# Patient Record
Sex: FEMALE | Race: WHITE | Employment: PART TIME | URBAN - METROPOLITAN AREA
[De-identification: names, ages, dates, MRNs, and addresses within clinical notes are randomized per-mention and may not be internally consistent; named-entity substitution may affect disease eponyms.]

---

## 2017-02-05 ENCOUNTER — HOSPITAL ENCOUNTER (INPATIENT)
Age: 67
LOS: 1 days | Discharge: HOME | DRG: 309 | End: 2017-02-05
Attending: EMERGENCY MEDICINE | Admitting: INTERNAL MEDICINE
Payer: COMMERCIAL

## 2017-02-05 VITALS
BODY MASS INDEX: 30.02 KG/M2 | SYSTOLIC BLOOD PRESSURE: 127 MMHG | HEART RATE: 80 BPM | DIASTOLIC BLOOD PRESSURE: 64 MMHG | OXYGEN SATURATION: 97 % | TEMPERATURE: 97.7 F | HEIGHT: 67 IN | WEIGHT: 191.3 LBS | RESPIRATION RATE: 18 BRPM

## 2017-02-05 DIAGNOSIS — R00.0 WIDE-COMPLEX TACHYCARDIA: Primary | ICD-10-CM

## 2017-02-05 DIAGNOSIS — E87.6 HYPOKALEMIA: ICD-10-CM

## 2017-02-05 DIAGNOSIS — I44.7 LEFT BUNDLE BRANCH BLOCK: ICD-10-CM

## 2017-02-05 DIAGNOSIS — D72.829 LEUKOCYTOSIS, UNSPECIFIED TYPE: ICD-10-CM

## 2017-02-05 PROBLEM — I24.89 DEMAND ISCHEMIA: Status: ACTIVE | Noted: 2017-02-05

## 2017-02-05 PROBLEM — I24.8 DEMAND ISCHEMIA (HCC): Status: ACTIVE | Noted: 2017-02-05

## 2017-02-05 PROBLEM — I10 HTN (HYPERTENSION): Status: ACTIVE | Noted: 2017-02-05

## 2017-02-05 LAB
-: NORMAL
ABSOLUTE EOS #: 0.77 K/UL (ref 0–0.4)
ABSOLUTE LYMPH #: 2.55 K/UL (ref 1–4.8)
ABSOLUTE MONO #: 2.3 K/UL (ref 0.1–0.8)
AMORPHOUS: NORMAL
ANION GAP SERPL CALCULATED.3IONS-SCNC: 16 MMOL/L (ref 9–17)
BACTERIA: NORMAL
BASOPHILS # BLD: 0 % (ref 0–2)
BASOPHILS ABSOLUTE: 0 K/UL (ref 0–0.2)
BILIRUBIN URINE: NEGATIVE
BUN BLDV-MCNC: 37 MG/DL (ref 8–23)
BUN/CREAT BLD: ABNORMAL (ref 9–20)
CALCIUM SERPL-MCNC: 7.7 MG/DL (ref 8.6–10.4)
CASTS UA: NORMAL /LPF (ref 0–8)
CHLORIDE BLD-SCNC: 97 MMOL/L (ref 98–107)
CHOLESTEROL/HDL RATIO: 4.3
CHOLESTEROL: 156 MG/DL
CO2: 20 MMOL/L (ref 20–31)
COLOR: YELLOW
CREAT SERPL-MCNC: 0.92 MG/DL (ref 0.5–0.9)
CRYSTALS, UA: NORMAL /HPF
DIFFERENTIAL TYPE: ABNORMAL
EOSINOPHILS RELATIVE PERCENT: 3 % (ref 1–4)
EPITHELIAL CELLS UA: NORMAL /HPF (ref 0–5)
GFR AFRICAN AMERICAN: >60 ML/MIN
GFR NON-AFRICAN AMERICAN: >60 ML/MIN
GFR SERPL CREATININE-BSD FRML MDRD: ABNORMAL ML/MIN/{1.73_M2}
GFR SERPL CREATININE-BSD FRML MDRD: ABNORMAL ML/MIN/{1.73_M2}
GLUCOSE BLD-MCNC: 137 MG/DL (ref 70–99)
GLUCOSE URINE: NEGATIVE
HCT VFR BLD CALC: 39.3 % (ref 36–46)
HDLC SERPL-MCNC: 36 MG/DL
HEMOGLOBIN: 13.1 G/DL (ref 12–16)
KETONES, URINE: NEGATIVE
LDL CHOLESTEROL: 56 MG/DL (ref 0–130)
LEUKOCYTE ESTERASE, URINE: NEGATIVE
LYMPHOCYTES # BLD: 10 % (ref 24–44)
MAGNESIUM: 2.3 MG/DL (ref 1.6–2.6)
MAGNESIUM: 2.3 MG/DL (ref 1.6–2.6)
MCH RBC QN AUTO: 30.5 PG (ref 26–34)
MCHC RBC AUTO-ENTMCNC: 33.4 G/DL (ref 31–37)
MCV RBC AUTO: 91.4 FL (ref 80–100)
METAMYELOCYTES ABSOLUTE COUNT: 0.77 K/UL
METAMYELOCYTES: 3 %
MONOCYTES # BLD: 9 % (ref 1–7)
MORPHOLOGY: NORMAL
MUCUS: NORMAL
NITRITE, URINE: NEGATIVE
OTHER OBSERVATIONS UA: NORMAL
PDW BLD-RTO: 14.1 % (ref 12.5–15.4)
PH UA: 6 (ref 5–8)
PLATELET # BLD: 356 K/UL (ref 140–450)
PLATELET ESTIMATE: ABNORMAL
PMV BLD AUTO: 7.5 FL (ref 6–12)
POC TROPONIN I: 0.11 NG/ML (ref 0–0.1)
POC TROPONIN INTERP: ABNORMAL
POTASSIUM SERPL-SCNC: 3.7 MMOL/L (ref 3.7–5.3)
PROTEIN UA: NEGATIVE
RBC # BLD: 4.3 M/UL (ref 4–5.2)
RBC # BLD: ABNORMAL 10*6/UL
RBC UA: NORMAL /HPF (ref 0–4)
RENAL EPITHELIAL, UA: NORMAL /HPF
SEG NEUTROPHILS: 75 % (ref 36–66)
SEGMENTED NEUTROPHILS ABSOLUTE COUNT: 19.11 K/UL (ref 1.8–7.7)
SODIUM BLD-SCNC: 133 MMOL/L (ref 135–144)
SPECIFIC GRAVITY UA: 1.02 (ref 1–1.03)
TRICHOMONAS: NORMAL
TRIGL SERPL-MCNC: 322 MG/DL
TROPONIN INTERP: NORMAL
TROPONIN T: <0.03 NG/ML
TURBIDITY: CLEAR
URINE HGB: NEGATIVE
UROBILINOGEN, URINE: NORMAL
VLDLC SERPL CALC-MCNC: ABNORMAL MG/DL (ref 1–30)
WBC # BLD: 25.5 K/UL (ref 3.5–11)
WBC # BLD: ABNORMAL 10*3/UL
WBC UA: NORMAL /HPF (ref 0–5)
YEAST: NORMAL

## 2017-02-05 PROCEDURE — 83735 ASSAY OF MAGNESIUM: CPT

## 2017-02-05 PROCEDURE — 6370000000 HC RX 637 (ALT 250 FOR IP): Performed by: INTERNAL MEDICINE

## 2017-02-05 PROCEDURE — 80048 BASIC METABOLIC PNL TOTAL CA: CPT

## 2017-02-05 PROCEDURE — 81001 URINALYSIS AUTO W/SCOPE: CPT

## 2017-02-05 PROCEDURE — 99285 EMERGENCY DEPT VISIT HI MDM: CPT

## 2017-02-05 PROCEDURE — 99223 1ST HOSP IP/OBS HIGH 75: CPT | Performed by: INTERNAL MEDICINE

## 2017-02-05 PROCEDURE — 2580000003 HC RX 258: Performed by: EMERGENCY MEDICINE

## 2017-02-05 PROCEDURE — 83036 HEMOGLOBIN GLYCOSYLATED A1C: CPT

## 2017-02-05 PROCEDURE — 36415 COLL VENOUS BLD VENIPUNCTURE: CPT

## 2017-02-05 PROCEDURE — 84484 ASSAY OF TROPONIN QUANT: CPT

## 2017-02-05 PROCEDURE — 97116 GAIT TRAINING THERAPY: CPT

## 2017-02-05 PROCEDURE — 85025 COMPLETE CBC W/AUTO DIFF WBC: CPT

## 2017-02-05 PROCEDURE — 2140000000 HC CCU INTERMEDIATE R&B

## 2017-02-05 PROCEDURE — 96374 THER/PROPH/DIAG INJ IV PUSH: CPT

## 2017-02-05 PROCEDURE — 6370000000 HC RX 637 (ALT 250 FOR IP): Performed by: HOSPITALIST

## 2017-02-05 PROCEDURE — 93005 ELECTROCARDIOGRAM TRACING: CPT

## 2017-02-05 PROCEDURE — 97161 PT EVAL LOW COMPLEX 20 MIN: CPT

## 2017-02-05 PROCEDURE — 80061 LIPID PANEL: CPT

## 2017-02-05 PROCEDURE — 6360000002 HC RX W HCPCS: Performed by: HOSPITALIST

## 2017-02-05 PROCEDURE — 6360000002 HC RX W HCPCS: Performed by: EMERGENCY MEDICINE

## 2017-02-05 RX ORDER — SODIUM CHLORIDE 0.9 % (FLUSH) 0.9 %
10 SYRINGE (ML) INJECTION PRN
Status: DISCONTINUED | OUTPATIENT
Start: 2017-02-05 | End: 2017-02-05 | Stop reason: HOSPADM

## 2017-02-05 RX ORDER — SODIUM CHLORIDE 0.9 % (FLUSH) 0.9 %
10 SYRINGE (ML) INJECTION EVERY 12 HOURS SCHEDULED
Status: DISCONTINUED | OUTPATIENT
Start: 2017-02-05 | End: 2017-02-05 | Stop reason: HOSPADM

## 2017-02-05 RX ORDER — METOPROLOL SUCCINATE 50 MG/1
50 TABLET, EXTENDED RELEASE ORAL DAILY
Status: DISCONTINUED | OUTPATIENT
Start: 2017-02-05 | End: 2017-02-05 | Stop reason: HOSPADM

## 2017-02-05 RX ORDER — ATORVASTATIN CALCIUM 20 MG/1
20 TABLET, FILM COATED ORAL NIGHTLY
Status: DISCONTINUED | OUTPATIENT
Start: 2017-02-05 | End: 2017-02-05 | Stop reason: HOSPADM

## 2017-02-05 RX ORDER — POTASSIUM CHLORIDE 20MEQ/15ML
40 LIQUID (ML) ORAL PRN
Status: DISCONTINUED | OUTPATIENT
Start: 2017-02-05 | End: 2017-02-05 | Stop reason: HOSPADM

## 2017-02-05 RX ORDER — POTASSIUM CHLORIDE 20 MEQ/1
40 TABLET, EXTENDED RELEASE ORAL PRN
Status: DISCONTINUED | OUTPATIENT
Start: 2017-02-05 | End: 2017-02-05 | Stop reason: HOSPADM

## 2017-02-05 RX ORDER — LISINOPRIL 2.5 MG/1
2.5 TABLET ORAL DAILY
COMMUNITY
End: 2017-02-08 | Stop reason: ALTCHOICE

## 2017-02-05 RX ORDER — ACETAMINOPHEN 325 MG/1
650 TABLET ORAL EVERY 4 HOURS PRN
Status: DISCONTINUED | OUTPATIENT
Start: 2017-02-05 | End: 2017-02-05 | Stop reason: HOSPADM

## 2017-02-05 RX ORDER — POTASSIUM CHLORIDE 7.45 MG/ML
10 INJECTION INTRAVENOUS PRN
Status: DISCONTINUED | OUTPATIENT
Start: 2017-02-05 | End: 2017-02-05 | Stop reason: HOSPADM

## 2017-02-05 RX ORDER — POTASSIUM CHLORIDE 20 MEQ/1
20 TABLET, EXTENDED RELEASE ORAL DAILY
Qty: 3 TABLET | Refills: 0 | Status: ON HOLD | OUTPATIENT
Start: 2017-02-05 | End: 2017-02-21 | Stop reason: ALTCHOICE

## 2017-02-05 RX ORDER — METOPROLOL SUCCINATE 50 MG/1
50 TABLET, EXTENDED RELEASE ORAL DAILY
Qty: 30 TABLET | Refills: 3 | Status: SHIPPED | OUTPATIENT
Start: 2017-02-05 | End: 2017-02-08 | Stop reason: SDUPTHER

## 2017-02-05 RX ORDER — ACETAMINOPHEN 325 MG/1
650 TABLET ORAL EVERY 4 HOURS PRN
Status: DISCONTINUED | OUTPATIENT
Start: 2017-02-05 | End: 2017-02-05

## 2017-02-05 RX ORDER — POTASSIUM CHLORIDE 20 MEQ/1
40 TABLET, EXTENDED RELEASE ORAL ONCE
Status: COMPLETED | OUTPATIENT
Start: 2017-02-05 | End: 2017-02-05

## 2017-02-05 RX ORDER — FUROSEMIDE 40 MG/1
40 TABLET ORAL 2 TIMES DAILY
COMMUNITY
End: 2017-02-08 | Stop reason: ALTCHOICE

## 2017-02-05 RX ORDER — ONDANSETRON 2 MG/ML
4 INJECTION INTRAMUSCULAR; INTRAVENOUS EVERY 6 HOURS PRN
Status: DISCONTINUED | OUTPATIENT
Start: 2017-02-05 | End: 2017-02-05 | Stop reason: HOSPADM

## 2017-02-05 RX ADMIN — POTASSIUM CHLORIDE 40 MEQ: 20 TABLET, EXTENDED RELEASE ORAL at 15:49

## 2017-02-05 RX ADMIN — DEXTROSE 1 MG/MIN: 5 SOLUTION INTRAVENOUS at 05:51

## 2017-02-05 RX ADMIN — METOPROLOL SUCCINATE 50 MG: 50 TABLET, FILM COATED, EXTENDED RELEASE ORAL at 13:19

## 2017-02-05 RX ADMIN — MAGNESIUM SULFATE IN DEXTROSE 1 G: 10 INJECTION, SOLUTION INTRAVENOUS at 15:44

## 2017-02-05 ASSESSMENT — ENCOUNTER SYMPTOMS
SHORTNESS OF BREATH: 1
EYES NEGATIVE: 1
VOMITING: 0
HEARTBURN: 0
COUGH: 0
SPUTUM PRODUCTION: 0
NAUSEA: 0
HEMOPTYSIS: 0
DIARRHEA: 0
WHEEZING: 0
CONSTIPATION: 0
ABDOMINAL PAIN: 0
ORTHOPNEA: 0
BACK PAIN: 0

## 2017-02-05 ASSESSMENT — HEART SCORE: ECG: 2

## 2017-02-05 ASSESSMENT — PAIN SCALES - GENERAL
PAINLEVEL_OUTOF10: 0
PAINLEVEL_OUTOF10: 0

## 2017-02-06 LAB
EKG ATRIAL RATE: 69 BPM
EKG P-R INTERVAL: 112 MS
EKG Q-T INTERVAL: 478 MS
EKG QRS DURATION: 134 MS
EKG QTC CALCULATION (BAZETT): 512 MS
EKG R AXIS: -10 DEGREES
EKG T AXIS: 137 DEGREES
EKG VENTRICULAR RATE: 69 BPM
ESTIMATED AVERAGE GLUCOSE: 140 MG/DL
HBA1C MFR BLD: 6.5 % (ref 4–6)

## 2017-02-07 ENCOUNTER — TELEPHONE (OUTPATIENT)
Dept: CARDIOLOGY | Facility: CLINIC | Age: 67
End: 2017-02-07

## 2017-02-08 ENCOUNTER — OFFICE VISIT (OUTPATIENT)
Dept: CARDIOLOGY | Facility: CLINIC | Age: 67
End: 2017-02-08

## 2017-02-08 VITALS
DIASTOLIC BLOOD PRESSURE: 72 MMHG | SYSTOLIC BLOOD PRESSURE: 104 MMHG | HEART RATE: 116 BPM | HEIGHT: 67 IN | BODY MASS INDEX: 29.32 KG/M2 | WEIGHT: 186.8 LBS

## 2017-02-08 DIAGNOSIS — I44.7 LBBB (LEFT BUNDLE BRANCH BLOCK): ICD-10-CM

## 2017-02-08 DIAGNOSIS — D72.829 LEUKOCYTOSIS, UNSPECIFIED TYPE: ICD-10-CM

## 2017-02-08 DIAGNOSIS — I25.10 CORONARY ARTERY CALCIFICATION: ICD-10-CM

## 2017-02-08 DIAGNOSIS — I47.1 PSVT (PAROXYSMAL SUPRAVENTRICULAR TACHYCARDIA) (HCC): ICD-10-CM

## 2017-02-08 DIAGNOSIS — R00.0 WIDE-COMPLEX TACHYCARDIA: Primary | ICD-10-CM

## 2017-02-08 DIAGNOSIS — I10 ESSENTIAL HYPERTENSION: ICD-10-CM

## 2017-02-08 DIAGNOSIS — E78.5 DYSLIPIDEMIA: ICD-10-CM

## 2017-02-08 DIAGNOSIS — I25.84 CORONARY ARTERY CALCIFICATION: ICD-10-CM

## 2017-02-08 PROCEDURE — 93000 ELECTROCARDIOGRAM COMPLETE: CPT | Performed by: INTERNAL MEDICINE

## 2017-02-08 PROCEDURE — 99214 OFFICE O/P EST MOD 30 MIN: CPT | Performed by: INTERNAL MEDICINE

## 2017-02-08 RX ORDER — METOPROLOL SUCCINATE 50 MG/1
25 TABLET, EXTENDED RELEASE ORAL DAILY
Qty: 30 TABLET | Refills: 3 | Status: ON HOLD | OUTPATIENT
Start: 2017-02-08 | End: 2017-02-21 | Stop reason: ALTCHOICE

## 2017-02-08 RX ORDER — DILTIAZEM HYDROCHLORIDE 240 MG/1
240 CAPSULE, EXTENDED RELEASE ORAL DAILY
Status: ON HOLD | COMMUNITY
Start: 2017-01-18 | End: 2017-02-21 | Stop reason: ALTCHOICE

## 2017-02-08 RX ORDER — DEXAMETHASONE 4 MG/1
TABLET ORAL
Status: ON HOLD | COMMUNITY
Start: 2017-01-27 | End: 2017-02-21 | Stop reason: ALTCHOICE

## 2017-02-21 ENCOUNTER — HOSPITAL ENCOUNTER (INPATIENT)
Age: 67
LOS: 5 days | Discharge: HOME | DRG: 274 | End: 2017-02-26
Attending: INTERNAL MEDICINE | Admitting: INTERNAL MEDICINE
Payer: COMMERCIAL

## 2017-02-21 ENCOUNTER — APPOINTMENT (OUTPATIENT)
Dept: GENERAL RADIOLOGY | Age: 67
DRG: 274 | End: 2017-02-21
Attending: INTERNAL MEDICINE
Payer: COMMERCIAL

## 2017-02-21 PROCEDURE — 2580000003 HC RX 258: Performed by: INTERNAL MEDICINE

## 2017-02-21 PROCEDURE — 1200000000 HC SEMI PRIVATE

## 2017-02-21 PROCEDURE — 6360000002 HC RX W HCPCS: Performed by: INTERNAL MEDICINE

## 2017-02-21 PROCEDURE — 73610 X-RAY EXAM OF ANKLE: CPT

## 2017-02-21 PROCEDURE — 6370000000 HC RX 637 (ALT 250 FOR IP): Performed by: INTERNAL MEDICINE

## 2017-02-21 PROCEDURE — 73610 X-RAY EXAM OF ANKLE: CPT | Performed by: RADIOLOGY

## 2017-02-21 RX ORDER — HYDROCODONE BITARTRATE AND ACETAMINOPHEN 5; 325 MG/1; MG/1
2 TABLET ORAL EVERY 6 HOURS PRN
Status: DISCONTINUED | OUTPATIENT
Start: 2017-02-21 | End: 2017-02-26 | Stop reason: HOSPADM

## 2017-02-21 RX ORDER — ONDANSETRON 2 MG/ML
4 INJECTION INTRAMUSCULAR; INTRAVENOUS EVERY 6 HOURS PRN
Status: DISCONTINUED | OUTPATIENT
Start: 2017-02-21 | End: 2017-02-26 | Stop reason: HOSPADM

## 2017-02-21 RX ORDER — SODIUM CHLORIDE 0.9 % (FLUSH) 0.9 %
10 SYRINGE (ML) INJECTION PRN
Status: DISCONTINUED | OUTPATIENT
Start: 2017-02-21 | End: 2017-02-26 | Stop reason: HOSPADM

## 2017-02-21 RX ORDER — SODIUM CHLORIDE 0.9 % (FLUSH) 0.9 %
10 SYRINGE (ML) INJECTION EVERY 12 HOURS SCHEDULED
Status: DISCONTINUED | OUTPATIENT
Start: 2017-02-21 | End: 2017-02-26 | Stop reason: HOSPADM

## 2017-02-21 RX ORDER — ATORVASTATIN CALCIUM 20 MG/1
20 TABLET, FILM COATED ORAL NIGHTLY
Status: DISCONTINUED | OUTPATIENT
Start: 2017-02-21 | End: 2017-02-26 | Stop reason: HOSPADM

## 2017-02-21 RX ORDER — MORPHINE SULFATE 2 MG/ML
2 INJECTION, SOLUTION INTRAMUSCULAR; INTRAVENOUS EVERY 4 HOURS PRN
Status: DISCONTINUED | OUTPATIENT
Start: 2017-02-21 | End: 2017-02-26 | Stop reason: HOSPADM

## 2017-02-21 RX ORDER — ACETAMINOPHEN 325 MG/1
650 TABLET ORAL EVERY 4 HOURS PRN
Status: DISCONTINUED | OUTPATIENT
Start: 2017-02-21 | End: 2017-02-26 | Stop reason: HOSPADM

## 2017-02-21 RX ADMIN — Medication 10 ML: at 21:46

## 2017-02-21 RX ADMIN — HYDROCODONE BITARTRATE AND ACETAMINOPHEN 2 TABLET: 5; 325 TABLET ORAL at 18:48

## 2017-02-21 RX ADMIN — ENOXAPARIN SODIUM 90 MG: 100 INJECTION SUBCUTANEOUS at 21:46

## 2017-02-21 RX ADMIN — ATORVASTATIN CALCIUM 20 MG: 20 TABLET, FILM COATED ORAL at 21:46

## 2017-02-21 ASSESSMENT — PAIN DESCRIPTION - PAIN TYPE: TYPE: ACUTE PAIN

## 2017-02-21 ASSESSMENT — PAIN DESCRIPTION - DESCRIPTORS: DESCRIPTORS: CRAMPING

## 2017-02-21 ASSESSMENT — PAIN DESCRIPTION - PROGRESSION: CLINICAL_PROGRESSION: GRADUALLY WORSENING

## 2017-02-21 ASSESSMENT — PAIN DESCRIPTION - FREQUENCY: FREQUENCY: CONTINUOUS

## 2017-02-21 ASSESSMENT — PAIN SCALES - GENERAL
PAINLEVEL_OUTOF10: 6
PAINLEVEL_OUTOF10: 6

## 2017-02-21 ASSESSMENT — PAIN DESCRIPTION - ORIENTATION: ORIENTATION: RIGHT;LEFT

## 2017-02-21 ASSESSMENT — PAIN DESCRIPTION - LOCATION: LOCATION: LEG;KNEE

## 2017-02-22 PROBLEM — S82.841D CLOSED BIMALLEOLAR FRACTURE OF RIGHT ANKLE WITH ROUTINE HEALING: Status: ACTIVE | Noted: 2017-02-22

## 2017-02-22 LAB
ABSOLUTE EOS #: 0.4 K/UL (ref 0–0.4)
ABSOLUTE LYMPH #: 1.1 K/UL (ref 1–4.8)
ABSOLUTE MONO #: 0.8 K/UL (ref 0.1–1.2)
ALBUMIN SERPL-MCNC: 3.2 G/DL (ref 3.5–5.2)
ALBUMIN/GLOBULIN RATIO: 1.1 (ref 1–2.5)
ALP BLD-CCNC: 96 U/L (ref 35–104)
ALT SERPL-CCNC: 18 U/L (ref 5–33)
ANION GAP SERPL CALCULATED.3IONS-SCNC: 14 MMOL/L (ref 9–17)
AST SERPL-CCNC: 13 U/L
BASOPHILS # BLD: 1 % (ref 0–2)
BASOPHILS ABSOLUTE: 0 K/UL (ref 0–0.2)
BILIRUB SERPL-MCNC: 0.28 MG/DL (ref 0.3–1.2)
BUN BLDV-MCNC: 13 MG/DL (ref 8–23)
BUN/CREAT BLD: ABNORMAL (ref 9–20)
CALCIUM SERPL-MCNC: 8.6 MG/DL (ref 8.6–10.4)
CHLORIDE BLD-SCNC: 101 MMOL/L (ref 98–107)
CO2: 25 MMOL/L (ref 20–31)
CREAT SERPL-MCNC: 0.8 MG/DL (ref 0.5–0.9)
DIFFERENTIAL TYPE: ABNORMAL
EOSINOPHILS RELATIVE PERCENT: 6 % (ref 1–4)
GFR AFRICAN AMERICAN: >60 ML/MIN
GFR NON-AFRICAN AMERICAN: >60 ML/MIN
GFR SERPL CREATININE-BSD FRML MDRD: ABNORMAL ML/MIN/{1.73_M2}
GFR SERPL CREATININE-BSD FRML MDRD: ABNORMAL ML/MIN/{1.73_M2}
GLUCOSE BLD-MCNC: 121 MG/DL (ref 70–99)
HCT VFR BLD CALC: 32.2 % (ref 36–46)
HEMOGLOBIN: 10.9 G/DL (ref 12–16)
LYMPHOCYTES # BLD: 16 % (ref 24–44)
MCH RBC QN AUTO: 30.9 PG (ref 26–34)
MCHC RBC AUTO-ENTMCNC: 33.9 G/DL (ref 31–37)
MCV RBC AUTO: 91 FL (ref 80–100)
MONOCYTES # BLD: 12 % (ref 2–11)
PDW BLD-RTO: 14.3 % (ref 12.5–15.4)
PLATELET # BLD: 360 K/UL (ref 140–450)
PLATELET ESTIMATE: ABNORMAL
PMV BLD AUTO: 7.7 FL (ref 6–12)
POTASSIUM SERPL-SCNC: 4.4 MMOL/L (ref 3.7–5.3)
RBC # BLD: 3.53 M/UL (ref 4–5.2)
RBC # BLD: ABNORMAL 10*6/UL
SEG NEUTROPHILS: 65 % (ref 36–66)
SEGMENTED NEUTROPHILS ABSOLUTE COUNT: 4.7 K/UL (ref 1.8–7.7)
SODIUM BLD-SCNC: 140 MMOL/L (ref 135–144)
TOTAL PROTEIN: 6.1 G/DL (ref 6.4–8.3)
WBC # BLD: 7.1 K/UL (ref 3.5–11)
WBC # BLD: ABNORMAL 10*3/UL

## 2017-02-22 PROCEDURE — 80053 COMPREHEN METABOLIC PANEL: CPT

## 2017-02-22 PROCEDURE — 6360000002 HC RX W HCPCS: Performed by: PODIATRIST

## 2017-02-22 PROCEDURE — 85025 COMPLETE CBC W/AUTO DIFF WBC: CPT

## 2017-02-22 PROCEDURE — 99222 1ST HOSP IP/OBS MODERATE 55: CPT | Performed by: FAMILY MEDICINE

## 2017-02-22 PROCEDURE — 36415 COLL VENOUS BLD VENIPUNCTURE: CPT

## 2017-02-22 PROCEDURE — 6370000000 HC RX 637 (ALT 250 FOR IP): Performed by: INTERNAL MEDICINE

## 2017-02-22 PROCEDURE — 6360000002 HC RX W HCPCS: Performed by: INTERNAL MEDICINE

## 2017-02-22 PROCEDURE — 1200000000 HC SEMI PRIVATE

## 2017-02-22 PROCEDURE — 2580000003 HC RX 258: Performed by: INTERNAL MEDICINE

## 2017-02-22 RX ORDER — FENTANYL CITRATE 50 UG/ML
25 INJECTION, SOLUTION INTRAMUSCULAR; INTRAVENOUS ONCE
Status: COMPLETED | OUTPATIENT
Start: 2017-02-22 | End: 2017-02-22

## 2017-02-22 RX ADMIN — ENOXAPARIN SODIUM 90 MG: 100 INJECTION SUBCUTANEOUS at 20:11

## 2017-02-22 RX ADMIN — HYDROCODONE BITARTRATE AND ACETAMINOPHEN 2 TABLET: 5; 325 TABLET ORAL at 13:17

## 2017-02-22 RX ADMIN — Medication 10 ML: at 10:52

## 2017-02-22 RX ADMIN — ATORVASTATIN CALCIUM 20 MG: 20 TABLET, FILM COATED ORAL at 20:11

## 2017-02-22 RX ADMIN — HYDROCODONE BITARTRATE AND ACETAMINOPHEN 2 TABLET: 5; 325 TABLET ORAL at 06:37

## 2017-02-22 RX ADMIN — Medication 10 ML: at 20:12

## 2017-02-22 RX ADMIN — FENTANYL CITRATE 25 MCG: 50 INJECTION, SOLUTION INTRAMUSCULAR; INTRAVENOUS at 10:51

## 2017-02-22 ASSESSMENT — ENCOUNTER SYMPTOMS
VOICE CHANGE: 0
NAUSEA: 0
BLOOD IN STOOL: 0
VOMITING: 0
SINUS PRESSURE: 0
DIARRHEA: 0
WHEEZING: 0
SORE THROAT: 0
CONSTIPATION: 0
ABDOMINAL PAIN: 0
COUGH: 0
SHORTNESS OF BREATH: 0

## 2017-02-22 ASSESSMENT — PAIN SCALES - GENERAL
PAINLEVEL_OUTOF10: 4
PAINLEVEL_OUTOF10: 0
PAINLEVEL_OUTOF10: 6
PAINLEVEL_OUTOF10: 5

## 2017-02-23 PROCEDURE — 2580000003 HC RX 258: Performed by: INTERNAL MEDICINE

## 2017-02-23 PROCEDURE — 6360000002 HC RX W HCPCS: Performed by: INTERNAL MEDICINE

## 2017-02-23 PROCEDURE — 1200000000 HC SEMI PRIVATE

## 2017-02-23 PROCEDURE — 99406 BEHAV CHNG SMOKING 3-10 MIN: CPT

## 2017-02-23 PROCEDURE — 99232 SBSQ HOSP IP/OBS MODERATE 35: CPT | Performed by: FAMILY MEDICINE

## 2017-02-23 PROCEDURE — 6370000000 HC RX 637 (ALT 250 FOR IP): Performed by: INTERNAL MEDICINE

## 2017-02-23 RX ADMIN — Medication 10 ML: at 09:18

## 2017-02-23 RX ADMIN — ENOXAPARIN SODIUM 90 MG: 100 INJECTION SUBCUTANEOUS at 21:41

## 2017-02-23 RX ADMIN — HYDROCODONE BITARTRATE AND ACETAMINOPHEN 2 TABLET: 5; 325 TABLET ORAL at 09:17

## 2017-02-23 RX ADMIN — ENOXAPARIN SODIUM 90 MG: 100 INJECTION SUBCUTANEOUS at 09:18

## 2017-02-23 RX ADMIN — HYDROCODONE BITARTRATE AND ACETAMINOPHEN 2 TABLET: 5; 325 TABLET ORAL at 21:41

## 2017-02-23 RX ADMIN — HYDROCODONE BITARTRATE AND ACETAMINOPHEN 2 TABLET: 5; 325 TABLET ORAL at 03:19

## 2017-02-23 RX ADMIN — HYDROCODONE BITARTRATE AND ACETAMINOPHEN 2 TABLET: 5; 325 TABLET ORAL at 15:38

## 2017-02-23 RX ADMIN — Medication 10 ML: at 21:42

## 2017-02-23 RX ADMIN — ATORVASTATIN CALCIUM 20 MG: 20 TABLET, FILM COATED ORAL at 21:41

## 2017-02-23 ASSESSMENT — PAIN SCALES - GENERAL
PAINLEVEL_OUTOF10: 4
PAINLEVEL_OUTOF10: 1
PAINLEVEL_OUTOF10: 0
PAINLEVEL_OUTOF10: 2
PAINLEVEL_OUTOF10: 4

## 2017-02-23 ASSESSMENT — ENCOUNTER SYMPTOMS
BLOOD IN STOOL: 0
SHORTNESS OF BREATH: 0
VOMITING: 0
CONSTIPATION: 0
COUGH: 0
SINUS PRESSURE: 0
DIARRHEA: 0
VOICE CHANGE: 0
WHEEZING: 0
ABDOMINAL PAIN: 0
SORE THROAT: 0
NAUSEA: 0

## 2017-02-24 ENCOUNTER — APPOINTMENT (OUTPATIENT)
Dept: CARDIAC CATH/INVASIVE PROCEDURES | Age: 67
DRG: 274 | End: 2017-02-24
Attending: INTERNAL MEDICINE
Payer: COMMERCIAL

## 2017-02-24 ENCOUNTER — ANESTHESIA EVENT (OUTPATIENT)
Dept: OPERATING ROOM | Age: 67
DRG: 274 | End: 2017-02-24
Payer: COMMERCIAL

## 2017-02-24 LAB
ANION GAP SERPL CALCULATED.3IONS-SCNC: 24 MMOL/L (ref 9–17)
BUN BLDV-MCNC: 15 MG/DL (ref 8–23)
BUN/CREAT BLD: ABNORMAL (ref 9–20)
CALCIUM SERPL-MCNC: 8.8 MG/DL (ref 8.6–10.4)
CHLORIDE BLD-SCNC: 97 MMOL/L (ref 98–107)
CO2: 24 MMOL/L (ref 20–31)
CREAT SERPL-MCNC: 0.82 MG/DL (ref 0.5–0.9)
GFR AFRICAN AMERICAN: >60 ML/MIN
GFR NON-AFRICAN AMERICAN: >60 ML/MIN
GFR SERPL CREATININE-BSD FRML MDRD: ABNORMAL ML/MIN/{1.73_M2}
GFR SERPL CREATININE-BSD FRML MDRD: ABNORMAL ML/MIN/{1.73_M2}
GLUCOSE BLD-MCNC: 111 MG/DL (ref 70–99)
HCT VFR BLD CALC: 31.8 % (ref 36–46)
HEMOGLOBIN: 10.5 G/DL (ref 12–16)
MCH RBC QN AUTO: 30.2 PG (ref 26–34)
MCHC RBC AUTO-ENTMCNC: 32.9 G/DL (ref 31–37)
MCV RBC AUTO: 91.9 FL (ref 80–100)
PDW BLD-RTO: 14.6 % (ref 12.5–15.4)
PLATELET # BLD: 357 K/UL (ref 140–450)
PMV BLD AUTO: 7.5 FL (ref 6–12)
POTASSIUM SERPL-SCNC: 5 MMOL/L (ref 3.7–5.3)
RBC # BLD: 3.46 M/UL (ref 4–5.2)
SODIUM BLD-SCNC: 145 MMOL/L (ref 135–144)
VITAMIN D 25-HYDROXY: 15.7 NG/ML (ref 30–100)
WBC # BLD: 5.7 K/UL (ref 3.5–11)

## 2017-02-24 PROCEDURE — 93613 INTRACARDIAC EPHYS 3D MAPG: CPT | Performed by: INTERNAL MEDICINE

## 2017-02-24 PROCEDURE — 2580000003 HC RX 258: Performed by: FAMILY MEDICINE

## 2017-02-24 PROCEDURE — 93662 INTRACARDIAC ECG (ICE): CPT | Performed by: INTERNAL MEDICINE

## 2017-02-24 PROCEDURE — C1730 CATH, EP, 19 OR FEW ELECT: HCPCS

## 2017-02-24 PROCEDURE — 93880 EXTRACRANIAL BILAT STUDY: CPT

## 2017-02-24 PROCEDURE — 1200000000 HC SEMI PRIVATE

## 2017-02-24 PROCEDURE — C1733 CATH, EP, OTHR THAN COOL-TIP: HCPCS

## 2017-02-24 PROCEDURE — 80048 BASIC METABOLIC PNL TOTAL CA: CPT

## 2017-02-24 PROCEDURE — 82306 VITAMIN D 25 HYDROXY: CPT

## 2017-02-24 PROCEDURE — 02K83ZZ MAP CONDUCTION MECHANISM, PERCUTANEOUS APPROACH: ICD-10-PCS | Performed by: INTERNAL MEDICINE

## 2017-02-24 PROCEDURE — 4A0234Z MEASUREMENT OF CARDIAC ELECTRICAL ACTIVITY, PERCUTANEOUS APPROACH: ICD-10-PCS | Performed by: INTERNAL MEDICINE

## 2017-02-24 PROCEDURE — 6370000000 HC RX 637 (ALT 250 FOR IP): Performed by: INTERNAL MEDICINE

## 2017-02-24 PROCEDURE — 93462 L HRT CATH TRNSPTL PUNCTURE: CPT | Performed by: INTERNAL MEDICINE

## 2017-02-24 PROCEDURE — C1768 GRAFT, VASCULAR: HCPCS

## 2017-02-24 PROCEDURE — 4A023FZ MEASUREMENT OF CARDIAC RHYTHM, PERCUTANEOUS APPROACH: ICD-10-PCS | Performed by: INTERNAL MEDICINE

## 2017-02-24 PROCEDURE — 2500000003 HC RX 250 WO HCPCS

## 2017-02-24 PROCEDURE — 02583ZZ DESTRUCTION OF CONDUCTION MECHANISM, PERCUTANEOUS APPROACH: ICD-10-PCS | Performed by: INTERNAL MEDICINE

## 2017-02-24 PROCEDURE — 99232 SBSQ HOSP IP/OBS MODERATE 35: CPT | Performed by: FAMILY MEDICINE

## 2017-02-24 PROCEDURE — 6360000002 HC RX W HCPCS

## 2017-02-24 PROCEDURE — 36415 COLL VENOUS BLD VENIPUNCTURE: CPT

## 2017-02-24 PROCEDURE — 93653 COMPRE EP EVAL TX SVT: CPT | Performed by: INTERNAL MEDICINE

## 2017-02-24 PROCEDURE — 85027 COMPLETE CBC AUTOMATED: CPT

## 2017-02-24 PROCEDURE — C1894 INTRO/SHEATH, NON-LASER: HCPCS

## 2017-02-24 PROCEDURE — C1759 CATH, INTRA ECHOCARDIOGRAPHY: HCPCS

## 2017-02-24 PROCEDURE — 2580000003 HC RX 258: Performed by: INTERNAL MEDICINE

## 2017-02-24 RX ORDER — DILTIAZEM HYDROCHLORIDE 120 MG/1
120 CAPSULE, COATED, EXTENDED RELEASE ORAL DAILY
Status: DISCONTINUED | OUTPATIENT
Start: 2017-02-24 | End: 2017-02-26 | Stop reason: HOSPADM

## 2017-02-24 RX ORDER — FLECAINIDE ACETATE 50 MG/1
50 TABLET ORAL EVERY 12 HOURS SCHEDULED
Status: DISCONTINUED | OUTPATIENT
Start: 2017-02-24 | End: 2017-02-26 | Stop reason: HOSPADM

## 2017-02-24 RX ORDER — SODIUM CHLORIDE 0.9 % (FLUSH) 0.9 %
10 SYRINGE (ML) INJECTION EVERY 12 HOURS SCHEDULED
Status: DISCONTINUED | OUTPATIENT
Start: 2017-02-24 | End: 2017-02-26 | Stop reason: HOSPADM

## 2017-02-24 RX ORDER — SODIUM CHLORIDE 0.9 % (FLUSH) 0.9 %
10 SYRINGE (ML) INJECTION PRN
Status: DISCONTINUED | OUTPATIENT
Start: 2017-02-24 | End: 2017-02-26 | Stop reason: HOSPADM

## 2017-02-24 RX ORDER — ERGOCALCIFEROL 1.25 MG/1
50000 CAPSULE ORAL WEEKLY
Status: DISCONTINUED | OUTPATIENT
Start: 2017-02-25 | End: 2017-02-26 | Stop reason: HOSPADM

## 2017-02-24 RX ORDER — ACETAMINOPHEN 325 MG/1
650 TABLET ORAL EVERY 4 HOURS PRN
Status: DISCONTINUED | OUTPATIENT
Start: 2017-02-24 | End: 2017-02-26 | Stop reason: HOSPADM

## 2017-02-24 RX ADMIN — DILTIAZEM HYDROCHLORIDE 120 MG: 120 CAPSULE, COATED, EXTENDED RELEASE ORAL at 18:28

## 2017-02-24 RX ADMIN — HYDROCODONE BITARTRATE AND ACETAMINOPHEN 2 TABLET: 5; 325 TABLET ORAL at 08:44

## 2017-02-24 RX ADMIN — SODIUM CHLORIDE: 9 INJECTION, SOLUTION INTRAVENOUS at 11:34

## 2017-02-24 RX ADMIN — FLECAINIDE ACETATE 50 MG: 50 TABLET ORAL at 20:00

## 2017-02-24 RX ADMIN — Medication 10 ML: at 08:45

## 2017-02-24 RX ADMIN — HYDROCODONE BITARTRATE AND ACETAMINOPHEN 2 TABLET: 5; 325 TABLET ORAL at 18:14

## 2017-02-24 RX ADMIN — ATORVASTATIN CALCIUM 20 MG: 20 TABLET, FILM COATED ORAL at 20:00

## 2017-02-24 ASSESSMENT — ENCOUNTER SYMPTOMS
CONSTIPATION: 0
SHORTNESS OF BREATH: 0
DIARRHEA: 0
BLOOD IN STOOL: 0
SORE THROAT: 0
SINUS PRESSURE: 0
VOMITING: 0
WHEEZING: 0
ABDOMINAL PAIN: 0
VOICE CHANGE: 0
COUGH: 0
NAUSEA: 0

## 2017-02-24 ASSESSMENT — PAIN SCALES - GENERAL
PAINLEVEL_OUTOF10: 1
PAINLEVEL_OUTOF10: 2
PAINLEVEL_OUTOF10: 0
PAINLEVEL_OUTOF10: 2
PAINLEVEL_OUTOF10: 5
PAINLEVEL_OUTOF10: 0

## 2017-02-24 ASSESSMENT — PAIN DESCRIPTION - FREQUENCY: FREQUENCY: CONTINUOUS

## 2017-02-24 ASSESSMENT — PAIN DESCRIPTION - PROGRESSION: CLINICAL_PROGRESSION: GRADUALLY WORSENING

## 2017-02-24 ASSESSMENT — PAIN DESCRIPTION - LOCATION: LOCATION: ANKLE

## 2017-02-24 ASSESSMENT — PAIN DESCRIPTION - DESCRIPTORS: DESCRIPTORS: SHARP

## 2017-02-24 ASSESSMENT — PAIN DESCRIPTION - ORIENTATION: ORIENTATION: RIGHT

## 2017-02-24 ASSESSMENT — PAIN DESCRIPTION - PAIN TYPE: TYPE: ACUTE PAIN

## 2017-02-24 ASSESSMENT — PAIN DESCRIPTION - ONSET: ONSET: ON-GOING

## 2017-02-25 ENCOUNTER — APPOINTMENT (OUTPATIENT)
Dept: GENERAL RADIOLOGY | Age: 67
DRG: 274 | End: 2017-02-25
Attending: INTERNAL MEDICINE
Payer: COMMERCIAL

## 2017-02-25 ENCOUNTER — ANESTHESIA (OUTPATIENT)
Dept: OPERATING ROOM | Age: 67
DRG: 274 | End: 2017-02-25
Payer: COMMERCIAL

## 2017-02-25 VITALS — SYSTOLIC BLOOD PRESSURE: 86 MMHG | DIASTOLIC BLOOD PRESSURE: 46 MMHG | OXYGEN SATURATION: 98 %

## 2017-02-25 LAB
ANION GAP SERPL CALCULATED.3IONS-SCNC: 15 MMOL/L (ref 9–17)
BUN BLDV-MCNC: 13 MG/DL (ref 8–23)
BUN/CREAT BLD: ABNORMAL (ref 9–20)
CALCIUM SERPL-MCNC: 8.3 MG/DL (ref 8.6–10.4)
CHLORIDE BLD-SCNC: 102 MMOL/L (ref 98–107)
CO2: 22 MMOL/L (ref 20–31)
CREAT SERPL-MCNC: 0.7 MG/DL (ref 0.5–0.9)
GFR AFRICAN AMERICAN: >60 ML/MIN
GFR NON-AFRICAN AMERICAN: >60 ML/MIN
GFR SERPL CREATININE-BSD FRML MDRD: ABNORMAL ML/MIN/{1.73_M2}
GFR SERPL CREATININE-BSD FRML MDRD: ABNORMAL ML/MIN/{1.73_M2}
GLUCOSE BLD-MCNC: 176 MG/DL (ref 70–99)
HCT VFR BLD CALC: 33.4 % (ref 36–46)
HEMOGLOBIN: 11.2 G/DL (ref 12–16)
MCH RBC QN AUTO: 30.5 PG (ref 26–34)
MCHC RBC AUTO-ENTMCNC: 33.4 G/DL (ref 31–37)
MCV RBC AUTO: 91.1 FL (ref 80–100)
PDW BLD-RTO: 14.1 % (ref 12.5–15.4)
PLATELET # BLD: 379 K/UL (ref 140–450)
PMV BLD AUTO: 7.3 FL (ref 6–12)
POTASSIUM SERPL-SCNC: 4.3 MMOL/L (ref 3.7–5.3)
RBC # BLD: 3.67 M/UL (ref 4–5.2)
SODIUM BLD-SCNC: 139 MMOL/L (ref 135–144)
WBC # BLD: 10.6 K/UL (ref 3.5–11)

## 2017-02-25 PROCEDURE — 6370000000 HC RX 637 (ALT 250 FOR IP): Performed by: PODIATRIST

## 2017-02-25 PROCEDURE — 7100000000 HC PACU RECOVERY - FIRST 15 MIN: Performed by: PODIATRIST

## 2017-02-25 PROCEDURE — 1200000000 HC SEMI PRIVATE

## 2017-02-25 PROCEDURE — 7100000001 HC PACU RECOVERY - ADDTL 15 MIN: Performed by: PODIATRIST

## 2017-02-25 PROCEDURE — 3700000001 HC ADD 15 MINUTES (ANESTHESIA): Performed by: PODIATRIST

## 2017-02-25 PROCEDURE — C1713 ANCHOR/SCREW BN/BN,TIS/BN: HCPCS | Performed by: PODIATRIST

## 2017-02-25 PROCEDURE — 2580000003 HC RX 258: Performed by: INTERNAL MEDICINE

## 2017-02-25 PROCEDURE — 3600000004 HC SURGERY LEVEL 4 BASE: Performed by: PODIATRIST

## 2017-02-25 PROCEDURE — 0QSJ04Z REPOSITION RIGHT FIBULA WITH INTERNAL FIXATION DEVICE, OPEN APPROACH: ICD-10-PCS | Performed by: PODIATRIST

## 2017-02-25 PROCEDURE — 80048 BASIC METABOLIC PNL TOTAL CA: CPT

## 2017-02-25 PROCEDURE — 99232 SBSQ HOSP IP/OBS MODERATE 35: CPT | Performed by: FAMILY MEDICINE

## 2017-02-25 PROCEDURE — 6360000002 HC RX W HCPCS: Performed by: INTERNAL MEDICINE

## 2017-02-25 PROCEDURE — 85027 COMPLETE CBC AUTOMATED: CPT

## 2017-02-25 PROCEDURE — 2720000010 HC SURG SUPPLY STERILE: Performed by: PODIATRIST

## 2017-02-25 PROCEDURE — 6360000002 HC RX W HCPCS: Performed by: NURSE ANESTHETIST, CERTIFIED REGISTERED

## 2017-02-25 PROCEDURE — 73610 X-RAY EXAM OF ANKLE: CPT

## 2017-02-25 PROCEDURE — 6370000000 HC RX 637 (ALT 250 FOR IP): Performed by: FAMILY MEDICINE

## 2017-02-25 PROCEDURE — 6360000002 HC RX W HCPCS: Performed by: ANESTHESIOLOGY

## 2017-02-25 PROCEDURE — 6360000002 HC RX W HCPCS: Performed by: PODIATRIST

## 2017-02-25 PROCEDURE — 2580000003 HC RX 258: Performed by: NURSE ANESTHETIST, CERTIFIED REGISTERED

## 2017-02-25 PROCEDURE — 3600000014 HC SURGERY LEVEL 4 ADDTL 15MIN: Performed by: PODIATRIST

## 2017-02-25 PROCEDURE — 3700000000 HC ANESTHESIA ATTENDED CARE: Performed by: PODIATRIST

## 2017-02-25 PROCEDURE — 6370000000 HC RX 637 (ALT 250 FOR IP): Performed by: INTERNAL MEDICINE

## 2017-02-25 PROCEDURE — 2580000003 HC RX 258: Performed by: PODIATRIST

## 2017-02-25 PROCEDURE — 2500000003 HC RX 250 WO HCPCS: Performed by: NURSE ANESTHETIST, CERTIFIED REGISTERED

## 2017-02-25 PROCEDURE — 36415 COLL VENOUS BLD VENIPUNCTURE: CPT

## 2017-02-25 PROCEDURE — 0QSG04Z REPOSITION RIGHT TIBIA WITH INTERNAL FIXATION DEVICE, OPEN APPROACH: ICD-10-PCS | Performed by: PODIATRIST

## 2017-02-25 DEVICE — 3.5 X 14 MM R3CON NON-LOCKING PLATE SCREW
Type: IMPLANTABLE DEVICE | Status: FUNCTIONAL
Brand: GORILLA PLATING SYSTEM

## 2017-02-25 DEVICE — MINI MONSTER 4.0 X 50MM HEADED SCREW, LONG THREAD
Type: IMPLANTABLE DEVICE | Status: FUNCTIONAL
Brand: MONSTER SCREW SYSTEM

## 2017-02-25 DEVICE — GORILLA, ANKLE FX, ANATOMICAL FIBULAR PLATE R, 09-HOLE
Type: IMPLANTABLE DEVICE | Status: FUNCTIONAL
Brand: GORILLA PLATING SYSTEM

## 2017-02-25 DEVICE — 3.5 X 14 MM R3CON LOCKING PLATE SCREW
Type: IMPLANTABLE DEVICE | Status: FUNCTIONAL
Brand: GORILLA PLATING SYSTEM

## 2017-02-25 DEVICE — K-WIRE, SINGLE ENDED TROCAR TIP, SMOOTH, 1.2 X 150MM
Type: IMPLANTABLE DEVICE | Status: FUNCTIONAL
Brand: MONSTER SCREW SYSTEM

## 2017-02-25 DEVICE — 3.5 X 16 MM R3CON NON-LOCKING PLATE SCREW
Type: IMPLANTABLE DEVICE | Status: FUNCTIONAL
Brand: GORILLA PLATING SYSTEM

## 2017-02-25 DEVICE — 3.5 X 16 MM R3CON LOCKING PLATE SCREW
Type: IMPLANTABLE DEVICE | Status: FUNCTIONAL
Brand: GORILLA PLATING SYSTEM

## 2017-02-25 RX ORDER — ONDANSETRON 2 MG/ML
4 INJECTION INTRAMUSCULAR; INTRAVENOUS
Status: DISCONTINUED | OUTPATIENT
Start: 2017-02-25 | End: 2017-02-25

## 2017-02-25 RX ORDER — DEXAMETHASONE SODIUM PHOSPHATE 10 MG/ML
INJECTION, SOLUTION INTRAMUSCULAR; INTRAVENOUS PRN
Status: DISCONTINUED | OUTPATIENT
Start: 2017-02-25 | End: 2017-02-25 | Stop reason: SDUPTHER

## 2017-02-25 RX ORDER — OXYCODONE HYDROCHLORIDE AND ACETAMINOPHEN 5; 325 MG/1; MG/1
1 TABLET ORAL EVERY 4 HOURS PRN
Status: DISCONTINUED | OUTPATIENT
Start: 2017-02-25 | End: 2017-02-25

## 2017-02-25 RX ORDER — GLYCOPYRROLATE 0.2 MG/ML
INJECTION INTRAMUSCULAR; INTRAVENOUS PRN
Status: DISCONTINUED | OUTPATIENT
Start: 2017-02-25 | End: 2017-02-25 | Stop reason: SDUPTHER

## 2017-02-25 RX ORDER — CEPHALEXIN 500 MG/1
500 CAPSULE ORAL 2 TIMES DAILY
Status: DISCONTINUED | OUTPATIENT
Start: 2017-02-25 | End: 2017-02-26 | Stop reason: HOSPADM

## 2017-02-25 RX ORDER — SODIUM CHLORIDE, SODIUM LACTATE, POTASSIUM CHLORIDE, CALCIUM CHLORIDE 600; 310; 30; 20 MG/100ML; MG/100ML; MG/100ML; MG/100ML
INJECTION, SOLUTION INTRAVENOUS CONTINUOUS PRN
Status: DISCONTINUED | OUTPATIENT
Start: 2017-02-25 | End: 2017-02-25 | Stop reason: SDUPTHER

## 2017-02-25 RX ORDER — SODIUM CHLORIDE 9 MG/ML
INJECTION, SOLUTION INTRAVENOUS CONTINUOUS PRN
Status: DISCONTINUED | OUTPATIENT
Start: 2017-02-25 | End: 2017-02-25 | Stop reason: SDUPTHER

## 2017-02-25 RX ORDER — HYDRALAZINE HYDROCHLORIDE 20 MG/ML
5 INJECTION INTRAMUSCULAR; INTRAVENOUS EVERY 10 MIN PRN
Status: DISCONTINUED | OUTPATIENT
Start: 2017-02-25 | End: 2017-02-25

## 2017-02-25 RX ORDER — FENTANYL CITRATE 50 UG/ML
25 INJECTION, SOLUTION INTRAMUSCULAR; INTRAVENOUS EVERY 5 MIN PRN
Status: DISCONTINUED | OUTPATIENT
Start: 2017-02-25 | End: 2017-02-25

## 2017-02-25 RX ORDER — FENTANYL CITRATE 50 UG/ML
50 INJECTION, SOLUTION INTRAMUSCULAR; INTRAVENOUS EVERY 5 MIN PRN
Status: DISCONTINUED | OUTPATIENT
Start: 2017-02-25 | End: 2017-02-25

## 2017-02-25 RX ORDER — HYDROCODONE BITARTRATE AND ACETAMINOPHEN 5; 325 MG/1; MG/1
TABLET ORAL
Status: DISPENSED
Start: 2017-02-25 | End: 2017-02-25

## 2017-02-25 RX ORDER — PROPOFOL 10 MG/ML
INJECTION, EMULSION INTRAVENOUS PRN
Status: DISCONTINUED | OUTPATIENT
Start: 2017-02-25 | End: 2017-02-25 | Stop reason: SDUPTHER

## 2017-02-25 RX ORDER — DIPHENHYDRAMINE HYDROCHLORIDE 50 MG/ML
12.5 INJECTION INTRAMUSCULAR; INTRAVENOUS
Status: DISCONTINUED | OUTPATIENT
Start: 2017-02-25 | End: 2017-02-25

## 2017-02-25 RX ORDER — LIDOCAINE HYDROCHLORIDE 10 MG/ML
INJECTION, SOLUTION EPIDURAL; INFILTRATION; INTRACAUDAL; PERINEURAL PRN
Status: DISCONTINUED | OUTPATIENT
Start: 2017-02-25 | End: 2017-02-25 | Stop reason: SDUPTHER

## 2017-02-25 RX ORDER — ROCURONIUM BROMIDE 10 MG/ML
INJECTION, SOLUTION INTRAVENOUS PRN
Status: DISCONTINUED | OUTPATIENT
Start: 2017-02-25 | End: 2017-02-25 | Stop reason: SDUPTHER

## 2017-02-25 RX ORDER — FENTANYL CITRATE 50 UG/ML
INJECTION, SOLUTION INTRAMUSCULAR; INTRAVENOUS PRN
Status: DISCONTINUED | OUTPATIENT
Start: 2017-02-25 | End: 2017-02-25 | Stop reason: SDUPTHER

## 2017-02-25 RX ORDER — MAGNESIUM HYDROXIDE 1200 MG/15ML
LIQUID ORAL CONTINUOUS PRN
Status: DISCONTINUED | OUTPATIENT
Start: 2017-02-25 | End: 2017-02-25

## 2017-02-25 RX ORDER — LABETALOL HYDROCHLORIDE 5 MG/ML
5 INJECTION, SOLUTION INTRAVENOUS EVERY 10 MIN PRN
Status: DISCONTINUED | OUTPATIENT
Start: 2017-02-25 | End: 2017-02-25

## 2017-02-25 RX ORDER — NEOSTIGMINE METHYLSULFATE 1 MG/ML
INJECTION, SOLUTION INTRAVENOUS PRN
Status: DISCONTINUED | OUTPATIENT
Start: 2017-02-25 | End: 2017-02-25 | Stop reason: SDUPTHER

## 2017-02-25 RX ADMIN — DILTIAZEM HYDROCHLORIDE 120 MG: 120 CAPSULE, COATED, EXTENDED RELEASE ORAL at 12:35

## 2017-02-25 RX ADMIN — FENTANYL CITRATE 50 MCG: 50 INJECTION, SOLUTION INTRAMUSCULAR; INTRAVENOUS at 07:44

## 2017-02-25 RX ADMIN — DEXAMETHASONE SODIUM PHOSPHATE 10 MG: 10 INJECTION, SOLUTION INTRAMUSCULAR; INTRAVENOUS at 07:32

## 2017-02-25 RX ADMIN — MORPHINE SULFATE 2 MG: 2 INJECTION, SOLUTION INTRAMUSCULAR; INTRAVENOUS at 22:09

## 2017-02-25 RX ADMIN — PROPOFOL 130 MG: 10 INJECTION, EMULSION INTRAVENOUS at 07:28

## 2017-02-25 RX ADMIN — ONDANSETRON 4 MG: 2 INJECTION, SOLUTION INTRAMUSCULAR; INTRAVENOUS at 08:59

## 2017-02-25 RX ADMIN — FLECAINIDE ACETATE 50 MG: 50 TABLET ORAL at 12:35

## 2017-02-25 RX ADMIN — FENTANYL CITRATE 50 MCG: 50 INJECTION, SOLUTION INTRAMUSCULAR; INTRAVENOUS at 09:40

## 2017-02-25 RX ADMIN — Medication 2 G: at 07:36

## 2017-02-25 RX ADMIN — FENTANYL CITRATE 100 MCG: 50 INJECTION, SOLUTION INTRAMUSCULAR; INTRAVENOUS at 07:28

## 2017-02-25 RX ADMIN — HYDROCODONE BITARTRATE AND ACETAMINOPHEN 2 TABLET: 5; 325 TABLET ORAL at 02:40

## 2017-02-25 RX ADMIN — HYDROCODONE BITARTRATE AND ACETAMINOPHEN 2 TABLET: 5; 325 TABLET ORAL at 12:34

## 2017-02-25 RX ADMIN — FENTANYL CITRATE 50 MCG: 50 INJECTION, SOLUTION INTRAMUSCULAR; INTRAVENOUS at 10:20

## 2017-02-25 RX ADMIN — CEPHALEXIN 500 MG: 500 CAPSULE ORAL at 20:24

## 2017-02-25 RX ADMIN — FENTANYL CITRATE 50 MCG: 50 INJECTION, SOLUTION INTRAMUSCULAR; INTRAVENOUS at 10:15

## 2017-02-25 RX ADMIN — LIDOCAINE HYDROCHLORIDE 50 MG: 10 INJECTION, SOLUTION EPIDURAL; INFILTRATION; INTRACAUDAL; PERINEURAL at 07:28

## 2017-02-25 RX ADMIN — SODIUM CHLORIDE, POTASSIUM CHLORIDE, SODIUM LACTATE AND CALCIUM CHLORIDE: 600; 310; 30; 20 INJECTION, SOLUTION INTRAVENOUS at 09:34

## 2017-02-25 RX ADMIN — GLYCOPYRROLATE 0.5 MG: 0.2 INJECTION INTRAMUSCULAR; INTRAVENOUS at 09:20

## 2017-02-25 RX ADMIN — SODIUM CHLORIDE: 9 INJECTION, SOLUTION INTRAVENOUS at 07:22

## 2017-02-25 RX ADMIN — ERGOCALCIFEROL 50000 UNITS: 1.25 CAPSULE ORAL at 12:36

## 2017-02-25 RX ADMIN — ENOXAPARIN SODIUM 90 MG: 100 INJECTION SUBCUTANEOUS at 12:36

## 2017-02-25 RX ADMIN — ENOXAPARIN SODIUM 90 MG: 100 INJECTION SUBCUTANEOUS at 20:24

## 2017-02-25 RX ADMIN — FENTANYL CITRATE 50 MCG: 50 INJECTION, SOLUTION INTRAMUSCULAR; INTRAVENOUS at 09:41

## 2017-02-25 RX ADMIN — ROCURONIUM BROMIDE 50 MG: 10 INJECTION INTRAVENOUS at 07:29

## 2017-02-25 RX ADMIN — FLECAINIDE ACETATE 50 MG: 50 TABLET ORAL at 20:24

## 2017-02-25 RX ADMIN — ATORVASTATIN CALCIUM 20 MG: 20 TABLET, FILM COATED ORAL at 20:24

## 2017-02-25 RX ADMIN — HYDROCODONE BITARTRATE AND ACETAMINOPHEN 2 TABLET: 5; 325 TABLET ORAL at 18:05

## 2017-02-25 RX ADMIN — Medication 10 ML: at 20:26

## 2017-02-25 RX ADMIN — NEOSTIGMINE METHYLSULFATE 3 MG: 1 INJECTION, SOLUTION INTRAVENOUS at 09:20

## 2017-02-25 ASSESSMENT — PAIN SCALES - GENERAL
PAINLEVEL_OUTOF10: 7
PAINLEVEL_OUTOF10: 8
PAINLEVEL_OUTOF10: 5
PAINLEVEL_OUTOF10: 0
PAINLEVEL_OUTOF10: 0
PAINLEVEL_OUTOF10: 3
PAINLEVEL_OUTOF10: 0
PAINLEVEL_OUTOF10: 8
PAINLEVEL_OUTOF10: 7
PAINLEVEL_OUTOF10: 4
PAINLEVEL_OUTOF10: 5
PAINLEVEL_OUTOF10: 7
PAINLEVEL_OUTOF10: 3

## 2017-02-25 ASSESSMENT — ENCOUNTER SYMPTOMS
DIARRHEA: 0
BLOOD IN STOOL: 0
WHEEZING: 0
SHORTNESS OF BREATH: 0
SORE THROAT: 0
SINUS PRESSURE: 0
NAUSEA: 0
VOICE CHANGE: 0
VOMITING: 0
COUGH: 0
CONSTIPATION: 0
ABDOMINAL PAIN: 0

## 2017-02-25 ASSESSMENT — PAIN DESCRIPTION - ORIENTATION: ORIENTATION: RIGHT

## 2017-02-25 ASSESSMENT — PAIN DESCRIPTION - LOCATION: LOCATION: ANKLE

## 2017-02-25 ASSESSMENT — PAIN DESCRIPTION - PAIN TYPE: TYPE: ACUTE PAIN

## 2017-02-25 ASSESSMENT — PAIN - FUNCTIONAL ASSESSMENT: PAIN_FUNCTIONAL_ASSESSMENT: 0-10

## 2017-02-26 VITALS
DIASTOLIC BLOOD PRESSURE: 55 MMHG | TEMPERATURE: 98.3 F | SYSTOLIC BLOOD PRESSURE: 131 MMHG | HEART RATE: 102 BPM | HEIGHT: 67 IN | OXYGEN SATURATION: 93 % | BODY MASS INDEX: 31.28 KG/M2 | RESPIRATION RATE: 18 BRPM | WEIGHT: 199.3 LBS

## 2017-02-26 LAB
ANION GAP SERPL CALCULATED.3IONS-SCNC: 12 MMOL/L (ref 9–17)
BUN BLDV-MCNC: 15 MG/DL (ref 8–23)
BUN/CREAT BLD: ABNORMAL (ref 9–20)
CALCIUM SERPL-MCNC: 8.6 MG/DL (ref 8.6–10.4)
CHLORIDE BLD-SCNC: 103 MMOL/L (ref 98–107)
CO2: 24 MMOL/L (ref 20–31)
CREAT SERPL-MCNC: 0.68 MG/DL (ref 0.5–0.9)
GFR AFRICAN AMERICAN: >60 ML/MIN
GFR NON-AFRICAN AMERICAN: >60 ML/MIN
GFR SERPL CREATININE-BSD FRML MDRD: ABNORMAL ML/MIN/{1.73_M2}
GFR SERPL CREATININE-BSD FRML MDRD: ABNORMAL ML/MIN/{1.73_M2}
GLUCOSE BLD-MCNC: 143 MG/DL (ref 70–99)
HCT VFR BLD CALC: 31.1 % (ref 36–46)
HEMOGLOBIN: 10.4 G/DL (ref 12–16)
MCH RBC QN AUTO: 30.3 PG (ref 26–34)
MCHC RBC AUTO-ENTMCNC: 33.5 G/DL (ref 31–37)
MCV RBC AUTO: 90.4 FL (ref 80–100)
PDW BLD-RTO: 14.5 % (ref 12.5–15.4)
PLATELET # BLD: 380 K/UL (ref 140–450)
PMV BLD AUTO: 7.6 FL (ref 6–12)
POTASSIUM SERPL-SCNC: 4.2 MMOL/L (ref 3.7–5.3)
RBC # BLD: 3.44 M/UL (ref 4–5.2)
SODIUM BLD-SCNC: 139 MMOL/L (ref 135–144)
WBC # BLD: 12.7 K/UL (ref 3.5–11)

## 2017-02-26 PROCEDURE — G8980 MOBILITY D/C STATUS: HCPCS

## 2017-02-26 PROCEDURE — 6370000000 HC RX 637 (ALT 250 FOR IP): Performed by: INTERNAL MEDICINE

## 2017-02-26 PROCEDURE — 99232 SBSQ HOSP IP/OBS MODERATE 35: CPT | Performed by: FAMILY MEDICINE

## 2017-02-26 PROCEDURE — 2580000003 HC RX 258: Performed by: INTERNAL MEDICINE

## 2017-02-26 PROCEDURE — G8979 MOBILITY GOAL STATUS: HCPCS

## 2017-02-26 PROCEDURE — 97116 GAIT TRAINING THERAPY: CPT

## 2017-02-26 PROCEDURE — 80048 BASIC METABOLIC PNL TOTAL CA: CPT

## 2017-02-26 PROCEDURE — G8978 MOBILITY CURRENT STATUS: HCPCS

## 2017-02-26 PROCEDURE — 6360000002 HC RX W HCPCS: Performed by: INTERNAL MEDICINE

## 2017-02-26 PROCEDURE — 85027 COMPLETE CBC AUTOMATED: CPT

## 2017-02-26 PROCEDURE — 6370000000 HC RX 637 (ALT 250 FOR IP): Performed by: PODIATRIST

## 2017-02-26 PROCEDURE — 97162 PT EVAL MOD COMPLEX 30 MIN: CPT

## 2017-02-26 PROCEDURE — 36415 COLL VENOUS BLD VENIPUNCTURE: CPT

## 2017-02-26 RX ORDER — FLECAINIDE ACETATE 50 MG/1
50 TABLET ORAL EVERY 12 HOURS SCHEDULED
Qty: 60 TABLET | Refills: 3 | Status: SHIPPED | OUTPATIENT
Start: 2017-02-26 | End: 2017-06-15 | Stop reason: ALTCHOICE

## 2017-02-26 RX ORDER — HYDROCODONE BITARTRATE AND ACETAMINOPHEN 5; 325 MG/1; MG/1
2 TABLET ORAL EVERY 6 HOURS PRN
Qty: 40 TABLET | Refills: 0 | Status: SHIPPED | OUTPATIENT
Start: 2017-02-26 | End: 2017-03-05

## 2017-02-26 RX ORDER — CEPHALEXIN 500 MG/1
500 CAPSULE ORAL 2 TIMES DAILY
Qty: 10 CAPSULE | Refills: 0 | Status: SHIPPED | OUTPATIENT
Start: 2017-02-25 | End: 2017-03-02

## 2017-02-26 RX ORDER — DILTIAZEM HYDROCHLORIDE 120 MG/1
120 CAPSULE, COATED, EXTENDED RELEASE ORAL DAILY
Qty: 30 CAPSULE | Refills: 3 | Status: SHIPPED | OUTPATIENT
Start: 2017-02-26 | End: 2017-06-15 | Stop reason: DRUGHIGH

## 2017-02-26 RX ADMIN — HYDROCODONE BITARTRATE AND ACETAMINOPHEN 2 TABLET: 5; 325 TABLET ORAL at 01:27

## 2017-02-26 RX ADMIN — HYDROCODONE BITARTRATE AND ACETAMINOPHEN 2 TABLET: 5; 325 TABLET ORAL at 09:56

## 2017-02-26 RX ADMIN — CEPHALEXIN 500 MG: 500 CAPSULE ORAL at 09:00

## 2017-02-26 RX ADMIN — FLECAINIDE ACETATE 50 MG: 50 TABLET ORAL at 09:57

## 2017-02-26 RX ADMIN — ENOXAPARIN SODIUM 90 MG: 100 INJECTION SUBCUTANEOUS at 09:57

## 2017-02-26 RX ADMIN — Medication 10 ML: at 09:58

## 2017-02-26 RX ADMIN — HYDROCODONE BITARTRATE AND ACETAMINOPHEN 2 TABLET: 5; 325 TABLET ORAL at 19:11

## 2017-02-26 RX ADMIN — DILTIAZEM HYDROCHLORIDE 120 MG: 120 CAPSULE, COATED, EXTENDED RELEASE ORAL at 09:57

## 2017-02-26 RX ADMIN — HYDROCODONE BITARTRATE AND ACETAMINOPHEN 2 TABLET: 5; 325 TABLET ORAL at 14:00

## 2017-02-26 ASSESSMENT — ENCOUNTER SYMPTOMS
SINUS PRESSURE: 0
NAUSEA: 0
DIARRHEA: 0
WHEEZING: 0
SORE THROAT: 0
CONSTIPATION: 0
SHORTNESS OF BREATH: 0
ABDOMINAL PAIN: 0
VOICE CHANGE: 0
VOMITING: 0
BLOOD IN STOOL: 0
COUGH: 0

## 2017-02-26 ASSESSMENT — PAIN SCALES - GENERAL
PAINLEVEL_OUTOF10: 7
PAINLEVEL_OUTOF10: 4
PAINLEVEL_OUTOF10: 10
PAINLEVEL_OUTOF10: 8
PAINLEVEL_OUTOF10: 7

## 2017-02-26 ASSESSMENT — PAIN DESCRIPTION - ORIENTATION: ORIENTATION: RIGHT

## 2017-02-26 ASSESSMENT — PAIN DESCRIPTION - PAIN TYPE: TYPE: ACUTE PAIN

## 2017-02-26 ASSESSMENT — PAIN DESCRIPTION - LOCATION: LOCATION: LEG

## 2017-03-09 ENCOUNTER — OFFICE VISIT (OUTPATIENT)
Dept: CARDIOLOGY | Facility: CLINIC | Age: 67
End: 2017-03-09

## 2017-03-09 VITALS
SYSTOLIC BLOOD PRESSURE: 127 MMHG | BODY MASS INDEX: 28.56 KG/M2 | DIASTOLIC BLOOD PRESSURE: 79 MMHG | HEIGHT: 67 IN | HEART RATE: 84 BPM | WEIGHT: 182 LBS

## 2017-03-09 DIAGNOSIS — Z98.890 S/P ABLATION OF ACCESSORY BYPASS TRACT: ICD-10-CM

## 2017-03-09 DIAGNOSIS — I25.10 CORONARY ARTERY CALCIFICATION: ICD-10-CM

## 2017-03-09 DIAGNOSIS — I25.84 CORONARY ARTERY CALCIFICATION: ICD-10-CM

## 2017-03-09 DIAGNOSIS — I47.1 PSVT (PAROXYSMAL SUPRAVENTRICULAR TACHYCARDIA) (HCC): Primary | ICD-10-CM

## 2017-03-09 DIAGNOSIS — Z79.899 ENCOUNTER FOR MONITORING FLECAINIDE THERAPY: ICD-10-CM

## 2017-03-09 DIAGNOSIS — Z51.81 ENCOUNTER FOR MONITORING FLECAINIDE THERAPY: ICD-10-CM

## 2017-03-09 DIAGNOSIS — I44.7 LBBB (LEFT BUNDLE BRANCH BLOCK): ICD-10-CM

## 2017-03-09 PROCEDURE — 99214 OFFICE O/P EST MOD 30 MIN: CPT | Performed by: INTERNAL MEDICINE

## 2017-04-03 ENCOUNTER — HOSPITAL ENCOUNTER (OUTPATIENT)
Dept: PHYSICAL THERAPY | Age: 67
Setting detail: THERAPIES SERIES
Discharge: HOME | End: 2017-04-03
Payer: MEDICARE

## 2017-04-03 PROCEDURE — G0283 ELEC STIM OTHER THAN WOUND: HCPCS

## 2017-04-03 PROCEDURE — 97110 THERAPEUTIC EXERCISES: CPT

## 2017-04-03 PROCEDURE — G8978 MOBILITY CURRENT STATUS: HCPCS

## 2017-04-03 PROCEDURE — G8979 MOBILITY GOAL STATUS: HCPCS

## 2017-04-03 PROCEDURE — 97162 PT EVAL MOD COMPLEX 30 MIN: CPT

## 2017-04-03 ASSESSMENT — PAIN DESCRIPTION - ORIENTATION: ORIENTATION: RIGHT

## 2017-04-03 ASSESSMENT — PAIN DESCRIPTION - LOCATION: LOCATION: ANKLE

## 2017-04-03 ASSESSMENT — PAIN SCALES - GENERAL: PAINLEVEL_OUTOF10: 3

## 2017-04-07 ENCOUNTER — HOSPITAL ENCOUNTER (OUTPATIENT)
Dept: PHYSICAL THERAPY | Age: 67
Setting detail: THERAPIES SERIES
Discharge: HOME | End: 2017-04-07
Payer: MEDICARE

## 2017-04-07 PROCEDURE — 97140 MANUAL THERAPY 1/> REGIONS: CPT

## 2017-04-07 PROCEDURE — G0283 ELEC STIM OTHER THAN WOUND: HCPCS

## 2017-04-07 PROCEDURE — 97110 THERAPEUTIC EXERCISES: CPT

## 2017-04-07 ASSESSMENT — PAIN SCALES - GENERAL: PAINLEVEL_OUTOF10: 3

## 2017-04-11 ENCOUNTER — HOSPITAL ENCOUNTER (OUTPATIENT)
Dept: PHYSICAL THERAPY | Age: 67
Setting detail: THERAPIES SERIES
Discharge: HOME | End: 2017-04-11
Payer: MEDICARE

## 2017-04-11 PROCEDURE — G0283 ELEC STIM OTHER THAN WOUND: HCPCS

## 2017-04-11 PROCEDURE — 97110 THERAPEUTIC EXERCISES: CPT

## 2017-04-11 ASSESSMENT — PAIN SCALES - GENERAL: PAINLEVEL_OUTOF10: 2

## 2017-04-12 ENCOUNTER — HOSPITAL ENCOUNTER (OUTPATIENT)
Dept: PHYSICAL THERAPY | Age: 67
Setting detail: THERAPIES SERIES
Discharge: HOME | End: 2017-04-12
Payer: MEDICARE

## 2017-04-12 PROCEDURE — 97110 THERAPEUTIC EXERCISES: CPT

## 2017-04-12 PROCEDURE — 97140 MANUAL THERAPY 1/> REGIONS: CPT

## 2017-04-12 PROCEDURE — G0283 ELEC STIM OTHER THAN WOUND: HCPCS

## 2017-04-12 ASSESSMENT — PAIN DESCRIPTION - LOCATION: LOCATION: ANKLE

## 2017-04-12 ASSESSMENT — PAIN DESCRIPTION - ORIENTATION: ORIENTATION: RIGHT

## 2017-04-12 ASSESSMENT — PAIN SCALES - GENERAL: PAINLEVEL_OUTOF10: 2

## 2017-04-14 ENCOUNTER — HOSPITAL ENCOUNTER (OUTPATIENT)
Dept: PHYSICAL THERAPY | Age: 67
Setting detail: THERAPIES SERIES
Discharge: HOME | End: 2017-04-14
Payer: MEDICARE

## 2017-04-14 PROCEDURE — 97110 THERAPEUTIC EXERCISES: CPT

## 2017-04-14 PROCEDURE — G0283 ELEC STIM OTHER THAN WOUND: HCPCS

## 2017-04-14 PROCEDURE — 97140 MANUAL THERAPY 1/> REGIONS: CPT

## 2017-04-14 ASSESSMENT — PAIN SCALES - GENERAL: PAINLEVEL_OUTOF10: 2

## 2017-04-17 ENCOUNTER — HOSPITAL ENCOUNTER (OUTPATIENT)
Dept: PHYSICAL THERAPY | Age: 67
Setting detail: THERAPIES SERIES
Discharge: HOME | End: 2017-04-17
Payer: MEDICARE

## 2017-04-17 PROCEDURE — G0283 ELEC STIM OTHER THAN WOUND: HCPCS

## 2017-04-17 PROCEDURE — 97110 THERAPEUTIC EXERCISES: CPT

## 2017-04-17 ASSESSMENT — PAIN SCALES - GENERAL: PAINLEVEL_OUTOF10: 1

## 2017-04-19 ENCOUNTER — HOSPITAL ENCOUNTER (OUTPATIENT)
Dept: PHYSICAL THERAPY | Age: 67
Setting detail: THERAPIES SERIES
Discharge: HOME | End: 2017-04-19
Payer: MEDICARE

## 2017-04-19 ENCOUNTER — TELEPHONE (OUTPATIENT)
Dept: CARDIOLOGY | Age: 67
End: 2017-04-19

## 2017-04-19 PROCEDURE — 97110 THERAPEUTIC EXERCISES: CPT

## 2017-04-19 ASSESSMENT — PAIN SCALES - GENERAL: PAINLEVEL_OUTOF10: 1

## 2017-04-21 ENCOUNTER — HOSPITAL ENCOUNTER (OUTPATIENT)
Dept: PHYSICAL THERAPY | Age: 67
Setting detail: THERAPIES SERIES
Discharge: HOME | End: 2017-04-21
Payer: MEDICARE

## 2017-04-21 PROCEDURE — 97110 THERAPEUTIC EXERCISES: CPT

## 2017-04-21 PROCEDURE — G0283 ELEC STIM OTHER THAN WOUND: HCPCS

## 2017-04-21 ASSESSMENT — PAIN SCALES - GENERAL: PAINLEVEL_OUTOF10: 1

## 2017-04-24 ENCOUNTER — HOSPITAL ENCOUNTER (OUTPATIENT)
Dept: PHYSICAL THERAPY | Age: 67
Setting detail: THERAPIES SERIES
Discharge: HOME | End: 2017-04-24
Payer: MEDICARE

## 2017-04-24 PROCEDURE — 97110 THERAPEUTIC EXERCISES: CPT

## 2017-04-24 ASSESSMENT — PAIN SCALES - GENERAL: PAINLEVEL_OUTOF10: 1

## 2017-04-26 ENCOUNTER — HOSPITAL ENCOUNTER (OUTPATIENT)
Dept: PHYSICAL THERAPY | Age: 67
Setting detail: THERAPIES SERIES
Discharge: HOME | End: 2017-04-26
Payer: MEDICARE

## 2017-04-26 PROCEDURE — 97110 THERAPEUTIC EXERCISES: CPT

## 2017-04-26 PROCEDURE — G0283 ELEC STIM OTHER THAN WOUND: HCPCS

## 2017-04-26 PROCEDURE — 97140 MANUAL THERAPY 1/> REGIONS: CPT

## 2017-04-26 ASSESSMENT — PAIN SCALES - GENERAL: PAINLEVEL_OUTOF10: 1

## 2017-04-27 ENCOUNTER — HOSPITAL ENCOUNTER (OUTPATIENT)
Dept: PHYSICAL THERAPY | Age: 67
Setting detail: THERAPIES SERIES
Discharge: HOME | End: 2017-04-27
Payer: MEDICARE

## 2017-04-27 PROCEDURE — G0283 ELEC STIM OTHER THAN WOUND: HCPCS

## 2017-04-27 PROCEDURE — G8979 MOBILITY GOAL STATUS: HCPCS

## 2017-04-27 PROCEDURE — G8978 MOBILITY CURRENT STATUS: HCPCS

## 2017-04-27 PROCEDURE — 97110 THERAPEUTIC EXERCISES: CPT

## 2017-04-28 ENCOUNTER — APPOINTMENT (OUTPATIENT)
Dept: PHYSICAL THERAPY | Age: 67
End: 2017-04-28
Payer: MEDICARE

## 2017-06-15 ENCOUNTER — HOSPITAL ENCOUNTER (OUTPATIENT)
Age: 67
Discharge: HOME | End: 2017-06-15
Payer: MEDICARE

## 2017-06-15 ENCOUNTER — OFFICE VISIT (OUTPATIENT)
Dept: CARDIOLOGY | Age: 67
End: 2017-06-15
Payer: MEDICARE

## 2017-06-15 VITALS
HEART RATE: 93 BPM | SYSTOLIC BLOOD PRESSURE: 106 MMHG | BODY MASS INDEX: 32.22 KG/M2 | WEIGHT: 193.4 LBS | HEIGHT: 65 IN | OXYGEN SATURATION: 98 % | DIASTOLIC BLOOD PRESSURE: 71 MMHG

## 2017-06-15 DIAGNOSIS — I44.7 LEFT BUNDLE BRANCH BLOCK: ICD-10-CM

## 2017-06-15 DIAGNOSIS — E78.5 DYSLIPIDEMIA: ICD-10-CM

## 2017-06-15 DIAGNOSIS — I47.1 PSVT (PAROXYSMAL SUPRAVENTRICULAR TACHYCARDIA) (HCC): ICD-10-CM

## 2017-06-15 DIAGNOSIS — E87.6 HYPOKALEMIA: ICD-10-CM

## 2017-06-15 DIAGNOSIS — I47.1 PSVT (PAROXYSMAL SUPRAVENTRICULAR TACHYCARDIA) (HCC): Primary | ICD-10-CM

## 2017-06-15 DIAGNOSIS — I10 ESSENTIAL HYPERTENSION: ICD-10-CM

## 2017-06-15 LAB
ANION GAP SERPL CALCULATED.3IONS-SCNC: 16 MMOL/L (ref 9–17)
BUN BLDV-MCNC: 30 MG/DL (ref 8–23)
BUN/CREAT BLD: 29 (ref 9–20)
CALCIUM SERPL-MCNC: 9.6 MG/DL (ref 8.6–10.4)
CHLORIDE BLD-SCNC: 95 MMOL/L (ref 98–107)
CO2: 30 MMOL/L (ref 20–31)
CREAT SERPL-MCNC: 1.02 MG/DL (ref 0.5–0.9)
GFR AFRICAN AMERICAN: >60 ML/MIN
GFR NON-AFRICAN AMERICAN: 54 ML/MIN
GFR SERPL CREATININE-BSD FRML MDRD: ABNORMAL ML/MIN/{1.73_M2}
GFR SERPL CREATININE-BSD FRML MDRD: ABNORMAL ML/MIN/{1.73_M2}
GLUCOSE BLD-MCNC: 185 MG/DL (ref 70–99)
POTASSIUM SERPL-SCNC: 3.6 MMOL/L (ref 3.7–5.3)
SODIUM BLD-SCNC: 141 MMOL/L (ref 135–144)

## 2017-06-15 PROCEDURE — 1036F TOBACCO NON-USER: CPT | Performed by: INTERNAL MEDICINE

## 2017-06-15 PROCEDURE — 4040F PNEUMOC VAC/ADMIN/RCVD: CPT | Performed by: INTERNAL MEDICINE

## 2017-06-15 PROCEDURE — 80048 BASIC METABOLIC PNL TOTAL CA: CPT

## 2017-06-15 PROCEDURE — 99214 OFFICE O/P EST MOD 30 MIN: CPT | Performed by: INTERNAL MEDICINE

## 2017-06-15 PROCEDURE — 1090F PRES/ABSN URINE INCON ASSESS: CPT | Performed by: INTERNAL MEDICINE

## 2017-06-15 PROCEDURE — 1123F ACP DISCUSS/DSCN MKR DOCD: CPT | Performed by: INTERNAL MEDICINE

## 2017-06-15 PROCEDURE — G8400 PT W/DXA NO RESULTS DOC: HCPCS | Performed by: INTERNAL MEDICINE

## 2017-06-15 PROCEDURE — G8427 DOCREV CUR MEDS BY ELIG CLIN: HCPCS | Performed by: INTERNAL MEDICINE

## 2017-06-15 PROCEDURE — G8417 CALC BMI ABV UP PARAM F/U: HCPCS | Performed by: INTERNAL MEDICINE

## 2017-06-15 PROCEDURE — 36415 COLL VENOUS BLD VENIPUNCTURE: CPT

## 2017-06-15 PROCEDURE — 3017F COLORECTAL CA SCREEN DOC REV: CPT | Performed by: INTERNAL MEDICINE

## 2017-06-15 PROCEDURE — 3014F SCREEN MAMMO DOC REV: CPT | Performed by: INTERNAL MEDICINE

## 2017-06-15 PROCEDURE — G8598 ASA/ANTIPLAT THER USED: HCPCS | Performed by: INTERNAL MEDICINE

## 2017-06-15 RX ORDER — POTASSIUM CHLORIDE 20 MEQ/1
20 TABLET, EXTENDED RELEASE ORAL DAILY
Qty: 30 TABLET | Refills: 3 | Status: SHIPPED | OUTPATIENT
Start: 2017-06-15 | End: 2017-11-08 | Stop reason: SDUPTHER

## 2017-06-15 RX ORDER — LORAZEPAM 1 MG/1
1 TABLET ORAL 2 TIMES DAILY
COMMUNITY
End: 2018-07-17 | Stop reason: ALTCHOICE

## 2017-06-15 RX ORDER — FUROSEMIDE 80 MG
80 TABLET ORAL 2 TIMES DAILY
COMMUNITY
End: 2017-06-15 | Stop reason: SDUPTHER

## 2017-06-15 RX ORDER — DILTIAZEM HYDROCHLORIDE 240 MG/1
240 CAPSULE, EXTENDED RELEASE ORAL DAILY
COMMUNITY

## 2017-06-15 RX ORDER — FUROSEMIDE 40 MG/1
40 TABLET ORAL 2 TIMES DAILY
Qty: 60 TABLET | Refills: 3 | Status: SHIPPED | OUTPATIENT
Start: 2017-06-15 | End: 2017-11-15 | Stop reason: SDUPTHER

## 2017-06-16 ENCOUNTER — TELEPHONE (OUTPATIENT)
Dept: CARDIOLOGY | Age: 67
End: 2017-06-16

## 2017-11-08 ENCOUNTER — TELEPHONE (OUTPATIENT)
Dept: CARDIOLOGY | Age: 67
End: 2017-11-08

## 2017-11-08 RX ORDER — POTASSIUM CHLORIDE 20 MEQ/1
20 TABLET, EXTENDED RELEASE ORAL DAILY
Qty: 30 TABLET | Refills: 3 | Status: SHIPPED | OUTPATIENT
Start: 2017-11-08 | End: 2018-06-22 | Stop reason: ALTCHOICE

## 2017-11-15 RX ORDER — FUROSEMIDE 40 MG/1
40 TABLET ORAL 2 TIMES DAILY
Qty: 180 TABLET | Refills: 3 | Status: SHIPPED | OUTPATIENT
Start: 2017-11-15 | End: 2018-08-10 | Stop reason: DRUGHIGH

## 2017-11-15 NOTE — TELEPHONE ENCOUNTER
Patient called for refill of her Lasix 40mg bid. She is going to be out before her next appt with Dr Jonathan Foy in December.   [address] [facility]   Thanks

## 2017-12-20 ENCOUNTER — OFFICE VISIT (OUTPATIENT)
Dept: CARDIOLOGY | Age: 67
End: 2017-12-20
Payer: MEDICARE

## 2017-12-20 ENCOUNTER — TELEPHONE (OUTPATIENT)
Dept: CARDIOLOGY | Age: 67
End: 2017-12-20

## 2017-12-20 ENCOUNTER — HOSPITAL ENCOUNTER (OUTPATIENT)
Age: 67
Discharge: HOME | End: 2017-12-20
Payer: MEDICARE

## 2017-12-20 VITALS
SYSTOLIC BLOOD PRESSURE: 103 MMHG | OXYGEN SATURATION: 96 % | BODY MASS INDEX: 30.29 KG/M2 | WEIGHT: 193 LBS | HEART RATE: 90 BPM | RESPIRATION RATE: 20 BRPM | HEIGHT: 67 IN | DIASTOLIC BLOOD PRESSURE: 72 MMHG

## 2017-12-20 DIAGNOSIS — I50.32 CHRONIC DIASTOLIC CHF (CONGESTIVE HEART FAILURE), NYHA CLASS 3 (HCC): ICD-10-CM

## 2017-12-20 DIAGNOSIS — I25.10 CORONARY ARTERY CALCIFICATION: ICD-10-CM

## 2017-12-20 DIAGNOSIS — I25.84 CORONARY ARTERY CALCIFICATION: ICD-10-CM

## 2017-12-20 DIAGNOSIS — I47.1 PAROXYSMAL SUPRAVENTRICULAR TACHYCARDIA (HCC): ICD-10-CM

## 2017-12-20 DIAGNOSIS — I44.7 LBBB (LEFT BUNDLE BRANCH BLOCK): ICD-10-CM

## 2017-12-20 DIAGNOSIS — I47.1 PAROXYSMAL SUPRAVENTRICULAR TACHYCARDIA (HCC): Primary | ICD-10-CM

## 2017-12-20 LAB
ANION GAP SERPL CALCULATED.3IONS-SCNC: 16 MMOL/L (ref 9–17)
BUN BLDV-MCNC: 16 MG/DL (ref 8–23)
BUN/CREAT BLD: 21 (ref 9–20)
CALCIUM SERPL-MCNC: 9.3 MG/DL (ref 8.6–10.4)
CHLORIDE BLD-SCNC: 98 MMOL/L (ref 98–107)
CO2: 28 MMOL/L (ref 20–31)
CREAT SERPL-MCNC: 0.78 MG/DL (ref 0.5–0.9)
GFR AFRICAN AMERICAN: >60 ML/MIN
GFR NON-AFRICAN AMERICAN: >60 ML/MIN
GFR SERPL CREATININE-BSD FRML MDRD: ABNORMAL ML/MIN/{1.73_M2}
GFR SERPL CREATININE-BSD FRML MDRD: ABNORMAL ML/MIN/{1.73_M2}
GLUCOSE BLD-MCNC: 130 MG/DL (ref 70–99)
POTASSIUM SERPL-SCNC: 3.8 MMOL/L (ref 3.7–5.3)
SODIUM BLD-SCNC: 142 MMOL/L (ref 135–144)

## 2017-12-20 PROCEDURE — 99213 OFFICE O/P EST LOW 20 MIN: CPT | Performed by: INTERNAL MEDICINE

## 2017-12-20 PROCEDURE — G8598 ASA/ANTIPLAT THER USED: HCPCS | Performed by: INTERNAL MEDICINE

## 2017-12-20 PROCEDURE — 80048 BASIC METABOLIC PNL TOTAL CA: CPT

## 2017-12-20 PROCEDURE — G8400 PT W/DXA NO RESULTS DOC: HCPCS | Performed by: INTERNAL MEDICINE

## 2017-12-20 PROCEDURE — G8484 FLU IMMUNIZE NO ADMIN: HCPCS | Performed by: INTERNAL MEDICINE

## 2017-12-20 PROCEDURE — 4040F PNEUMOC VAC/ADMIN/RCVD: CPT | Performed by: INTERNAL MEDICINE

## 2017-12-20 PROCEDURE — 1090F PRES/ABSN URINE INCON ASSESS: CPT | Performed by: INTERNAL MEDICINE

## 2017-12-20 PROCEDURE — G8417 CALC BMI ABV UP PARAM F/U: HCPCS | Performed by: INTERNAL MEDICINE

## 2017-12-20 PROCEDURE — 3017F COLORECTAL CA SCREEN DOC REV: CPT | Performed by: INTERNAL MEDICINE

## 2017-12-20 PROCEDURE — 3014F SCREEN MAMMO DOC REV: CPT | Performed by: INTERNAL MEDICINE

## 2017-12-20 PROCEDURE — 1036F TOBACCO NON-USER: CPT | Performed by: INTERNAL MEDICINE

## 2017-12-20 PROCEDURE — 1123F ACP DISCUSS/DSCN MKR DOCD: CPT | Performed by: INTERNAL MEDICINE

## 2017-12-20 PROCEDURE — G8427 DOCREV CUR MEDS BY ELIG CLIN: HCPCS | Performed by: INTERNAL MEDICINE

## 2017-12-20 PROCEDURE — 36415 COLL VENOUS BLD VENIPUNCTURE: CPT

## 2017-12-20 RX ORDER — METFORMIN HYDROCHLORIDE 500 MG/1
500 TABLET, EXTENDED RELEASE ORAL
COMMUNITY
Start: 2017-11-20 | End: 2018-06-22 | Stop reason: ALTCHOICE

## 2017-12-21 NOTE — PROGRESS NOTES
Subjective:     CHIEF COMPLAINT / HPI:    Chief Complaint   Patient presents with    6 Month Follow-Up     HX: PSVT, LBBB, HTN. Last visit decreased her Lasix to 40 mg BID and started Potassium chloride 20 meq daily. Pt states she is doing pretty good. C/o: Every once in awhile she gets a flutter about once a week. SOB upon exertion. Denies: CP, lightheaded/dizziness     Dear  Chelsie Sánchez CNP    I had the pleasure of seeing Jeannine Patel for follow up today. Cl Eason is 74 y.o. female with no prior cardiac history who presented today for follow-up. 1-Patient initially seen  In the emergency room on 10/25/2016 with palpitations, chest pain and near syncope. Admission ECG showed supraventricular tachycardia with HR of 210 bpm, converted to normal sinus rhythm with 6 mg of adenosine. She has baseline left bundle branch block on ECG. 2-Patient denies any prior history of CAD, myocardial infarction or heart failure. No prior history of cardiac catheterization. Her CT of the chest showed aortic and coronary artery calcification. 3-Stress test (done at [facility]) showed probably normal myocardial perfusion with no inducible ischemia. There was medium sized fixed perfusion defect at the septum, most likely artifact. Normal ejection fraction and wall motion. 4-Another visit to the emergency room on 12/4/2016 with wide complex tachycardia, treated with amiodarone and transferred to [facility] [place] for further management. 5-Patient S/P ablation of left sided accessory pathway mediating her SVT on 2/24/2017. She is here today for follow up. Doing well overall. Infrequent short episodes of palpitations without dizziness or lightheadedness. No chest pain or worsening shortness of breath. No significant change in weight. No problems with medications.     Past Medical History:    Past Medical History:   Diagnosis Date    Allergic rhinitis     Facial weakness ~ 2002 traumatic injury    H/O cardiovascular stress test 11/28/2016    Probably normal myocardial perfusion SPECT imagin. There is no evidence of ischemia. There is a fixed defect in septum of uncertain etiology but potentially due to LBBB, attenuation artifact or less likely prior infarct. Normal wall motion and wall thickening.  H/O echocardiogram 11/04/2016    Hypertension 2011    Psoriasis     S/P ablation of atrial flutter 02/21/2017    Dr. Justin Muniz    Trochanteric bursitis 2013    right   paroxysmal supraventricular tachycardia  left bundle branch block  coronary artery calcification on chest CT. Past Surgical History:  Past Surgical History:   Procedure Laterality Date    ANKLE FRACTURE SURGERY Right 02/25/2017    ANKLE FRACTURE SURGERY Right 2/25/2017    ANKLE OPEN REDUCTION INTERNAL FIXATION-PARAGON.   APPLCATION OF A SPLINT performed by Cuco Markham DPM at [address] Right 02/25/2017    ORIF    APPENDECTOMY  1970'S    HERNIA REPAIR      ventral    HYSTERECTOMY  1978    STILL HAS OVARIES    TOE SURGERY  10 - 15 years ago    5th left toe    UMBILICAL HERNIA REPAIR  03/12     Social History:    History   Smoking Status    Former Smoker    Packs/day: 0.50    Years: 40.00    Types: Cigarettes    Quit date: 10/25/2016   Smokeless Tobacco    Never Used     Current Medications:  Outpatient Prescriptions Marked as Taking for the 12/20/17 encounter (Office Visit) with Jane Shipley MD   Medication Sig Dispense Refill    metFORMIN (GLUCOPHAGE-XR) 500 MG extended release tablet Take 500 mg by mouth daily (with breakfast)       furosemide (LASIX) 40 MG tablet Take 1 tablet by mouth 2 times daily 180 tablet 3    potassium chloride (KLOR-CON M) 20 MEQ extended release tablet Take 1 tablet by mouth daily 30 tablet 3    diltiazem (DILACOR XR) 240 MG extended release capsule Take 240 mg by mouth daily      LORazepam (ATIVAN) 1 MG tablet Take 1 mg by mouth 2 times daily      atorvastatin (LIPITOR) 20 MG tablet Take 1 tablet by mouth nightly      diphenhydrAMINE (BENADRYL) 25 MG tablet Take 25 mg by mouth every 6 hours as needed for Allergies      Ascorbic Acid (VITAMIN C) 500 MG tablet Take 500 mg by mouth daily      vitamin E 1000 UNITS capsule Take 1,000 Units by mouth daily      Multiple Vitamin (MULTIVITAMIN PO) Take  by mouth.  ibuprofen (ADVIL;MOTRIN) 200 MG CAPS Take 1 capsule by mouth as needed. REVIEW OF SYSTEMS:    CONSTITUTIONAL: No major weight gain or loss. HEENT: No new vision difficulties or ringing in the ears. RESPIRATORY: No new SOB, PND, orthopnea or cough. CARDIOVASCULAR: See HPI  GI: No nausea, vomiting, diarrhea, constipation, abdominal pain or changes in bowel habits. : No urinary frequency, urgency, incontinence hematuria or dysuria. SKIN: No cyanosis or skin lesions. MUSCULOSKELETAL: No new muscle or joint pain. NEUROLOGICAL: No syncope or TIA-like symptoms. PSYCHIATRIC: No anxiety, pain, insomnia or depression    Objective:     PHYSICAL EXAM:      VITALS:    /72 (Site: Left Arm, Position: Sitting, Cuff Size: Large Adult)   Pulse 90   Resp 20   Ht 5' 7\" (1.702 m)   Wt 193 lb (87.5 kg)   LMP 03/19/1992   SpO2 96%   BMI 30.23 kg/m²   CONSTITUTIONAL: Cooperative, no apparent distress, and appears well nourished / developed. NEUROLOGIC:  Awake and orientated to person, place and time. PSYCH: Calm affect. SKIN: Warm and dry. HEENT: Sclera non-icteric, normocephalic, neck supple, no elevation of JVP, normal carotid pulses with no bruits and thyroid normal size. LUNGS:  No increased work of breathing and clear to auscultation, no crackles or wheezing. CARDIOVASCULAR:  Regular rate and rhythm with no murmurs, gallops, rubs, or abnormal heart sounds. The apical impulses not displaced. The carotid upstroke is normal in amplitude and contour without delay or bruit. JVP is not elevated.   ABDOMEN:  Normal bowel sounds, non-distended and non-tender to palpation. EXT: mild bilateral ankle edema. DATA:    Lab Results   Component Value Date    ALT 18 02/22/2017    AST 13 02/22/2017    ALKPHOS 96 02/22/2017    BILITOT 0.28 (L) 02/22/2017     Lab Results   Component Value Date    CREATININE 0.78 12/20/2017    BUN 16 12/20/2017     12/20/2017    K 3.8 12/20/2017    CL 98 12/20/2017    CO2 28 12/20/2017     Lab Results   Component Value Date    TSH 0.83 10/25/2016     Lab Results   Component Value Date    WBC 12.7 (H) 02/26/2017    HGB 10.4 (L) 02/26/2017    HCT 31.1 (L) 02/26/2017    MCV 90.4 02/26/2017     02/26/2017       Assessment:     1. Paroxysmal supraventricular tachycardia (HCC)  Basic Metabolic Panel   2. LBBB (left bundle branch block)     3. Coronary artery calcification     4. Chronic diastolic CHF (congestive heart failure), NYHA class 3 (HCC)         Plan:     Recurrent wide complex tachycardia, most likely supraventricular tachycardia with aberrant conduction. Baseline left bundle branch block. Coronary artery calcifications on CT of the chest.  Normal EF by Echo. Normal stress test without significant ischemia as outlined in HPI. S/P ablation of left sided accessory pathway mediating her SVT. No induction of arrhythmia after ablation. Currently stable from cardiovascular standpoint. Denies any chest pain or worsening shortness of breath. We got BMP today,stable kidney function and serum potassium, continue current diuretic regimen. Counseled regarding good hydration, diet, exercise and avoiding any tobacco products. .    Follow up after 6 months or sooner if needed. Return in about 6 months (around 6/20/2018) for Follow up.       Electronically signed by Anamaria Pope MD on 12/20/2017

## 2018-01-25 ENCOUNTER — HOSPITAL ENCOUNTER (OUTPATIENT)
Dept: WOMENS IMAGING | Age: 68
Discharge: HOME | End: 2018-01-25
Payer: MEDICARE

## 2018-01-25 DIAGNOSIS — Z12.39 BREAST CANCER SCREENING: ICD-10-CM

## 2018-01-25 PROCEDURE — 77067 SCR MAMMO BI INCL CAD: CPT

## 2018-05-16 ENCOUNTER — HOSPITAL ENCOUNTER (OUTPATIENT)
Age: 68
Discharge: HOME | End: 2018-05-16
Payer: MEDICARE

## 2018-05-16 LAB
ABSOLUTE EOS #: 0.33 K/UL (ref 0–0.44)
ABSOLUTE IMMATURE GRANULOCYTE: 0.04 K/UL (ref 0–0.3)
ABSOLUTE LYMPH #: 1.39 K/UL (ref 1.1–3.7)
ABSOLUTE MONO #: 0.77 K/UL (ref 0.1–1.2)
ALBUMIN SERPL-MCNC: 4.2 G/DL (ref 3.5–5.2)
ALBUMIN/GLOBULIN RATIO: 1.6 (ref 1–2.5)
ALP BLD-CCNC: 100 U/L (ref 35–104)
ALT SERPL-CCNC: 21 U/L (ref 5–33)
ANION GAP SERPL CALCULATED.3IONS-SCNC: 13 MMOL/L (ref 9–17)
AST SERPL-CCNC: 20 U/L
BASOPHILS # BLD: 1 % (ref 0–2)
BASOPHILS ABSOLUTE: 0.1 K/UL (ref 0–0.2)
BILIRUB SERPL-MCNC: 0.29 MG/DL (ref 0.3–1.2)
BUN BLDV-MCNC: 17 MG/DL (ref 8–23)
BUN/CREAT BLD: 21 (ref 9–20)
CALCIUM SERPL-MCNC: 9.4 MG/DL (ref 8.6–10.4)
CHLORIDE BLD-SCNC: 102 MMOL/L (ref 98–107)
CHOLESTEROL/HDL RATIO: 4.5
CHOLESTEROL: 174 MG/DL
CO2: 29 MMOL/L (ref 20–31)
CREAT SERPL-MCNC: 0.8 MG/DL (ref 0.5–0.9)
DIFFERENTIAL TYPE: ABNORMAL
EOSINOPHILS RELATIVE PERCENT: 4 % (ref 1–4)
GFR AFRICAN AMERICAN: >60 ML/MIN
GFR NON-AFRICAN AMERICAN: >60 ML/MIN
GFR SERPL CREATININE-BSD FRML MDRD: ABNORMAL ML/MIN/{1.73_M2}
GFR SERPL CREATININE-BSD FRML MDRD: ABNORMAL ML/MIN/{1.73_M2}
GLUCOSE BLD-MCNC: 123 MG/DL (ref 70–99)
HCT VFR BLD CALC: 40.2 % (ref 36.3–47.1)
HDLC SERPL-MCNC: 39 MG/DL
HEMOGLOBIN: 13.2 G/DL (ref 11.9–15.1)
HEPATITIS C ANTIBODY: NONREACTIVE
IMMATURE GRANULOCYTES: 0 %
LDL CHOLESTEROL: 68 MG/DL (ref 0–130)
LYMPHOCYTES # BLD: 16 % (ref 24–43)
MCH RBC QN AUTO: 30.8 PG (ref 25.2–33.5)
MCHC RBC AUTO-ENTMCNC: 32.8 G/DL (ref 28.4–34.8)
MCV RBC AUTO: 93.7 FL (ref 82.6–102.9)
MONOCYTES # BLD: 9 % (ref 3–12)
NRBC AUTOMATED: 0 PER 100 WBC
PDW BLD-RTO: 13.4 % (ref 11.8–14.4)
PLATELET # BLD: 254 K/UL (ref 138–453)
PLATELET ESTIMATE: ABNORMAL
PMV BLD AUTO: 10 FL (ref 8.1–13.5)
POTASSIUM SERPL-SCNC: 4.6 MMOL/L (ref 3.7–5.3)
RBC # BLD: 4.29 M/UL (ref 3.95–5.11)
RBC # BLD: ABNORMAL 10*6/UL
SEG NEUTROPHILS: 70 % (ref 36–65)
SEGMENTED NEUTROPHILS ABSOLUTE COUNT: 6.26 K/UL (ref 1.5–8.1)
SODIUM BLD-SCNC: 144 MMOL/L (ref 135–144)
TOTAL PROTEIN: 6.8 G/DL (ref 6.4–8.3)
TRIGL SERPL-MCNC: 334 MG/DL
TSH SERPL DL<=0.05 MIU/L-ACNC: 4.32 MIU/L (ref 0.3–5)
VLDLC SERPL CALC-MCNC: ABNORMAL MG/DL (ref 1–30)
WBC # BLD: 8.9 K/UL (ref 3.5–11.3)
WBC # BLD: ABNORMAL 10*3/UL

## 2018-05-16 PROCEDURE — 84443 ASSAY THYROID STIM HORMONE: CPT

## 2018-05-16 PROCEDURE — 85025 COMPLETE CBC W/AUTO DIFF WBC: CPT

## 2018-05-16 PROCEDURE — 36415 COLL VENOUS BLD VENIPUNCTURE: CPT

## 2018-05-16 PROCEDURE — 80053 COMPREHEN METABOLIC PANEL: CPT

## 2018-05-16 PROCEDURE — 86803 HEPATITIS C AB TEST: CPT

## 2018-05-16 PROCEDURE — 80061 LIPID PANEL: CPT

## 2018-06-22 ENCOUNTER — OFFICE VISIT (OUTPATIENT)
Dept: CARDIOLOGY | Age: 68
End: 2018-06-22
Payer: MEDICARE

## 2018-06-22 VITALS
BODY MASS INDEX: 31.02 KG/M2 | WEIGHT: 193 LBS | HEIGHT: 66 IN | OXYGEN SATURATION: 96 % | SYSTOLIC BLOOD PRESSURE: 135 MMHG | DIASTOLIC BLOOD PRESSURE: 82 MMHG | RESPIRATION RATE: 18 BRPM | HEART RATE: 93 BPM

## 2018-06-22 DIAGNOSIS — I10 ESSENTIAL HYPERTENSION: ICD-10-CM

## 2018-06-22 DIAGNOSIS — I44.7 LBBB (LEFT BUNDLE BRANCH BLOCK): Primary | ICD-10-CM

## 2018-06-22 DIAGNOSIS — I47.1 PAROXYSMAL SUPRAVENTRICULAR TACHYCARDIA (HCC): ICD-10-CM

## 2018-06-22 PROCEDURE — 3017F COLORECTAL CA SCREEN DOC REV: CPT | Performed by: INTERNAL MEDICINE

## 2018-06-22 PROCEDURE — G8427 DOCREV CUR MEDS BY ELIG CLIN: HCPCS | Performed by: INTERNAL MEDICINE

## 2018-06-22 PROCEDURE — G8400 PT W/DXA NO RESULTS DOC: HCPCS | Performed by: INTERNAL MEDICINE

## 2018-06-22 PROCEDURE — G8599 NO ASA/ANTIPLAT THER USE RNG: HCPCS | Performed by: INTERNAL MEDICINE

## 2018-06-22 PROCEDURE — 99213 OFFICE O/P EST LOW 20 MIN: CPT | Performed by: INTERNAL MEDICINE

## 2018-06-22 PROCEDURE — 1090F PRES/ABSN URINE INCON ASSESS: CPT | Performed by: INTERNAL MEDICINE

## 2018-06-22 PROCEDURE — 4040F PNEUMOC VAC/ADMIN/RCVD: CPT | Performed by: INTERNAL MEDICINE

## 2018-06-22 PROCEDURE — 93000 ELECTROCARDIOGRAM COMPLETE: CPT | Performed by: INTERNAL MEDICINE

## 2018-06-22 PROCEDURE — G8417 CALC BMI ABV UP PARAM F/U: HCPCS | Performed by: INTERNAL MEDICINE

## 2018-06-22 PROCEDURE — 1123F ACP DISCUSS/DSCN MKR DOCD: CPT | Performed by: INTERNAL MEDICINE

## 2018-06-22 PROCEDURE — 1036F TOBACCO NON-USER: CPT | Performed by: INTERNAL MEDICINE

## 2018-06-22 RX ORDER — ASPIRIN 81 MG/1
325 TABLET ORAL DAILY
Qty: 30 TABLET | Refills: 3 | Status: SHIPPED | OUTPATIENT
Start: 2018-06-22 | End: 2018-06-22 | Stop reason: ALTCHOICE

## 2018-06-22 RX ORDER — ASPIRIN 325 MG
325 TABLET ORAL DAILY
Qty: 90 TABLET | Refills: 3 | Status: CANCELLED | OUTPATIENT
Start: 2018-06-22

## 2018-06-22 RX ORDER — LORATADINE 10 MG/1
10 CAPSULE, LIQUID FILLED ORAL DAILY
COMMUNITY
End: 2020-02-03

## 2018-07-11 ENCOUNTER — HOSPITAL ENCOUNTER (OUTPATIENT)
Dept: NON INVASIVE DIAGNOSTICS | Age: 68
Discharge: HOME | End: 2018-07-11
Payer: MEDICARE

## 2018-07-11 DIAGNOSIS — I44.7 LBBB (LEFT BUNDLE BRANCH BLOCK): ICD-10-CM

## 2018-07-11 DIAGNOSIS — I47.1 PAROXYSMAL SUPRAVENTRICULAR TACHYCARDIA (HCC): ICD-10-CM

## 2018-07-11 DIAGNOSIS — I10 ESSENTIAL HYPERTENSION: ICD-10-CM

## 2018-07-11 LAB
LV EF: 70 %
LVEF MODALITY: NORMAL

## 2018-07-11 PROCEDURE — 93306 TTE W/DOPPLER COMPLETE: CPT

## 2018-07-16 ENCOUNTER — TELEPHONE (OUTPATIENT)
Dept: CARDIOLOGY | Age: 68
End: 2018-07-16

## 2018-07-16 NOTE — TELEPHONE ENCOUNTER
----- Message from Marcell Jama MD sent at 7/14/2018  5:33 PM EDT -----  Some abnormalities of the heart ultrasound. Nothing dangerous. I need to see her in 3-4 weeks. Please call with questions and/or concerns. Mauro Montes

## 2018-07-17 ENCOUNTER — HOSPITAL ENCOUNTER (OUTPATIENT)
Age: 68
Discharge: HOME | End: 2018-07-17
Payer: MEDICARE

## 2018-07-17 DIAGNOSIS — G24.5 BLEPHAROSPASM: ICD-10-CM

## 2018-07-17 DIAGNOSIS — I49.8 OTHER SPECIFIED CARDIAC ARRHYTHMIAS (CODE): ICD-10-CM

## 2018-07-17 DIAGNOSIS — I44.7 LEFT BUNDLE BRANCH BLOCK: ICD-10-CM

## 2018-07-17 DIAGNOSIS — Z86.79 H/O CARDIAC MURMUR: ICD-10-CM

## 2018-07-17 DIAGNOSIS — R00.0 WIDE-COMPLEX TACHYCARDIA: ICD-10-CM

## 2018-07-17 PROBLEM — G51.39 CLONIC HEMIFACIAL SPASM: Status: ACTIVE | Noted: 2018-07-17

## 2018-07-17 LAB
BUN BLDV-MCNC: 22 MG/DL (ref 8–23)
CREAT SERPL-MCNC: 1.2 MG/DL (ref 0.5–0.9)
FOLATE: >20 NG/ML
GFR AFRICAN AMERICAN: 54 ML/MIN
GFR NON-AFRICAN AMERICAN: 45 ML/MIN
GFR SERPL CREATININE-BSD FRML MDRD: ABNORMAL ML/MIN/{1.73_M2}
GFR SERPL CREATININE-BSD FRML MDRD: ABNORMAL ML/MIN/{1.73_M2}
THYROXINE, FREE: 1.04 NG/DL (ref 0.93–1.7)
TSH SERPL DL<=0.05 MIU/L-ACNC: 5.46 MIU/L (ref 0.3–5)
VITAMIN B-12: 1123 PG/ML (ref 232–1245)

## 2018-07-17 PROCEDURE — 84520 ASSAY OF UREA NITROGEN: CPT

## 2018-07-17 PROCEDURE — 82607 VITAMIN B-12: CPT

## 2018-07-17 PROCEDURE — 84443 ASSAY THYROID STIM HORMONE: CPT

## 2018-07-17 PROCEDURE — 82746 ASSAY OF FOLIC ACID SERUM: CPT

## 2018-07-17 PROCEDURE — 84439 ASSAY OF FREE THYROXINE: CPT

## 2018-07-17 PROCEDURE — 36415 COLL VENOUS BLD VENIPUNCTURE: CPT

## 2018-07-17 PROCEDURE — 82565 ASSAY OF CREATININE: CPT

## 2018-07-19 ENCOUNTER — HOSPITAL ENCOUNTER (OUTPATIENT)
Dept: MRI IMAGING | Age: 68
Discharge: HOME | End: 2018-07-21
Payer: MEDICARE

## 2018-07-19 DIAGNOSIS — R00.0 WIDE-COMPLEX TACHYCARDIA: ICD-10-CM

## 2018-07-19 DIAGNOSIS — G24.5 BLEPHAROSPASM: ICD-10-CM

## 2018-07-19 DIAGNOSIS — Z86.79 H/O CARDIAC MURMUR: ICD-10-CM

## 2018-07-19 DIAGNOSIS — I44.7 LEFT BUNDLE BRANCH BLOCK: ICD-10-CM

## 2018-07-19 PROCEDURE — 70553 MRI BRAIN STEM W/O & W/DYE: CPT

## 2018-07-19 PROCEDURE — 6360000004 HC RX CONTRAST MEDICATION: Performed by: PSYCHIATRY & NEUROLOGY

## 2018-07-19 PROCEDURE — A9579 GAD-BASE MR CONTRAST NOS,1ML: HCPCS | Performed by: PSYCHIATRY & NEUROLOGY

## 2018-07-19 RX ADMIN — GADOTERIDOL 17 ML: 279.3 INJECTION, SOLUTION INTRAVENOUS at 14:17

## 2018-08-10 ENCOUNTER — HOSPITAL ENCOUNTER (OUTPATIENT)
Dept: NON INVASIVE DIAGNOSTICS | Age: 68
Discharge: HOME | End: 2018-08-10
Payer: MEDICARE

## 2018-08-10 ENCOUNTER — OFFICE VISIT (OUTPATIENT)
Dept: CARDIOLOGY | Age: 68
End: 2018-08-10
Payer: MEDICARE

## 2018-08-10 VITALS
DIASTOLIC BLOOD PRESSURE: 79 MMHG | SYSTOLIC BLOOD PRESSURE: 119 MMHG | OXYGEN SATURATION: 94 % | HEART RATE: 89 BPM | BODY MASS INDEX: 30.29 KG/M2 | WEIGHT: 193 LBS | RESPIRATION RATE: 20 BRPM | HEIGHT: 67 IN

## 2018-08-10 DIAGNOSIS — I50.32 CHRONIC DIASTOLIC CONGESTIVE HEART FAILURE (HCC): Primary | ICD-10-CM

## 2018-08-10 DIAGNOSIS — I47.1 PAROXYSMAL SUPRAVENTRICULAR TACHYCARDIA (HCC): ICD-10-CM

## 2018-08-10 DIAGNOSIS — I10 ESSENTIAL HYPERTENSION: ICD-10-CM

## 2018-08-10 DIAGNOSIS — I44.7 LEFT BUNDLE BRANCH BLOCK: ICD-10-CM

## 2018-08-10 DIAGNOSIS — R93.1 ABNORMAL ECHOCARDIOGRAM: ICD-10-CM

## 2018-08-10 DIAGNOSIS — I50.32 CHRONIC DIASTOLIC CONGESTIVE HEART FAILURE (HCC): ICD-10-CM

## 2018-08-10 PROCEDURE — 4040F PNEUMOC VAC/ADMIN/RCVD: CPT | Performed by: INTERNAL MEDICINE

## 2018-08-10 PROCEDURE — G8427 DOCREV CUR MEDS BY ELIG CLIN: HCPCS | Performed by: INTERNAL MEDICINE

## 2018-08-10 PROCEDURE — 1101F PT FALLS ASSESS-DOCD LE1/YR: CPT | Performed by: INTERNAL MEDICINE

## 2018-08-10 PROCEDURE — G8417 CALC BMI ABV UP PARAM F/U: HCPCS | Performed by: INTERNAL MEDICINE

## 2018-08-10 PROCEDURE — 1090F PRES/ABSN URINE INCON ASSESS: CPT | Performed by: INTERNAL MEDICINE

## 2018-08-10 PROCEDURE — 3017F COLORECTAL CA SCREEN DOC REV: CPT | Performed by: INTERNAL MEDICINE

## 2018-08-10 PROCEDURE — G8400 PT W/DXA NO RESULTS DOC: HCPCS | Performed by: INTERNAL MEDICINE

## 2018-08-10 PROCEDURE — G8598 ASA/ANTIPLAT THER USED: HCPCS | Performed by: INTERNAL MEDICINE

## 2018-08-10 PROCEDURE — 93225 XTRNL ECG REC<48 HRS REC: CPT

## 2018-08-10 PROCEDURE — 1036F TOBACCO NON-USER: CPT | Performed by: INTERNAL MEDICINE

## 2018-08-10 PROCEDURE — 1123F ACP DISCUSS/DSCN MKR DOCD: CPT | Performed by: INTERNAL MEDICINE

## 2018-08-10 PROCEDURE — 99214 OFFICE O/P EST MOD 30 MIN: CPT | Performed by: INTERNAL MEDICINE

## 2018-08-10 RX ORDER — FUROSEMIDE 20 MG/1
20 TABLET ORAL 2 TIMES DAILY
Qty: 60 TABLET | Refills: 3 | Status: SHIPPED | OUTPATIENT
Start: 2018-08-10 | End: 2018-12-28 | Stop reason: SDUPTHER

## 2018-08-10 RX ORDER — METOPROLOL SUCCINATE 25 MG/1
12.5 TABLET, EXTENDED RELEASE ORAL DAILY
Qty: 30 TABLET | Refills: 3 | Status: SHIPPED | OUTPATIENT
Start: 2018-08-10 | End: 2018-12-28 | Stop reason: SDUPTHER

## 2018-08-10 NOTE — PROGRESS NOTES
EMELYN Ledbetter am scribing for and in the presence of Dr. Joce Freitas:     [address] [facility] / HPI:    Chief Complaint   Patient presents with    Follow-up     HX: CHF, PSVT S/P Ablation, LBBB, HTN. Echo done on . Pt states she is doing fine. Lighteaded and dizziness on/off, SOB with exertion. C/o: [place]. Deneis: Cp, Palpitations. Dear  ELAINA Mireles - CNP    I had the pleasure of seeing Elizabeth Kent for follow up today. Filipe Ralph is 79 y.o. female with no prior cardiac history who presented today for follow-up. 1-Patient initially seen  In the emergency room on 10/25/2016 with palpitations, chest pain and near syncope. Admission ECG showed supraventricular tachycardia with HR of 210 bpm, converted to normal sinus rhythm with 6 mg of adenosine. She has baseline left bundle branch block on ECG. 2-Patient denies any prior history of CAD, myocardial infarction or heart failure. No prior history of cardiac catheterization. Her CT of the chest showed aortic and coronary artery calcification. 3-Stress test (done at [facility]) showed probably normal myocardial perfusion with no inducible ischemia. There was medium sized fixed perfusion defect at the septum, most likely artifact. Normal ejection fraction and wall motion. 4-Another visit to the emergency room on 2016 with wide complex tachycardia, treated with amiodarone and transferred to [facility] for further management. 5-Patient S/P ablation of left sided accessory pathway on 2017. 6-She had an echo on 2018. Phenotypic picture is suggestive of hypertrophic cardiomyopathy with septal hypertrophy, systolic anterior motion of the mitral valve without resting outflow tract obstruction. Moderate mitral regurgitation and mild diastolic dysfunction. She is here today for follow up and to discuss her most recent echo result.  Ms. Vlad Gibson she states that she is doing really well overall. Infrequent short episodes of dizziness and lightheadedness however she denies any falls or near falls. No chest pain or worsening shortness of breath. No significant change in weight. No problems with medications. Past Medical History:    Past Medical History:   Diagnosis Date    Allergic rhinitis     Facial weakness ~ 2002    traumatic injury    H/O cardiovascular stress test 11/28/2016    Probably normal myocardial perfusion SPECT imagin. There is no evidence of ischemia. There is a fixed defect in septum of uncertain etiology but potentially due to LBBB, attenuation artifact or less likely prior infarct. Normal wall motion and wall thickening.  H/O echocardiogram 11/04/2016    H/O echocardiogram 07/11/2018    Phenotypic picture of hypertrophic cardiomyopathy with septal hyertrophy and systolic antreior motion of the mitral valve but without signifcant resting outflow tract obstruction. EF >70%. The LV wall thickness is mildly increased. Systolic anterior motion (TOBY) of the mitral valve with mild to moderate mitral regurg. Mild tricuspid regurg. Evidence of mild (grade I) diastolic dysfunction is seen.  Hypertension 2011    Psoriasis     S/P ablation of atrial flutter 02/21/2017    Dr. Guy Dahl    Trochanteric bursitis 2013    right   paroxysmal supraventricular tachycardia  left bundle branch block  coronary artery calcification on chest CT. Past Surgical History:  Past Surgical History:   Procedure Laterality Date    ANKLE FRACTURE SURGERY Right 02/25/2017    ANKLE FRACTURE SURGERY Right 2/25/2017    ANKLE OPEN REDUCTION INTERNAL FIXATION-PARAGON.   APPLCATION OF A SPLINT performed by Lucho Fox DPM at [address] Right 02/25/2017    ORIF    APPENDECTOMY  1970'S    HERNIA REPAIR      ventral    HYSTERECTOMY  1978    STILL HAS OVARIES    TOE SURGERY  10 - 15 years ago    5th left toe    UMBILICAL HERNIA REPAIR  03/12     Social History: History   Smoking Status    Former Smoker    Packs/day: 0.50    Years: 40.00    Types: Cigarettes    Quit date: 10/25/2016   Smokeless Tobacco    Never Used     Current Medications:  Outpatient Prescriptions Marked as Taking for the 8/10/18 encounter (Office Visit) with Eligio Sorensen MD   Medication Sig Dispense Refill    furosemide (LASIX) 20 MG tablet Take 1 tablet by mouth 2 times daily 60 tablet 3    metoprolol succinate (TOPROL XL) 25 MG extended release tablet Take 0.5 tablets by mouth daily 30 tablet 3    aspirin 81 MG tablet Take 81 mg by mouth daily      baclofen (LIORESAL) 10 MG tablet Take 1 tablet by mouth 3 times daily 30 tablet 0    loratadine (CLARITIN) 10 MG capsule Take 10 mg by mouth daily      diltiazem (DILACOR XR) 240 MG extended release capsule Take 240 mg by mouth daily      atorvastatin (LIPITOR) 20 MG tablet Take 1 tablet by mouth nightly      Multiple Vitamin (MULTIVITAMIN PO) Take  by mouth.  ibuprofen (ADVIL;MOTRIN) 200 MG CAPS Take 1 capsule by mouth as needed. REVIEW OF SYSTEMS:    CONSTITUTIONAL: No major weight gain or loss. HEENT: No new vision difficulties or ringing in the ears. RESPIRATORY: See HPI  CARDIOVASCULAR: See HPI  GI: No nausea, vomiting, diarrhea, constipation, abdominal pain or changes in bowel habits. : No urinary frequency, urgency, incontinence hematuria or dysuria. SKIN: No cyanosis or skin lesions. MUSCULOSKELETAL: No new muscle or joint pain. NEUROLOGICAL: No syncope or TIA-like symptoms. PSYCHIATRIC: No anxiety, pain, insomnia or depression    Objective:     PHYSICAL EXAM:      VITALS:    /79 (Site: Left Arm, Position: Sitting, Cuff Size: Large Adult)   Pulse 89   Resp 20   Ht 5' 7\" (1.702 m)   Wt 193 lb (87.5 kg)   LMP 03/19/1992   SpO2 94%   BMI 30.23 kg/m²   CONSTITUTIONAL: Cooperative, no apparent distress, and appears well nourished / developed.   NEUROLOGIC:  Awake and orientated to person, place and

## 2018-08-14 DIAGNOSIS — R94.31 HOLTER MONITOR, ABNORMAL: Primary | ICD-10-CM

## 2018-08-15 ENCOUNTER — TELEPHONE (OUTPATIENT)
Dept: CARDIOLOGY CLINIC | Age: 68
End: 2018-08-15

## 2018-09-05 ENCOUNTER — OFFICE VISIT (OUTPATIENT)
Dept: CARDIOLOGY CLINIC | Age: 68
End: 2018-09-05
Payer: MEDICARE

## 2018-09-05 VITALS
BODY MASS INDEX: 30.13 KG/M2 | HEIGHT: 67 IN | SYSTOLIC BLOOD PRESSURE: 112 MMHG | WEIGHT: 192 LBS | DIASTOLIC BLOOD PRESSURE: 82 MMHG | HEART RATE: 104 BPM

## 2018-09-05 DIAGNOSIS — I47.1 PAROXYSMAL SUPRAVENTRICULAR TACHYCARDIA (HCC): Primary | ICD-10-CM

## 2018-09-05 PROCEDURE — 1036F TOBACCO NON-USER: CPT | Performed by: INTERNAL MEDICINE

## 2018-09-05 PROCEDURE — G8400 PT W/DXA NO RESULTS DOC: HCPCS | Performed by: INTERNAL MEDICINE

## 2018-09-05 PROCEDURE — 93000 ELECTROCARDIOGRAM COMPLETE: CPT | Performed by: INTERNAL MEDICINE

## 2018-09-05 PROCEDURE — 1123F ACP DISCUSS/DSCN MKR DOCD: CPT | Performed by: INTERNAL MEDICINE

## 2018-09-05 PROCEDURE — G8427 DOCREV CUR MEDS BY ELIG CLIN: HCPCS | Performed by: INTERNAL MEDICINE

## 2018-09-05 PROCEDURE — 99205 OFFICE O/P NEW HI 60 MIN: CPT | Performed by: INTERNAL MEDICINE

## 2018-09-05 PROCEDURE — 4040F PNEUMOC VAC/ADMIN/RCVD: CPT | Performed by: INTERNAL MEDICINE

## 2018-09-05 PROCEDURE — 1101F PT FALLS ASSESS-DOCD LE1/YR: CPT | Performed by: INTERNAL MEDICINE

## 2018-09-05 PROCEDURE — G8417 CALC BMI ABV UP PARAM F/U: HCPCS | Performed by: INTERNAL MEDICINE

## 2018-09-05 PROCEDURE — 3017F COLORECTAL CA SCREEN DOC REV: CPT | Performed by: INTERNAL MEDICINE

## 2018-09-05 PROCEDURE — 1090F PRES/ABSN URINE INCON ASSESS: CPT | Performed by: INTERNAL MEDICINE

## 2018-09-05 PROCEDURE — G8598 ASA/ANTIPLAT THER USED: HCPCS | Performed by: INTERNAL MEDICINE

## 2018-09-05 RX ORDER — ASPIRIN/CALCIUM/MAG/ALUMINUM 325 MG
325 TABLET ORAL DAILY
COMMUNITY
End: 2018-12-28 | Stop reason: SDUPTHER

## 2018-09-05 ASSESSMENT — ENCOUNTER SYMPTOMS
DIARRHEA: 0
DOUBLE VISION: 0
BLURRED VISION: 0
NAUSEA: 0
CONSTIPATION: 0
ABDOMINAL PAIN: 0
LEFT EYE: 0
BACK PAIN: 0
SHORTNESS OF BREATH: 0
COUGH: 0
RIGHT EYE: 0
SORE THROAT: 0
WHEEZING: 0
VOMITING: 0

## 2018-09-05 NOTE — PROGRESS NOTES
mood appears not anxious. Her affect is not angry. Cognition and memory are normal. She does not exhibit a depressed mood. Nursing note and vitals reviewed. DIAGNOSTIC STUDIES & LABORATORY DATA:    I have personally reviewed and interpreted the results of the following diagnostic testing  CARDIAC HISTORY:    ECHO: 2018  Summary  Phenotypic picture of hypertrophic cardiomyopathy with septal hypertrophy  and systolic anterior motion of the mitral valve but without significant  resting outflow tract obstruction. Global left ventricular systolic function appears hyperdynamic with an  estimated ejection fraction of >70%. The left ventricular cavity size is within normal limits and the left  ventricular wall thickness is mildly increased. Systolic anterior motion (TOBY) of the mitral valve with mild to moderate  mitral regurgitation. Mild tricuspid regurgitation. Evidence of mild (grade I) diastolic dysfunction is seen. No prior study available for comparison. EC2018   Sinus tachycardia, LBBB,  bpm    HOLTER MONITOR: 08/15/2018          Lab Results   Component Value Date    WBC 8.9 2018    HGB 13.2 2018    HCT 40.2 2018     2018    CHOL 174 2018    TRIG 334 (H) 2018    HDL 39 (L) 2018    ALT 21 2018    AST 20 2018     2018    K 4.6 2018     2018    CREATININE 1.20 (H) 2018    BUN 22 2018    CO2 29 2018    TSH 5.46 (H) 2018    INR 1.0 2017    LABA1C 6.5 (H) 2017          IMPRESSION:  The patient is a 78 y/o female that has hypertrophic cardiomyopathy and h/o SVT. She is s/p ablation of a left lateral AP. The patient has had intermittent dizziness since the procedure and one episode of syncope. She wore a Holter monitor and it captured an episode of high grade AVB. The patient was awake and symptomatic when the episode occurred.   I explained to the patient she

## 2018-09-10 ENCOUNTER — HOSPITAL ENCOUNTER (OUTPATIENT)
Age: 68
Discharge: HOME | End: 2018-09-10
Payer: MEDICARE

## 2018-09-10 DIAGNOSIS — I47.1 PAROXYSMAL SUPRAVENTRICULAR TACHYCARDIA (HCC): ICD-10-CM

## 2018-09-10 LAB
ABSOLUTE EOS #: 0.29 K/UL (ref 0–0.44)
ABSOLUTE IMMATURE GRANULOCYTE: 0.03 K/UL (ref 0–0.3)
ABSOLUTE LYMPH #: 1.19 K/UL (ref 1.1–3.7)
ABSOLUTE MONO #: 0.72 K/UL (ref 0.1–1.2)
ANION GAP SERPL CALCULATED.3IONS-SCNC: 12 MMOL/L (ref 9–17)
BASOPHILS # BLD: 1 % (ref 0–2)
BASOPHILS ABSOLUTE: 0.07 K/UL (ref 0–0.2)
BUN BLDV-MCNC: 13 MG/DL (ref 8–23)
BUN/CREAT BLD: 15 (ref 9–20)
CALCIUM SERPL-MCNC: 9.4 MG/DL (ref 8.6–10.4)
CHLORIDE BLD-SCNC: 100 MMOL/L (ref 98–107)
CO2: 27 MMOL/L (ref 20–31)
CREAT SERPL-MCNC: 0.85 MG/DL (ref 0.5–0.9)
DIFFERENTIAL TYPE: ABNORMAL
EOSINOPHILS RELATIVE PERCENT: 4 % (ref 1–4)
GFR AFRICAN AMERICAN: >60 ML/MIN
GFR NON-AFRICAN AMERICAN: >60 ML/MIN
GFR SERPL CREATININE-BSD FRML MDRD: ABNORMAL ML/MIN/{1.73_M2}
GFR SERPL CREATININE-BSD FRML MDRD: ABNORMAL ML/MIN/{1.73_M2}
GLUCOSE BLD-MCNC: 116 MG/DL (ref 70–99)
HCT VFR BLD CALC: 41.1 % (ref 36.3–47.1)
HEMOGLOBIN: 13.3 G/DL (ref 11.9–15.1)
IMMATURE GRANULOCYTES: 0 %
LYMPHOCYTES # BLD: 15 % (ref 24–43)
MCH RBC QN AUTO: 31.2 PG (ref 25.2–33.5)
MCHC RBC AUTO-ENTMCNC: 32.4 G/DL (ref 28.4–34.8)
MCV RBC AUTO: 96.5 FL (ref 82.6–102.9)
MONOCYTES # BLD: 9 % (ref 3–12)
NRBC AUTOMATED: 0 PER 100 WBC
PDW BLD-RTO: 13.4 % (ref 11.8–14.4)
PLATELET # BLD: 254 K/UL (ref 138–453)
PLATELET ESTIMATE: ABNORMAL
PMV BLD AUTO: 10.3 FL (ref 8.1–13.5)
POTASSIUM SERPL-SCNC: 4.7 MMOL/L (ref 3.7–5.3)
RBC # BLD: 4.26 M/UL (ref 3.95–5.11)
RBC # BLD: ABNORMAL 10*6/UL
SEG NEUTROPHILS: 71 % (ref 36–65)
SEGMENTED NEUTROPHILS ABSOLUTE COUNT: 5.84 K/UL (ref 1.5–8.1)
SODIUM BLD-SCNC: 139 MMOL/L (ref 135–144)
WBC # BLD: 8.1 K/UL (ref 3.5–11.3)
WBC # BLD: ABNORMAL 10*3/UL

## 2018-09-10 PROCEDURE — 36415 COLL VENOUS BLD VENIPUNCTURE: CPT

## 2018-09-10 PROCEDURE — 80048 BASIC METABOLIC PNL TOTAL CA: CPT

## 2018-09-10 PROCEDURE — 85025 COMPLETE CBC W/AUTO DIFF WBC: CPT

## 2018-09-13 ENCOUNTER — PREP FOR PROCEDURE (OUTPATIENT)
Dept: CARDIOLOGY | Age: 68
End: 2018-09-13

## 2018-09-13 RX ORDER — SODIUM CHLORIDE 9 MG/ML
INJECTION, SOLUTION INTRAVENOUS CONTINUOUS
Status: CANCELLED | OUTPATIENT
Start: 2018-09-13 | End: 2019-09-13

## 2018-09-13 RX ORDER — CHLORHEXIDINE GLUCONATE 4 G/100ML
SOLUTION TOPICAL ONCE
Status: CANCELLED | OUTPATIENT
Start: 2018-09-13 | End: 2018-09-13

## 2018-09-13 RX ORDER — SODIUM CHLORIDE 0.9 % (FLUSH) 0.9 %
10 SYRINGE (ML) INJECTION EVERY 12 HOURS SCHEDULED
Status: CANCELLED | OUTPATIENT
Start: 2018-09-13 | End: 2019-09-13

## 2018-09-13 RX ORDER — SODIUM CHLORIDE 0.9 % (FLUSH) 0.9 %
10 SYRINGE (ML) INJECTION PRN
Status: CANCELLED | OUTPATIENT
Start: 2018-09-13 | End: 2019-09-13

## 2018-09-14 ENCOUNTER — ANESTHESIA (OUTPATIENT)
Dept: CARDIAC CATH/INVASIVE PROCEDURES | Age: 68
End: 2018-09-14
Payer: MEDICARE

## 2018-09-14 ENCOUNTER — APPOINTMENT (OUTPATIENT)
Dept: CARDIAC CATH/INVASIVE PROCEDURES | Age: 68
End: 2018-09-14
Attending: INTERNAL MEDICINE
Payer: MEDICARE

## 2018-09-14 ENCOUNTER — APPOINTMENT (OUTPATIENT)
Dept: GENERAL RADIOLOGY | Age: 68
End: 2018-09-14
Attending: INTERNAL MEDICINE
Payer: MEDICARE

## 2018-09-14 ENCOUNTER — ANESTHESIA EVENT (OUTPATIENT)
Dept: CARDIAC CATH/INVASIVE PROCEDURES | Age: 68
End: 2018-09-14
Payer: MEDICARE

## 2018-09-14 ENCOUNTER — HOSPITAL ENCOUNTER (OUTPATIENT)
Dept: INPATIENT UNIT | Age: 68
Discharge: HOME | End: 2018-09-15
Attending: INTERNAL MEDICINE | Admitting: INTERNAL MEDICINE
Payer: MEDICARE

## 2018-09-14 VITALS — OXYGEN SATURATION: 98 % | DIASTOLIC BLOOD PRESSURE: 66 MMHG | TEMPERATURE: 96.8 F | SYSTOLIC BLOOD PRESSURE: 132 MMHG

## 2018-09-14 PROBLEM — I47.10 SVT (SUPRAVENTRICULAR TACHYCARDIA): Status: ACTIVE | Noted: 2018-09-14

## 2018-09-14 PROBLEM — I47.1 SVT (SUPRAVENTRICULAR TACHYCARDIA) (HCC): Status: ACTIVE | Noted: 2018-09-14

## 2018-09-14 LAB
ABO: NORMAL
ANTIBODY SCREEN: NORMAL
APTT: 32.8 SECONDS (ref 22–38)
BASOPHILS # BLD: 1.2 %
BASOPHILS ABSOLUTE: 0.1 THOU/MM3 (ref 0–0.1)
EKG ATRIAL RATE: 87 BPM
EKG P AXIS: 64 DEGREES
EKG P-R INTERVAL: 162 MS
EKG Q-T INTERVAL: 422 MS
EKG QRS DURATION: 138 MS
EKG QTC CALCULATION (BAZETT): 507 MS
EKG R AXIS: -10 DEGREES
EKG T AXIS: 122 DEGREES
EKG VENTRICULAR RATE: 87 BPM
EOSINOPHIL # BLD: 3.7 %
EOSINOPHILS ABSOLUTE: 0.3 THOU/MM3 (ref 0–0.4)
ERYTHROCYTE [DISTWIDTH] IN BLOOD BY AUTOMATED COUNT: 13.5 % (ref 11.5–14.5)
ERYTHROCYTE [DISTWIDTH] IN BLOOD BY AUTOMATED COUNT: 46.3 FL (ref 35–45)
GLUCOSE BLD-MCNC: 113 MG/DL (ref 70–108)
GLUCOSE BLD-MCNC: 170 MG/DL (ref 70–108)
HCT VFR BLD CALC: 43.7 % (ref 37–47)
HEMOGLOBIN: 14.4 GM/DL (ref 12–16)
IMMATURE GRANS (ABS): 0.03 THOU/MM3 (ref 0–0.07)
IMMATURE GRANULOCYTES: 0.3 %
INR BLD: 0.97 (ref 0.85–1.13)
LYMPHOCYTES # BLD: 14.8 %
LYMPHOCYTES ABSOLUTE: 1.4 THOU/MM3 (ref 1–4.8)
MCH RBC QN AUTO: 31.1 PG (ref 26–33)
MCHC RBC AUTO-ENTMCNC: 33 GM/DL (ref 32.2–35.5)
MCV RBC AUTO: 94.4 FL (ref 81–99)
MONOCYTES # BLD: 9.7 %
MONOCYTES ABSOLUTE: 0.9 THOU/MM3 (ref 0.4–1.3)
NUCLEATED RED BLOOD CELLS: 0 /100 WBC
PLATELET # BLD: 291 THOU/MM3 (ref 130–400)
PMV BLD AUTO: 10.5 FL (ref 9.4–12.4)
RBC # BLD: 4.63 MILL/MM3 (ref 4.2–5.4)
RH FACTOR: NORMAL
SEG NEUTROPHILS: 70.3 %
SEGMENTED NEUTROPHILS ABSOLUTE COUNT: 6.5 THOU/MM3 (ref 1.8–7.7)
WBC # BLD: 9.3 THOU/MM3 (ref 4.8–10.8)

## 2018-09-14 PROCEDURE — 85610 PROTHROMBIN TIME: CPT

## 2018-09-14 PROCEDURE — 2580000003 HC RX 258: Performed by: INTERNAL MEDICINE

## 2018-09-14 PROCEDURE — 7100000000 HC PACU RECOVERY - FIRST 15 MIN

## 2018-09-14 PROCEDURE — 2709999900 HC NON-CHARGEABLE SUPPLY

## 2018-09-14 PROCEDURE — 86901 BLOOD TYPING SEROLOGIC RH(D): CPT

## 2018-09-14 PROCEDURE — 6360000002 HC RX W HCPCS: Performed by: PHYSICIAN ASSISTANT

## 2018-09-14 PROCEDURE — C1898 LEAD, PMKR, OTHER THAN TRANS: HCPCS

## 2018-09-14 PROCEDURE — A4565 SLINGS: HCPCS

## 2018-09-14 PROCEDURE — 93010 ELECTROCARDIOGRAM REPORT: CPT | Performed by: INTERNAL MEDICINE

## 2018-09-14 PROCEDURE — 6370000000 HC RX 637 (ALT 250 FOR IP): Performed by: INTERNAL MEDICINE

## 2018-09-14 PROCEDURE — 36415 COLL VENOUS BLD VENIPUNCTURE: CPT

## 2018-09-14 PROCEDURE — 2500000003 HC RX 250 WO HCPCS: Performed by: NURSE ANESTHETIST, CERTIFIED REGISTERED

## 2018-09-14 PROCEDURE — 6360000002 HC RX W HCPCS: Performed by: INTERNAL MEDICINE

## 2018-09-14 PROCEDURE — 33208 INSRT HEART PM ATRIAL & VENT: CPT

## 2018-09-14 PROCEDURE — 6360000002 HC RX W HCPCS: Performed by: ANESTHESIOLOGY

## 2018-09-14 PROCEDURE — 85025 COMPLETE CBC W/AUTO DIFF WBC: CPT

## 2018-09-14 PROCEDURE — 6360000002 HC RX W HCPCS: Performed by: NURSE ANESTHETIST, CERTIFIED REGISTERED

## 2018-09-14 PROCEDURE — 85730 THROMBOPLASTIN TIME PARTIAL: CPT

## 2018-09-14 PROCEDURE — 3700000001 HC ADD 15 MINUTES (ANESTHESIA)

## 2018-09-14 PROCEDURE — 33208 INSRT HEART PM ATRIAL & VENT: CPT | Performed by: INTERNAL MEDICINE

## 2018-09-14 PROCEDURE — C1894 INTRO/SHEATH, NON-LASER: HCPCS

## 2018-09-14 PROCEDURE — 2580000003 HC RX 258: Performed by: PHYSICIAN ASSISTANT

## 2018-09-14 PROCEDURE — C1785 PMKR, DUAL, RATE-RESP: HCPCS

## 2018-09-14 PROCEDURE — 7100000001 HC PACU RECOVERY - ADDTL 15 MIN

## 2018-09-14 PROCEDURE — 2500000003 HC RX 250 WO HCPCS: Performed by: INTERNAL MEDICINE

## 2018-09-14 PROCEDURE — 82948 REAGENT STRIP/BLOOD GLUCOSE: CPT

## 2018-09-14 PROCEDURE — 86850 RBC ANTIBODY SCREEN: CPT

## 2018-09-14 PROCEDURE — 93005 ELECTROCARDIOGRAM TRACING: CPT | Performed by: PHYSICIAN ASSISTANT

## 2018-09-14 PROCEDURE — 3700000000 HC ANESTHESIA ATTENDED CARE

## 2018-09-14 PROCEDURE — 71045 X-RAY EXAM CHEST 1 VIEW: CPT

## 2018-09-14 PROCEDURE — 86900 BLOOD TYPING SEROLOGIC ABO: CPT

## 2018-09-14 RX ORDER — SODIUM CHLORIDE 9 MG/ML
INJECTION, SOLUTION INTRAVENOUS CONTINUOUS
Status: DISCONTINUED | OUTPATIENT
Start: 2018-09-14 | End: 2018-09-14

## 2018-09-14 RX ORDER — FUROSEMIDE 20 MG/1
20 TABLET ORAL 2 TIMES DAILY
Status: DISCONTINUED | OUTPATIENT
Start: 2018-09-14 | End: 2018-09-15 | Stop reason: HOSPADM

## 2018-09-14 RX ORDER — NICOTINE POLACRILEX 4 MG
15 LOZENGE BUCCAL PRN
Status: DISCONTINUED | OUTPATIENT
Start: 2018-09-14 | End: 2018-09-15 | Stop reason: HOSPADM

## 2018-09-14 RX ORDER — LIDOCAINE HYDROCHLORIDE 20 MG/ML
INJECTION, SOLUTION INFILTRATION; PERINEURAL PRN
Status: DISCONTINUED | OUTPATIENT
Start: 2018-09-14 | End: 2018-09-14 | Stop reason: SDUPTHER

## 2018-09-14 RX ORDER — HYDROCODONE BITARTRATE AND ACETAMINOPHEN 5; 325 MG/1; MG/1
1 TABLET ORAL EVERY 4 HOURS PRN
Status: DISCONTINUED | OUTPATIENT
Start: 2018-09-14 | End: 2018-09-15 | Stop reason: HOSPADM

## 2018-09-14 RX ORDER — ACETAMINOPHEN 325 MG/1
650 TABLET ORAL EVERY 4 HOURS PRN
Status: DISCONTINUED | OUTPATIENT
Start: 2018-09-14 | End: 2018-09-15 | Stop reason: HOSPADM

## 2018-09-14 RX ORDER — FENTANYL CITRATE 50 UG/ML
INJECTION, SOLUTION INTRAMUSCULAR; INTRAVENOUS PRN
Status: DISCONTINUED | OUTPATIENT
Start: 2018-09-14 | End: 2018-09-14 | Stop reason: SDUPTHER

## 2018-09-14 RX ORDER — HYDROCODONE BITARTRATE AND ACETAMINOPHEN 5; 325 MG/1; MG/1
2 TABLET ORAL EVERY 4 HOURS PRN
Status: DISCONTINUED | OUTPATIENT
Start: 2018-09-14 | End: 2018-09-15 | Stop reason: HOSPADM

## 2018-09-14 RX ORDER — PROPOFOL 10 MG/ML
INJECTION, EMULSION INTRAVENOUS PRN
Status: DISCONTINUED | OUTPATIENT
Start: 2018-09-14 | End: 2018-09-14 | Stop reason: SDUPTHER

## 2018-09-14 RX ORDER — SODIUM CHLORIDE 0.9 % (FLUSH) 0.9 %
10 SYRINGE (ML) INJECTION EVERY 12 HOURS SCHEDULED
Status: DISCONTINUED | OUTPATIENT
Start: 2018-09-14 | End: 2018-09-14

## 2018-09-14 RX ORDER — DILTIAZEM HYDROCHLORIDE 240 MG/1
240 CAPSULE, COATED, EXTENDED RELEASE ORAL DAILY
Status: DISCONTINUED | OUTPATIENT
Start: 2018-09-15 | End: 2018-09-15 | Stop reason: HOSPADM

## 2018-09-14 RX ORDER — SODIUM CHLORIDE 0.9 % (FLUSH) 0.9 %
10 SYRINGE (ML) INJECTION PRN
Status: DISCONTINUED | OUTPATIENT
Start: 2018-09-14 | End: 2018-09-14

## 2018-09-14 RX ORDER — PROMETHAZINE HYDROCHLORIDE 25 MG/ML
12.5 INJECTION, SOLUTION INTRAMUSCULAR; INTRAVENOUS
Status: DISCONTINUED | OUTPATIENT
Start: 2018-09-14 | End: 2018-09-14

## 2018-09-14 RX ORDER — METOPROLOL SUCCINATE 25 MG/1
12.5 TABLET, EXTENDED RELEASE ORAL DAILY
Status: DISCONTINUED | OUTPATIENT
Start: 2018-09-15 | End: 2018-09-15 | Stop reason: HOSPADM

## 2018-09-14 RX ORDER — MIDAZOLAM HYDROCHLORIDE 1 MG/ML
INJECTION INTRAMUSCULAR; INTRAVENOUS PRN
Status: DISCONTINUED | OUTPATIENT
Start: 2018-09-14 | End: 2018-09-14 | Stop reason: SDUPTHER

## 2018-09-14 RX ORDER — ONDANSETRON 2 MG/ML
4 INJECTION INTRAMUSCULAR; INTRAVENOUS EVERY 6 HOURS PRN
Status: DISCONTINUED | OUTPATIENT
Start: 2018-09-14 | End: 2018-09-15 | Stop reason: HOSPADM

## 2018-09-14 RX ORDER — CHLORHEXIDINE GLUCONATE 4 G/100ML
SOLUTION TOPICAL ONCE
Status: DISCONTINUED | OUTPATIENT
Start: 2018-09-14 | End: 2018-09-14

## 2018-09-14 RX ORDER — FENTANYL CITRATE 50 UG/ML
25 INJECTION, SOLUTION INTRAMUSCULAR; INTRAVENOUS EVERY 5 MIN PRN
Status: DISCONTINUED | OUTPATIENT
Start: 2018-09-14 | End: 2018-09-14

## 2018-09-14 RX ORDER — LABETALOL HYDROCHLORIDE 5 MG/ML
10 INJECTION, SOLUTION INTRAVENOUS EVERY 10 MIN PRN
Status: DISCONTINUED | OUTPATIENT
Start: 2018-09-14 | End: 2018-09-14

## 2018-09-14 RX ORDER — FENTANYL CITRATE 50 UG/ML
50 INJECTION, SOLUTION INTRAMUSCULAR; INTRAVENOUS EVERY 5 MIN PRN
Status: DISCONTINUED | OUTPATIENT
Start: 2018-09-14 | End: 2018-09-14

## 2018-09-14 RX ORDER — CETIRIZINE HYDROCHLORIDE 10 MG/1
5 TABLET ORAL DAILY
Status: DISCONTINUED | OUTPATIENT
Start: 2018-09-15 | End: 2018-09-15 | Stop reason: HOSPADM

## 2018-09-14 RX ORDER — DEXTROSE MONOHYDRATE 25 G/50ML
12.5 INJECTION, SOLUTION INTRAVENOUS PRN
Status: DISCONTINUED | OUTPATIENT
Start: 2018-09-14 | End: 2018-09-15 | Stop reason: HOSPADM

## 2018-09-14 RX ORDER — DEXTROSE MONOHYDRATE 50 MG/ML
100 INJECTION, SOLUTION INTRAVENOUS PRN
Status: DISCONTINUED | OUTPATIENT
Start: 2018-09-14 | End: 2018-09-15 | Stop reason: HOSPADM

## 2018-09-14 RX ADMIN — FENTANYL CITRATE 25 MCG: 50 INJECTION INTRAMUSCULAR; INTRAVENOUS at 15:41

## 2018-09-14 RX ADMIN — FENTANYL CITRATE 25 MCG: 50 INJECTION INTRAMUSCULAR; INTRAVENOUS at 13:56

## 2018-09-14 RX ADMIN — Medication 2 G: at 13:55

## 2018-09-14 RX ADMIN — SODIUM CHLORIDE: 9 INJECTION, SOLUTION INTRAVENOUS at 11:38

## 2018-09-14 RX ADMIN — FUROSEMIDE 20 MG: 20 TABLET ORAL at 21:55

## 2018-09-14 RX ADMIN — FENTANYL CITRATE 25 MCG: 50 INJECTION INTRAMUSCULAR; INTRAVENOUS at 13:53

## 2018-09-14 RX ADMIN — HYDROCODONE BITARTRATE AND ACETAMINOPHEN 2 TABLET: 5; 325 TABLET ORAL at 21:02

## 2018-09-14 RX ADMIN — FENTANYL CITRATE 25 MCG: 50 INJECTION INTRAMUSCULAR; INTRAVENOUS at 14:30

## 2018-09-14 RX ADMIN — LIDOCAINE HYDROCHLORIDE 100 MG: 20 INJECTION, SOLUTION INFILTRATION; PERINEURAL at 13:53

## 2018-09-14 RX ADMIN — SODIUM CHLORIDE 50000 UNITS: 900 IRRIGANT IRRIGATION at 13:45

## 2018-09-14 RX ADMIN — INSULIN LISPRO 1 UNITS: 100 INJECTION, SOLUTION INTRAVENOUS; SUBCUTANEOUS at 21:56

## 2018-09-14 RX ADMIN — PROPOFOL 150 MG: 10 INJECTION, EMULSION INTRAVENOUS at 13:53

## 2018-09-14 RX ADMIN — MIDAZOLAM HYDROCHLORIDE 2 MG: 1 INJECTION, SOLUTION INTRAMUSCULAR; INTRAVENOUS at 13:46

## 2018-09-14 RX ADMIN — PHENYLEPHRINE HYDROCHLORIDE 100 MCG: 10 INJECTION INTRAVENOUS at 14:02

## 2018-09-14 RX ADMIN — FENTANYL CITRATE 25 MCG: 50 INJECTION INTRAMUSCULAR; INTRAVENOUS at 15:08

## 2018-09-14 RX ADMIN — FENTANYL CITRATE 25 MCG: 50 INJECTION INTRAMUSCULAR; INTRAVENOUS at 15:36

## 2018-09-14 RX ADMIN — Medication 2 G: at 13:44

## 2018-09-14 RX ADMIN — PHENYLEPHRINE HYDROCHLORIDE 100 MCG: 10 INJECTION INTRAVENOUS at 13:58

## 2018-09-14 RX ADMIN — PHENYLEPHRINE HYDROCHLORIDE 100 MCG: 10 INJECTION INTRAVENOUS at 14:24

## 2018-09-14 RX ADMIN — CEFAZOLIN SODIUM 2 G: 10 INJECTION, POWDER, FOR SOLUTION INTRAVENOUS at 21:57

## 2018-09-14 ASSESSMENT — PULMONARY FUNCTION TESTS
PIF_VALUE: 3
PIF_VALUE: 2
PIF_VALUE: 2
PIF_VALUE: 1
PIF_VALUE: 2
PIF_VALUE: 2
PIF_VALUE: 3
PIF_VALUE: 2
PIF_VALUE: 1
PIF_VALUE: 2
PIF_VALUE: 2
PIF_VALUE: 3
PIF_VALUE: 4
PIF_VALUE: 2
PIF_VALUE: 0
PIF_VALUE: 3
PIF_VALUE: 2
PIF_VALUE: 3
PIF_VALUE: 5
PIF_VALUE: 4
PIF_VALUE: 3
PIF_VALUE: 3
PIF_VALUE: 2
PIF_VALUE: 3
PIF_VALUE: 0
PIF_VALUE: 2
PIF_VALUE: 3
PIF_VALUE: 2
PIF_VALUE: 3
PIF_VALUE: 1
PIF_VALUE: 3
PIF_VALUE: 4
PIF_VALUE: 3
PIF_VALUE: 2
PIF_VALUE: 3
PIF_VALUE: 3
PIF_VALUE: 2
PIF_VALUE: 2
PIF_VALUE: 3
PIF_VALUE: 2
PIF_VALUE: 12
PIF_VALUE: 3
PIF_VALUE: 4
PIF_VALUE: 3
PIF_VALUE: 4
PIF_VALUE: 3
PIF_VALUE: 2
PIF_VALUE: 3
PIF_VALUE: 3
PIF_VALUE: 1
PIF_VALUE: 2
PIF_VALUE: 3
PIF_VALUE: 2
PIF_VALUE: 2
PIF_VALUE: 3
PIF_VALUE: 4
PIF_VALUE: 3
PIF_VALUE: 3
PIF_VALUE: 2
PIF_VALUE: 3
PIF_VALUE: 2
PIF_VALUE: 3
PIF_VALUE: 2

## 2018-09-14 ASSESSMENT — PAIN SCALES - GENERAL
PAINLEVEL_OUTOF10: 6
PAINLEVEL_OUTOF10: 7
PAINLEVEL_OUTOF10: 0
PAINLEVEL_OUTOF10: 6

## 2018-09-14 NOTE — H&P
deficit. Coordination and gait normal.   Skin: Skin is warm and dry. No lesion and no rash noted. She is not diaphoretic. No cyanosis. Nails show no clubbing. Psychiatric: Her speech is normal and behavior is normal. Judgment and thought content normal. Her mood appears not anxious. Her affect is not angry. Cognition and memory are normal. She does not exhibit a depressed mood. Nursing note and vitals reviewed.        DIAGNOSTIC STUDIES & LABORATORY DATA:     I have personally reviewed and interpreted the results of the following diagnostic testing  CARDIAC HISTORY:     ECHO: 2018  Summary  Phenotypic picture of hypertrophic cardiomyopathy with septal hypertrophy  and systolic anterior motion of the mitral valve but without significant  resting outflow tract obstruction. Global left ventricular systolic function appears hyperdynamic with an  estimated ejection fraction of >70%. The left ventricular cavity size is within normal limits and the left  ventricular wall thickness is mildly increased. Systolic anterior motion (TOBY) of the mitral valve with mild to moderate  mitral regurgitation. Mild tricuspid regurgitation. Evidence of mild (grade I) diastolic dysfunction is seen. No prior study available for comparison. EC2018   Sinus tachycardia, LBBB,  bpm     HOLTER MONITOR: 08/15/2018                  Lab Results   Component Value Date     WBC 8.9 2018     HGB 13.2 2018     HCT 40.2 2018      2018     CHOL 174 2018     TRIG 334 (H) 2018     HDL 39 (L) 2018     ALT 21 2018     AST 20 2018      2018     K 4.6 2018      2018     CREATININE 1.20 (H) 2018     BUN 22 2018     CO2 29 2018     TSH 5.46 (H) 2018     INR 1.0 2017     LABA1C 6.5 (H) 2017         IMPRESSION:  The patient is a 80 y/o female that has hypertrophic cardiomyopathy and h/o SVT.   She is s/p

## 2018-09-14 NOTE — ANESTHESIA PRE PROCEDURE
Department of Anesthesiology  Preprocedure Note       Name:  Addis Chung   Age:  79 y.o.  :  1950                                          MRN:  [id number]         Date:  2018      Surgeon: * No surgeons listed *    Procedure: * No procedures listed *    Medications prior to admission:   Prior to Admission medications    Medication Sig Start Date End Date Taking? Authorizing Provider   metFORMIN (GLUCOPHAGE) 500 MG tablet Take 500 mg by mouth 2 times daily (with meals)   Yes Historical Provider, MD   furosemide (LASIX) 20 MG tablet Take 1 tablet by mouth 2 times daily 8/10/18  Yes Sergio Elizalde MD   metoprolol succinate (TOPROL XL) 25 MG extended release tablet Take 0.5 tablets by mouth daily 8/10/18  Yes Sergio Elizalde MD   loratadine (CLARITIN) 10 MG capsule Take 10 mg by mouth daily   Yes Historical Provider, MD   diltiazem (DILACOR XR) 240 MG extended release capsule Take 240 mg by mouth daily   Yes Historical Provider, MD   atorvastatin (LIPITOR) 40 MG tablet Take 40 mg by mouth nightly  10/18/16  Yes Historical Provider, MD   Aspirin Buf,VwOrq-MgQge-RsNup, (BUFFERED ASPIRIN) 325 MG TABS Take 325 mg by mouth daily    Historical Provider, MD   Multiple Vitamin (MULTIVITAMIN PO) Take  by mouth. Historical Provider, MD   ibuprofen (ADVIL;MOTRIN) 200 MG CAPS Take 1 capsule by mouth as needed.       Historical Provider, MD       Current medications:    Current Facility-Administered Medications   Medication Dose Route Frequency Provider Last Rate Last Dose    0.9 % sodium chloride infusion   Intravenous Continuous Jerel Culver PA-C 75 mL/hr at 18 1138      ceFAZolin (ANCEF) 2 g in dextrose 5 % 50 mL IVPB  2 g Intravenous On Call to Subha Go PA-C        chlorhexidine (HIBICLENS) 4 % liquid   Topical Once Jerel Culver PA-C        sodium chloride flush 0.9 % injection 10 mL  10 mL Intravenous 2 times per day Jerel Culver PA-C        sodium chloride flush 0.9 % injection 10 mL  10 mL Intravenous PRN Bhavesh Jensen PA-C        bacitracin 50,000 Units in sodium chloride 0.9 % 1,000 mL irrigation  50,000 Units Topical Once Laya Franco MD           Allergies:  No Known Allergies    Problem List:    Patient Active Problem List   Diagnosis Code    Bronchitis A67    Umbilical hernia Z71.7    Incisional hernia, without obstruction or gangrene K43.2    Occult GI bleeding R19.5    Paroxysmal supraventricular tachycardia (HCC) I47.1    Left bundle branch block I44.7    Essential hypertension I10    Dyslipidemia E78.5    H/O cardiac murmur Z86.79    Wide-complex tachycardia (HCC) I47.2    HTN (hypertension) I10    Demand ischemia (HCC) I24.8    Leucocytosis D72.829    Hypokalemia E87.6    Closed bimalleolar fracture of right ankle with routine healing S82.841D    Blepharospasm G24.5    Clonic hemifacial spasm G51.3    SVT (supraventricular tachycardia) (HCC) I47.1       Past Medical History:        Diagnosis Date    Allergic rhinitis     Facial weakness ~ 2002    traumatic injury    H/O cardiovascular stress test 11/28/2016    Probably normal myocardial perfusion SPECT imagin. There is no evidence of ischemia. There is a fixed defect in septum of uncertain etiology but potentially due to LBBB, attenuation artifact or less likely prior infarct. Normal wall motion and wall thickening.  H/O echocardiogram 11/04/2016    H/O echocardiogram 07/11/2018    Phenotypic picture of hypertrophic cardiomyopathy with septal hyertrophy and systolic antreior motion of the mitral valve but without signifcant resting outflow tract obstruction. EF >70%. The LV wall thickness is mildly increased. Systolic anterior motion (TOBY) of the mitral valve with mild to moderate mitral regurg. Mild tricuspid regurg. Evidence of mild (grade I) diastolic dysfunction is seen.     Hypertension 2011    Psoriasis     S/P ablation of atrial flutter 02/21/2017    Dr. Blanca Moy Trochanteric bursitis 2013    right       Past Surgical History:        Procedure Laterality Date    ANKLE FRACTURE SURGERY Right 02/25/2017    ANKLE FRACTURE SURGERY Right 2/25/2017    ANKLE OPEN REDUCTION INTERNAL FIXATION-PARAGON. APPLCATION OF A SPLINT performed by Charliene Gitelman, DPM at [address] Right 02/25/2017    ORIF    APPENDECTOMY  1970'S    HERNIA REPAIR      ventral    HYSTERECTOMY  1978    STILL HAS OVARIES    TOE SURGERY  10 - 15 years ago    5th left toe    UMBILICAL HERNIA REPAIR  03/12       Social History:    Social History   Substance Use Topics    Smoking status: Former Smoker     Packs/day: 0.50     Years: 40.00     Types: Cigarettes     Quit date: 10/25/2016    Smokeless tobacco: Never Used    Alcohol use No                                Counseling given: Not Answered      Vital Signs (Current):   Vitals:    09/14/18 1105 09/14/18 1130   BP: 116/62    Pulse: 101 93   Resp: 17    Temp: 97.7 °F (36.5 °C)    TempSrc: Oral    SpO2: 97%    Weight: 192 lb (87.1 kg)    Height: 5' 7\" (1.702 m)                                               BP Readings from Last 3 Encounters:   09/14/18 116/62   09/05/18 112/82   08/10/18 119/79       NPO Status:                                                                                 BMI:   Wt Readings from Last 3 Encounters:   09/14/18 192 lb (87.1 kg)   09/05/18 192 lb (87.1 kg)   08/10/18 193 lb (87.5 kg)     Body mass index is 30.07 kg/m².     CBC:   Lab Results   Component Value Date    WBC 9.3 09/14/2018    RBC 4.63 09/14/2018    RBC 3.84 03/20/2012    HGB 14.4 09/14/2018    HCT 43.7 09/14/2018    MCV 94.4 09/14/2018    RDW 13.4 09/10/2018     09/14/2018     03/20/2012       CMP:   Lab Results   Component Value Date     09/10/2018    K 4.7 09/10/2018     09/10/2018    CO2 27 09/10/2018    BUN 13 09/10/2018    CREATININE 0.85 09/10/2018    GFRAA >60 09/10/2018    LABGLOM >60 09/10/2018    GLUCOSE 116

## 2018-09-15 ENCOUNTER — APPOINTMENT (OUTPATIENT)
Dept: GENERAL RADIOLOGY | Age: 68
End: 2018-09-15
Attending: INTERNAL MEDICINE
Payer: MEDICARE

## 2018-09-15 VITALS
WEIGHT: 192 LBS | SYSTOLIC BLOOD PRESSURE: 184 MMHG | DIASTOLIC BLOOD PRESSURE: 70 MMHG | HEIGHT: 67 IN | HEART RATE: 88 BPM | RESPIRATION RATE: 18 BRPM | OXYGEN SATURATION: 91 % | TEMPERATURE: 97.7 F | BODY MASS INDEX: 30.13 KG/M2

## 2018-09-15 LAB
EKG ATRIAL RATE: 85 BPM
EKG P AXIS: 73 DEGREES
EKG P-R INTERVAL: 174 MS
EKG Q-T INTERVAL: 418 MS
EKG QRS DURATION: 140 MS
EKG QTC CALCULATION (BAZETT): 497 MS
EKG R AXIS: -7 DEGREES
EKG T AXIS: 124 DEGREES
EKG VENTRICULAR RATE: 85 BPM
GLUCOSE BLD-MCNC: 130 MG/DL (ref 70–108)

## 2018-09-15 PROCEDURE — 71046 X-RAY EXAM CHEST 2 VIEWS: CPT

## 2018-09-15 PROCEDURE — 82948 REAGENT STRIP/BLOOD GLUCOSE: CPT

## 2018-09-15 PROCEDURE — G0008 ADMIN INFLUENZA VIRUS VAC: HCPCS | Performed by: INTERNAL MEDICINE

## 2018-09-15 PROCEDURE — 6360000002 HC RX W HCPCS: Performed by: INTERNAL MEDICINE

## 2018-09-15 PROCEDURE — 93005 ELECTROCARDIOGRAM TRACING: CPT | Performed by: INTERNAL MEDICINE

## 2018-09-15 PROCEDURE — 6370000000 HC RX 637 (ALT 250 FOR IP): Performed by: INTERNAL MEDICINE

## 2018-09-15 PROCEDURE — 90686 IIV4 VACC NO PRSV 0.5 ML IM: CPT | Performed by: INTERNAL MEDICINE

## 2018-09-15 PROCEDURE — 93010 ELECTROCARDIOGRAM REPORT: CPT | Performed by: INTERNAL MEDICINE

## 2018-09-15 RX ADMIN — HYDROCODONE BITARTRATE AND ACETAMINOPHEN 1 TABLET: 5; 325 TABLET ORAL at 08:38

## 2018-09-15 RX ADMIN — DILTIAZEM HYDROCHLORIDE 240 MG: 240 CAPSULE, COATED, EXTENDED RELEASE ORAL at 08:29

## 2018-09-15 RX ADMIN — METOPROLOL SUCCINATE 12.5 MG: 25 TABLET, EXTENDED RELEASE ORAL at 08:31

## 2018-09-15 RX ADMIN — FUROSEMIDE 20 MG: 20 TABLET ORAL at 08:30

## 2018-09-15 RX ADMIN — HYDROCODONE BITARTRATE AND ACETAMINOPHEN 1 TABLET: 5; 325 TABLET ORAL at 03:16

## 2018-09-15 RX ADMIN — INFLUENZA A VIRUS A/MICHIGAN/45/2015 X-275 (H1N1) ANTIGEN (FORMALDEHYDE INACTIVATED), INFLUENZA A VIRUS A/SINGAPORE/INFIMH-16-0019/2016 IVR-186 (H3N2) ANTIGEN (FORMALDEHYDE INACTIVATED), INFLUENZA B VIRUS B/PHUKET/3073/2013 ANTIGEN (FORMALDEHYDE INACTIVATED), AND INFLUENZA B VIRUS B/MARYLAND/15/2016 BX-69A ANTIGEN (FORMALDEHYDE INACTIVATED) 0.5 ML: 15; 15; 15; 15 INJECTION, SUSPENSION INTRAMUSCULAR at 11:43

## 2018-09-15 ASSESSMENT — PAIN SCALES - GENERAL
PAINLEVEL_OUTOF10: 2
PAINLEVEL_OUTOF10: 3
PAINLEVEL_OUTOF10: 2

## 2018-09-15 ASSESSMENT — PAIN DESCRIPTION - PAIN TYPE: TYPE: ACUTE PAIN;SURGICAL PAIN

## 2018-09-15 ASSESSMENT — PAIN DESCRIPTION - ORIENTATION: ORIENTATION: LEFT

## 2018-09-15 ASSESSMENT — PAIN DESCRIPTION - LOCATION: LOCATION: SHOULDER

## 2018-09-15 ASSESSMENT — PAIN DESCRIPTION - DESCRIPTORS: DESCRIPTORS: SORE

## 2018-09-15 NOTE — DISCHARGE SUMMARY
CARDIOLOGY ELECTROPHYSIOLOGY   DISCHARGE NOTE    DATE:  9/15/2018    SUBJECTIVE:  The patient did well overnight. There were no events reported by the nursing staff. The patient denies having any chest pain, pleurisy, palpitations, or dyspnea. EXAM:  Vitals:    09/14/18 2059 09/14/18 2359 09/15/18 0310 09/15/18 0812   BP: (!) 145/69 137/75 123/76 (!) 184/70   Pulse: 92 96 84 88   Resp: 16 16 16 18   Temp: 97.7 °F (36.5 °C) 98.2 °F (36.8 °C) 97.6 °F (36.4 °C) 97.7 °F (36.5 °C)   TempSrc: Oral Oral Oral Oral   SpO2: 94% 92% 92% 91%   Weight:       Height:         Op Site: Dressing is dry, no hematoma  CV: RRR  RESPIRATIONS: CTA-B  EX: No edema    DIAGNOSTIC STUDIES:    ECG: NSR    CXR PA/LAT:  No pneumothorax, leads in good position. IMPRESSION:  The patient is s/p insertion of a dual chamber pacemaker. PLAN:  1. D/C: Home    2. F/U: 1. Pacemaker Clinic Call ([phone]) the office upon arrival at home to make a 7-10 day follow up appointment and a 6 week follow-up appointment will be made for a device check. 2. When visiting any hospital or physician, inform them that you have a device    3. MEDS:     Medication List      CONTINUE taking these medications    atorvastatin 40 MG tablet  Commonly known as:  LIPITOR     buffered aspirin 325 MG Tabs     diltiazem 240 MG extended release capsule  Commonly known as:  DILACOR XR     furosemide 20 MG tablet  Commonly known as:  LASIX  Take 1 tablet by mouth 2 times daily     ibuprofen 200 MG Caps  Commonly known as:  ADVIL;MOTRIN     loratadine 10 MG capsule  Commonly known as:  CLARITIN     metFORMIN 500 MG tablet  Commonly known as:  GLUCOPHAGE     metoprolol succinate 25 MG extended release tablet  Commonly known as:  TOPROL XL  Take 0.5 tablets by mouth daily     MULTIVITAMIN PO            4. ACTIVITY: Follow discharge activity and wound care instructions. 5. DIET:  Resume previous diet.

## 2018-09-15 NOTE — OP NOTE
Right ventricle:  Polarity, bipolar, R-waves 19.8 mV, amplitude  threshold was 0.53 volts at 0.5 milliseconds, impedance 991 ohms. Sp parameters were programmed DDDR lower rate 60, upper rate 130. CONCLUSION:  Successful insertion of a dual-chamber pacemaker for  intermittent high-grade AV block. I will remind the Med[employer]onic pacer _____ rate response of tomorrow during  postop day #1 device check.         Clemente Calixto MD    D: 09/14/2018 15:38:03       T: 09/14/2018 15:40:35     [id number]  Job#: [id number]     Doc#: [id number]    CC:

## 2018-09-15 NOTE — PROGRESS NOTES
1215 - Discharge instructions reviewed with patient and . Went over what to do and not to do regarding pacemaker site and arm on pacemaker side. Understanding voiced. Site without redness, drainage or swelling, steri strips intact. Patient asking when to take steri strips off - told to leave them alone and let them fall off on their own. Patient has pacemaker packet off chart and box from pacer rep. Reminded to carry her pacemaker ID card and that she should get a medic alert bracelet or necklace that lets people know she has a pacemaker. At end of session patient denies further questions. [phone] - Discharged via wheelchair to private car.  had already taken down her belongings and home meds.   Condition stable

## 2018-09-15 NOTE — PLAN OF CARE
Problem: Preoperative Routine:  Goal: Risk for injury before, during, or after surgery or procedure will decrease  Risk for injury before, during, or after surgery or procedure will decrease   Outcome: Ongoing  No injury thus far during shift. Problem: Discharge Planning:  Goal: Discharged to appropriate level of care  Discharged to appropriate level of care   Outcome: Ongoing  Discharge planning in progress. Problem: Pain:  Goal: Pain level will decrease  Pain level will decrease   Outcome: Ongoing  Pain managed with pain medication every 4 hours as needed. Ice pack applied intermittently to surgical site. Patient resting with eyes closed comfortably at this time. Will continue to monitor. Problem: Risk for Impaired Skin Integrity  Goal: Tissue integrity - skin and mucous membranes  Structural intactness and normal physiological function of skin and  mucous membranes. Outcome: Ongoing  Patient able to reposition and turn self. Comments: Care plan reviewed with patient. Patient verbalize understanding of the plan of care and contribute to goal setting.

## 2018-09-20 ENCOUNTER — TELEPHONE (OUTPATIENT)
Dept: CARDIOLOGY CLINIC | Age: 68
End: 2018-09-20

## 2018-09-21 ENCOUNTER — OFFICE VISIT (OUTPATIENT)
Dept: CARDIOLOGY | Age: 68
End: 2018-09-21
Payer: MEDICARE

## 2018-09-21 VITALS
HEART RATE: 86 BPM | BODY MASS INDEX: 30.32 KG/M2 | OXYGEN SATURATION: 93 % | DIASTOLIC BLOOD PRESSURE: 73 MMHG | WEIGHT: 193.2 LBS | SYSTOLIC BLOOD PRESSURE: 120 MMHG | HEIGHT: 67 IN

## 2018-09-21 DIAGNOSIS — Z95.0 CARDIAC PACEMAKER IN SITU: Primary | ICD-10-CM

## 2018-09-21 DIAGNOSIS — Z98.890 STATUS POST ABLATION OF ACCESSORY BYPASS TRACT: ICD-10-CM

## 2018-09-21 DIAGNOSIS — I50.32 CHRONIC DIASTOLIC CONGESTIVE HEART FAILURE (HCC): ICD-10-CM

## 2018-09-21 DIAGNOSIS — R21 RASH: ICD-10-CM

## 2018-09-21 DIAGNOSIS — I10 ESSENTIAL HYPERTENSION: ICD-10-CM

## 2018-09-21 PROCEDURE — 3017F COLORECTAL CA SCREEN DOC REV: CPT | Performed by: INTERNAL MEDICINE

## 2018-09-21 PROCEDURE — 4040F PNEUMOC VAC/ADMIN/RCVD: CPT | Performed by: INTERNAL MEDICINE

## 2018-09-21 PROCEDURE — 1123F ACP DISCUSS/DSCN MKR DOCD: CPT | Performed by: INTERNAL MEDICINE

## 2018-09-21 PROCEDURE — G8427 DOCREV CUR MEDS BY ELIG CLIN: HCPCS | Performed by: INTERNAL MEDICINE

## 2018-09-21 PROCEDURE — G8417 CALC BMI ABV UP PARAM F/U: HCPCS | Performed by: INTERNAL MEDICINE

## 2018-09-21 PROCEDURE — 1101F PT FALLS ASSESS-DOCD LE1/YR: CPT | Performed by: INTERNAL MEDICINE

## 2018-09-21 PROCEDURE — G8598 ASA/ANTIPLAT THER USED: HCPCS | Performed by: INTERNAL MEDICINE

## 2018-09-21 PROCEDURE — 1090F PRES/ABSN URINE INCON ASSESS: CPT | Performed by: INTERNAL MEDICINE

## 2018-09-21 PROCEDURE — G8400 PT W/DXA NO RESULTS DOC: HCPCS | Performed by: INTERNAL MEDICINE

## 2018-09-21 PROCEDURE — 1036F TOBACCO NON-USER: CPT | Performed by: INTERNAL MEDICINE

## 2018-09-21 PROCEDURE — 99214 OFFICE O/P EST MOD 30 MIN: CPT | Performed by: INTERNAL MEDICINE

## 2018-09-21 RX ORDER — DIPHENHYDRAMINE HCL 25 MG
25 TABLET ORAL EVERY 6 HOURS PRN
COMMUNITY
End: 2019-02-07 | Stop reason: ALTCHOICE

## 2018-09-21 RX ORDER — CALAMINE
LOTION (ML) TOPICAL PRN
COMMUNITY

## 2018-09-21 NOTE — PROGRESS NOTES
Essential Hypertension: Controlled  Diuretics: Continue furosemide (lasix) 20 mg    Beta Blocker Therapy: Continue Metoprolol succinate (Toprol XL)     Calcium Channel Blocker: Continue diltiazem        Dual Lead Medtronic Pacemaker  Indication for Device Placement: Intermittent high grade AV block  Interrogation Findings: We will have her come in to have her pacemaker checked in 1 month with the [employer] Rep. FOLLOW UP:   I told Ms. Wendy Joseph to call my office if she had any problems, but otherwise told her to Return in about 6 months (around 3/21/2019), or 1 month with Med[employer] Rep, for Follow up. However, I would be happy to see her sooner should the need arise. Evelyn Mulligan MD, MS, F.A.C.C. [facility], [address] [place] [address]  Phone: [phone], Fax: [phone]    I believe that the risk of significant morbidity and mortality related to the patient's current medical conditions are: Intermediate. The documentation recorded by the scribe, accurately and completely reflects the services I personally performed and the decisions made by me. Luh Yeboah MD, F.A.C.C.  September 22, 2018

## 2018-09-21 NOTE — PATIENT INSTRUCTIONS
SURVEY:    You may be receiving a survey from [employer] regarding your visit today. Please complete the survey to enable us to provide the highest quality of care to you and your family. If you cannot score us a very good on any question, please call the office to discuss how we could have made your experience a very good one. Thank you.

## 2018-09-25 ENCOUNTER — TELEPHONE (OUTPATIENT)
Dept: CARDIOLOGY CLINIC | Age: 68
End: 2018-09-25

## 2018-10-24 ENCOUNTER — NURSE ONLY (OUTPATIENT)
Dept: CARDIOLOGY | Age: 68
End: 2018-10-24
Payer: MEDICARE

## 2018-10-24 DIAGNOSIS — I44.7 LEFT BUNDLE BRANCH BLOCK: ICD-10-CM

## 2018-10-24 DIAGNOSIS — I47.1 PAROXYSMAL SUPRAVENTRICULAR TACHYCARDIA (HCC): ICD-10-CM

## 2018-10-24 DIAGNOSIS — Z95.0 PACEMAKER: Primary | ICD-10-CM

## 2018-10-24 PROCEDURE — 93288 INTERROG EVL PM/LDLS PM IP: CPT | Performed by: INTERNAL MEDICINE

## 2018-12-28 RX ORDER — FUROSEMIDE 20 MG/1
20 TABLET ORAL 2 TIMES DAILY
Qty: 180 TABLET | Refills: 3 | Status: SHIPPED | OUTPATIENT
Start: 2018-12-28 | End: 2020-01-09 | Stop reason: SDUPTHER

## 2018-12-28 RX ORDER — ASPIRIN/CALCIUM/MAG/ALUMINUM 325 MG
325 TABLET ORAL DAILY
Qty: 90 TABLET | Refills: 3 | Status: SHIPPED | OUTPATIENT
Start: 2018-12-28

## 2018-12-28 RX ORDER — METOPROLOL SUCCINATE 25 MG/1
12.5 TABLET, EXTENDED RELEASE ORAL DAILY
Qty: 45 TABLET | Refills: 3 | Status: SHIPPED | OUTPATIENT
Start: 2018-12-28 | End: 2020-02-18

## 2019-02-07 ENCOUNTER — HOSPITAL ENCOUNTER (EMERGENCY)
Age: 69
Discharge: ANOTHER HOSPITAL | End: 2019-02-07
Payer: MEDICARE

## 2019-02-07 ENCOUNTER — TELEPHONE (OUTPATIENT)
Dept: CARDIOLOGY | Age: 69
End: 2019-02-07

## 2019-02-07 ENCOUNTER — APPOINTMENT (OUTPATIENT)
Dept: CT IMAGING | Age: 69
End: 2019-02-07
Payer: MEDICARE

## 2019-02-07 ENCOUNTER — APPOINTMENT (OUTPATIENT)
Dept: GENERAL RADIOLOGY | Age: 69
End: 2019-02-07
Payer: MEDICARE

## 2019-02-07 VITALS
OXYGEN SATURATION: 98 % | TEMPERATURE: 98.5 F | DIASTOLIC BLOOD PRESSURE: 59 MMHG | SYSTOLIC BLOOD PRESSURE: 95 MMHG | HEART RATE: 97 BPM | RESPIRATION RATE: 24 BRPM

## 2019-02-07 DIAGNOSIS — K92.2 UPPER GI BLEED: Primary | ICD-10-CM

## 2019-02-07 DIAGNOSIS — K57.32 DIVERTICULITIS OF COLON: ICD-10-CM

## 2019-02-07 DIAGNOSIS — E86.1 HYPOTENSION DUE TO HYPOVOLEMIA: ICD-10-CM

## 2019-02-07 DIAGNOSIS — D64.9 ANEMIA, UNSPECIFIED TYPE: ICD-10-CM

## 2019-02-07 DIAGNOSIS — I95.89 HYPOTENSION DUE TO HYPOVOLEMIA: ICD-10-CM

## 2019-02-07 DIAGNOSIS — R06.09 DYSPNEA ON EXERTION: ICD-10-CM

## 2019-02-07 PROBLEM — I95.9 HYPOTENSION: Status: ACTIVE | Noted: 2019-02-07

## 2019-02-07 LAB
ABSOLUTE EOS #: 0.14 K/UL (ref 0–0.44)
ABSOLUTE IMMATURE GRANULOCYTE: 0.08 K/UL (ref 0–0.3)
ABSOLUTE LYMPH #: 1.09 K/UL (ref 1.1–3.7)
ABSOLUTE MONO #: 0.78 K/UL (ref 0.1–1.2)
ALBUMIN SERPL-MCNC: 3.8 G/DL (ref 3.5–5.2)
ALBUMIN/GLOBULIN RATIO: 1.7 (ref 1–2.5)
ALP BLD-CCNC: 71 U/L (ref 35–104)
ALT SERPL-CCNC: 10 U/L (ref 5–33)
ANION GAP SERPL CALCULATED.3IONS-SCNC: 10 MMOL/L (ref 9–17)
AST SERPL-CCNC: 10 U/L
BASOPHILS # BLD: 1 % (ref 0–2)
BASOPHILS ABSOLUTE: 0.09 K/UL (ref 0–0.2)
BILIRUB SERPL-MCNC: 0.2 MG/DL (ref 0.3–1.2)
BNP INTERPRETATION: NORMAL
BUN BLDV-MCNC: 45 MG/DL (ref 8–23)
BUN/CREAT BLD: 41 (ref 9–20)
CALCIUM SERPL-MCNC: 8.5 MG/DL (ref 8.6–10.4)
CHLORIDE BLD-SCNC: 106 MMOL/L (ref 98–107)
CO2: 23 MMOL/L (ref 20–31)
CREAT SERPL-MCNC: 1.1 MG/DL (ref 0.5–0.9)
DIFFERENTIAL TYPE: ABNORMAL
EOSINOPHILS RELATIVE PERCENT: 1 % (ref 1–4)
GFR AFRICAN AMERICAN: >60 ML/MIN
GFR NON-AFRICAN AMERICAN: 50 ML/MIN
GFR SERPL CREATININE-BSD FRML MDRD: ABNORMAL ML/MIN/{1.73_M2}
GFR SERPL CREATININE-BSD FRML MDRD: ABNORMAL ML/MIN/{1.73_M2}
GLUCOSE BLD-MCNC: 137 MG/DL (ref 70–99)
HCT VFR BLD CALC: 26.9 % (ref 36.3–47.1)
HEMOGLOBIN: 8.7 G/DL (ref 11.9–15.1)
IMMATURE GRANULOCYTES: 1 %
INR BLD: 1.1 (ref 0.9–1.2)
LACTIC ACID, WHOLE BLOOD: ABNORMAL MMOL/L (ref 0.7–2.1)
LACTIC ACID: 2.4 MMOL/L (ref 0.5–2.2)
LYMPHOCYTES # BLD: 9 % (ref 24–43)
MCH RBC QN AUTO: 30.6 PG (ref 25.2–33.5)
MCHC RBC AUTO-ENTMCNC: 32.3 G/DL (ref 28.4–34.8)
MCV RBC AUTO: 94.7 FL (ref 82.6–102.9)
MONOCYTES # BLD: 6 % (ref 3–12)
NRBC AUTOMATED: 0.2 PER 100 WBC
PARTIAL THROMBOPLASTIN TIME: 24.3 SEC (ref 23.2–34.4)
PDW BLD-RTO: 13.9 % (ref 11.8–14.4)
PLATELET # BLD: 258 K/UL (ref 138–453)
PLATELET ESTIMATE: ABNORMAL
PMV BLD AUTO: 10.1 FL (ref 8.1–13.5)
POTASSIUM SERPL-SCNC: 3.8 MMOL/L (ref 3.7–5.3)
PRO-BNP: 155 PG/ML
PROTHROMBIN TIME: 11.1 SEC (ref 9.7–12.2)
RBC # BLD: 2.84 M/UL (ref 3.95–5.11)
RBC # BLD: ABNORMAL 10*6/UL
SEG NEUTROPHILS: 82 % (ref 36–65)
SEGMENTED NEUTROPHILS ABSOLUTE COUNT: 10.56 K/UL (ref 1.5–8.1)
SODIUM BLD-SCNC: 139 MMOL/L (ref 135–144)
TOTAL PROTEIN: 6.1 G/DL (ref 6.4–8.3)
TROPONIN INTERP: NORMAL
TROPONIN T: <0.03 NG/ML
TROPONIN, HIGH SENSITIVITY: NORMAL NG/L (ref 0–14)
WBC # BLD: 12.7 K/UL (ref 3.5–11.3)
WBC # BLD: ABNORMAL 10*3/UL

## 2019-02-07 PROCEDURE — 84484 ASSAY OF TROPONIN QUANT: CPT

## 2019-02-07 PROCEDURE — 99291 CRITICAL CARE FIRST HOUR: CPT

## 2019-02-07 PROCEDURE — 80053 COMPREHEN METABOLIC PANEL: CPT

## 2019-02-07 PROCEDURE — 85025 COMPLETE CBC W/AUTO DIFF WBC: CPT

## 2019-02-07 PROCEDURE — 6360000004 HC RX CONTRAST MEDICATION: Performed by: PHYSICIAN ASSISTANT

## 2019-02-07 PROCEDURE — 85730 THROMBOPLASTIN TIME PARTIAL: CPT

## 2019-02-07 PROCEDURE — 83605 ASSAY OF LACTIC ACID: CPT

## 2019-02-07 PROCEDURE — 85610 PROTHROMBIN TIME: CPT

## 2019-02-07 PROCEDURE — 2580000003 HC RX 258: Performed by: PHYSICIAN ASSISTANT

## 2019-02-07 PROCEDURE — 74177 CT ABD & PELVIS W/CONTRAST: CPT

## 2019-02-07 PROCEDURE — 83880 ASSAY OF NATRIURETIC PEPTIDE: CPT

## 2019-02-07 PROCEDURE — 71046 X-RAY EXAM CHEST 2 VIEWS: CPT

## 2019-02-07 RX ORDER — SODIUM CHLORIDE 9 MG/ML
INJECTION, SOLUTION INTRAVENOUS CONTINUOUS
Status: DISCONTINUED | OUTPATIENT
Start: 2019-02-07 | End: 2019-02-07 | Stop reason: HOSPADM

## 2019-02-07 RX ADMIN — IOPAMIDOL 75 ML: 755 INJECTION, SOLUTION INTRAVENOUS at 12:02

## 2019-02-07 RX ADMIN — SODIUM CHLORIDE: 9 INJECTION, SOLUTION INTRAVENOUS at 11:25

## 2019-02-07 ASSESSMENT — ENCOUNTER SYMPTOMS
SHORTNESS OF BREATH: 1
ABDOMINAL PAIN: 0

## 2019-02-07 ASSESSMENT — PAIN SCALES - GENERAL: PAINLEVEL_OUTOF10: 10

## 2019-02-07 ASSESSMENT — PAIN DESCRIPTION - LOCATION: LOCATION: BACK

## 2019-02-07 ASSESSMENT — PAIN DESCRIPTION - PAIN TYPE: TYPE: ACUTE PAIN

## 2019-03-28 ENCOUNTER — OFFICE VISIT (OUTPATIENT)
Dept: CARDIOLOGY | Age: 69
End: 2019-03-28
Payer: MEDICARE

## 2019-03-28 VITALS
HEART RATE: 78 BPM | SYSTOLIC BLOOD PRESSURE: 116 MMHG | BODY MASS INDEX: 30.35 KG/M2 | RESPIRATION RATE: 16 BRPM | OXYGEN SATURATION: 96 % | WEIGHT: 193.4 LBS | HEIGHT: 67 IN | DIASTOLIC BLOOD PRESSURE: 81 MMHG

## 2019-03-28 DIAGNOSIS — Z95.0 CARDIAC PACEMAKER IN SITU: ICD-10-CM

## 2019-03-28 DIAGNOSIS — I10 ESSENTIAL HYPERTENSION: ICD-10-CM

## 2019-03-28 DIAGNOSIS — I50.32 CHRONIC DIASTOLIC CHF (CONGESTIVE HEART FAILURE), NYHA CLASS 3 (HCC): Primary | ICD-10-CM

## 2019-03-28 PROCEDURE — G8427 DOCREV CUR MEDS BY ELIG CLIN: HCPCS | Performed by: INTERNAL MEDICINE

## 2019-03-28 PROCEDURE — G8598 ASA/ANTIPLAT THER USED: HCPCS | Performed by: INTERNAL MEDICINE

## 2019-03-28 PROCEDURE — 4040F PNEUMOC VAC/ADMIN/RCVD: CPT | Performed by: INTERNAL MEDICINE

## 2019-03-28 PROCEDURE — 1123F ACP DISCUSS/DSCN MKR DOCD: CPT | Performed by: INTERNAL MEDICINE

## 2019-03-28 PROCEDURE — G8482 FLU IMMUNIZE ORDER/ADMIN: HCPCS | Performed by: INTERNAL MEDICINE

## 2019-03-28 PROCEDURE — G8417 CALC BMI ABV UP PARAM F/U: HCPCS | Performed by: INTERNAL MEDICINE

## 2019-03-28 PROCEDURE — 99213 OFFICE O/P EST LOW 20 MIN: CPT | Performed by: INTERNAL MEDICINE

## 2019-03-28 PROCEDURE — 3017F COLORECTAL CA SCREEN DOC REV: CPT | Performed by: INTERNAL MEDICINE

## 2019-03-28 PROCEDURE — G8400 PT W/DXA NO RESULTS DOC: HCPCS | Performed by: INTERNAL MEDICINE

## 2019-03-28 PROCEDURE — 1090F PRES/ABSN URINE INCON ASSESS: CPT | Performed by: INTERNAL MEDICINE

## 2019-03-28 PROCEDURE — 1036F TOBACCO NON-USER: CPT | Performed by: INTERNAL MEDICINE

## 2019-03-28 RX ORDER — PANTOPRAZOLE SODIUM 40 MG/1
40 TABLET, DELAYED RELEASE ORAL 2 TIMES DAILY
COMMUNITY
Start: 2019-02-10

## 2019-04-25 PROCEDURE — 93294 REM INTERROG EVL PM/LDLS PM: CPT | Performed by: INTERNAL MEDICINE

## 2019-04-26 ENCOUNTER — TELEPHONE (OUTPATIENT)
Dept: CARDIOLOGY | Age: 69
End: 2019-04-26

## 2019-04-26 ENCOUNTER — NURSE ONLY (OUTPATIENT)
Dept: CARDIOLOGY | Age: 69
End: 2019-04-26
Payer: MEDICARE

## 2019-04-26 DIAGNOSIS — Z95.0 PACEMAKER: Primary | ICD-10-CM

## 2019-04-26 DIAGNOSIS — I44.39 HIGH-GRADE ATRIOVENTRICULAR BLOCK: ICD-10-CM

## 2019-04-26 DIAGNOSIS — R00.0 WIDE-COMPLEX TACHYCARDIA: ICD-10-CM

## 2019-10-14 ENCOUNTER — OFFICE VISIT (OUTPATIENT)
Dept: CARDIOLOGY | Age: 69
End: 2019-10-14
Payer: MEDICARE

## 2019-10-14 VITALS
SYSTOLIC BLOOD PRESSURE: 135 MMHG | BODY MASS INDEX: 30.83 KG/M2 | HEIGHT: 67 IN | WEIGHT: 196.4 LBS | HEART RATE: 81 BPM | DIASTOLIC BLOOD PRESSURE: 78 MMHG

## 2019-10-14 DIAGNOSIS — I34.0 NONRHEUMATIC MITRAL VALVE REGURGITATION: ICD-10-CM

## 2019-10-14 DIAGNOSIS — I10 ESSENTIAL HYPERTENSION: ICD-10-CM

## 2019-10-14 DIAGNOSIS — I44.39 HIGH-GRADE ATRIOVENTRICULAR BLOCK: Primary | ICD-10-CM

## 2019-10-14 DIAGNOSIS — I50.32 CHRONIC DIASTOLIC CHF (CONGESTIVE HEART FAILURE), NYHA CLASS 3 (HCC): ICD-10-CM

## 2019-10-14 DIAGNOSIS — Z95.0 CARDIAC PACEMAKER IN SITU: ICD-10-CM

## 2019-10-14 PROCEDURE — G8427 DOCREV CUR MEDS BY ELIG CLIN: HCPCS | Performed by: INTERNAL MEDICINE

## 2019-10-14 PROCEDURE — 1090F PRES/ABSN URINE INCON ASSESS: CPT | Performed by: INTERNAL MEDICINE

## 2019-10-14 PROCEDURE — 99214 OFFICE O/P EST MOD 30 MIN: CPT | Performed by: INTERNAL MEDICINE

## 2019-10-14 PROCEDURE — 1036F TOBACCO NON-USER: CPT | Performed by: INTERNAL MEDICINE

## 2019-10-14 PROCEDURE — 93000 ELECTROCARDIOGRAM COMPLETE: CPT | Performed by: INTERNAL MEDICINE

## 2019-10-14 PROCEDURE — G8417 CALC BMI ABV UP PARAM F/U: HCPCS | Performed by: INTERNAL MEDICINE

## 2019-10-14 PROCEDURE — 3017F COLORECTAL CA SCREEN DOC REV: CPT | Performed by: INTERNAL MEDICINE

## 2019-10-14 PROCEDURE — 4040F PNEUMOC VAC/ADMIN/RCVD: CPT | Performed by: INTERNAL MEDICINE

## 2019-10-14 PROCEDURE — G8598 ASA/ANTIPLAT THER USED: HCPCS | Performed by: INTERNAL MEDICINE

## 2019-10-14 PROCEDURE — G8484 FLU IMMUNIZE NO ADMIN: HCPCS | Performed by: INTERNAL MEDICINE

## 2019-10-14 PROCEDURE — G8400 PT W/DXA NO RESULTS DOC: HCPCS | Performed by: INTERNAL MEDICINE

## 2019-10-14 PROCEDURE — 1123F ACP DISCUSS/DSCN MKR DOCD: CPT | Performed by: INTERNAL MEDICINE

## 2019-10-14 RX ORDER — PHENOL 1.4 %
AEROSOL, SPRAY (ML) MUCOUS MEMBRANE NIGHTLY
COMMUNITY

## 2019-12-10 ENCOUNTER — HOSPITAL ENCOUNTER (OUTPATIENT)
Age: 69
Setting detail: SPECIMEN
Discharge: HOME | End: 2019-12-10
Payer: MEDICARE

## 2019-12-10 LAB
ABSOLUTE EOS #: 0.31 K/UL (ref 0–0.44)
ABSOLUTE IMMATURE GRANULOCYTE: 0.13 K/UL (ref 0–0.3)
ABSOLUTE LYMPH #: 1.1 K/UL (ref 1.1–3.7)
ABSOLUTE MONO #: 0.64 K/UL (ref 0.1–1.2)
ALBUMIN SERPL-MCNC: 4.2 G/DL (ref 3.5–5.2)
ALBUMIN/GLOBULIN RATIO: 1.5 (ref 1–2.5)
ALP BLD-CCNC: 81 U/L (ref 35–104)
ALT SERPL-CCNC: 16 U/L (ref 5–33)
ANION GAP SERPL CALCULATED.3IONS-SCNC: 13 MMOL/L (ref 9–17)
AST SERPL-CCNC: 19 U/L
BASOPHILS # BLD: 2 % (ref 0–2)
BASOPHILS ABSOLUTE: 0.1 K/UL (ref 0–0.2)
BILIRUB SERPL-MCNC: 0.3 MG/DL (ref 0.3–1.2)
BUN BLDV-MCNC: 14 MG/DL (ref 8–23)
BUN/CREAT BLD: ABNORMAL (ref 9–20)
CALCIUM SERPL-MCNC: 9.5 MG/DL (ref 8.6–10.4)
CHLORIDE BLD-SCNC: 102 MMOL/L (ref 98–107)
CHOLESTEROL, FASTING: 174 MG/DL
CHOLESTEROL/HDL RATIO: 4.2
CO2: 25 MMOL/L (ref 20–31)
CREAT SERPL-MCNC: 0.89 MG/DL (ref 0.5–0.9)
DIFFERENTIAL TYPE: ABNORMAL
EOSINOPHILS RELATIVE PERCENT: 5 % (ref 1–4)
GFR AFRICAN AMERICAN: >60 ML/MIN
GFR NON-AFRICAN AMERICAN: >60 ML/MIN
GFR SERPL CREATININE-BSD FRML MDRD: ABNORMAL ML/MIN/{1.73_M2}
GFR SERPL CREATININE-BSD FRML MDRD: ABNORMAL ML/MIN/{1.73_M2}
GLUCOSE BLD-MCNC: 102 MG/DL (ref 70–99)
HCT VFR BLD CALC: 41.5 % (ref 36.3–47.1)
HDLC SERPL-MCNC: 41 MG/DL
HEMOGLOBIN: 12.9 G/DL (ref 11.9–15.1)
IMMATURE GRANULOCYTES: 2 %
LDL CHOLESTEROL: 77 MG/DL (ref 0–130)
LYMPHOCYTES # BLD: 16 % (ref 24–43)
MCH RBC QN AUTO: 29.6 PG (ref 25.2–33.5)
MCHC RBC AUTO-ENTMCNC: 31.1 G/DL (ref 28.4–34.8)
MCV RBC AUTO: 95.2 FL (ref 82.6–102.9)
MONOCYTES # BLD: 10 % (ref 3–12)
NRBC AUTOMATED: 0 PER 100 WBC
PDW BLD-RTO: 14.3 % (ref 11.8–14.4)
PLATELET # BLD: ABNORMAL K/UL (ref 138–453)
PLATELET ESTIMATE: ABNORMAL
PLATELET, FLUORESCENCE: NORMAL K/UL (ref 138–453)
PLATELET, IMMATURE FRACTION: NORMAL % (ref 1.1–10.3)
PMV BLD AUTO: ABNORMAL FL (ref 8.1–13.5)
POTASSIUM SERPL-SCNC: 4.7 MMOL/L (ref 3.7–5.3)
RBC # BLD: 4.36 M/UL (ref 3.95–5.11)
RBC # BLD: ABNORMAL 10*6/UL
SEG NEUTROPHILS: 66 % (ref 36–65)
SEGMENTED NEUTROPHILS ABSOLUTE COUNT: 4.47 K/UL (ref 1.5–8.1)
SODIUM BLD-SCNC: 140 MMOL/L (ref 135–144)
TOTAL PROTEIN: 7 G/DL (ref 6.4–8.3)
TRIGLYCERIDE, FASTING: 279 MG/DL
TSH SERPL DL<=0.05 MIU/L-ACNC: 3.36 MIU/L (ref 0.3–5)
VLDLC SERPL CALC-MCNC: ABNORMAL MG/DL (ref 1–30)
WBC # BLD: 6.8 K/UL (ref 3.5–11.3)
WBC # BLD: ABNORMAL 10*3/UL

## 2020-01-09 RX ORDER — FUROSEMIDE 20 MG/1
20 TABLET ORAL 2 TIMES DAILY
Qty: 180 TABLET | Refills: 3 | Status: SHIPPED | OUTPATIENT
Start: 2020-01-09 | End: 2020-06-26

## 2020-01-18 ENCOUNTER — APPOINTMENT (OUTPATIENT)
Dept: GENERAL RADIOLOGY | Age: 70
DRG: 193 | End: 2020-01-18
Payer: MEDICARE

## 2020-01-18 ENCOUNTER — HOSPITAL ENCOUNTER (INPATIENT)
Age: 70
LOS: 3 days | Discharge: HOME | DRG: 193 | End: 2020-01-21
Attending: EMERGENCY MEDICINE | Admitting: FAMILY MEDICINE
Payer: MEDICARE

## 2020-01-18 LAB
ABSOLUTE EOS #: 0.22 K/UL (ref 0–0.44)
ABSOLUTE IMMATURE GRANULOCYTE: <0.03 K/UL (ref 0–0.3)
ABSOLUTE LYMPH #: 1.34 K/UL (ref 1.1–3.7)
ABSOLUTE MONO #: 0.82 K/UL (ref 0.1–1.2)
ALBUMIN SERPL-MCNC: 4.1 G/DL (ref 3.5–5.2)
ALBUMIN/GLOBULIN RATIO: 1.5 (ref 1–2.5)
ALP BLD-CCNC: 93 U/L (ref 35–104)
ALT SERPL-CCNC: 15 U/L (ref 5–33)
ANION GAP SERPL CALCULATED.3IONS-SCNC: 14 MMOL/L (ref 9–17)
AST SERPL-CCNC: 18 U/L
BASOPHILS # BLD: 1 % (ref 0–2)
BASOPHILS ABSOLUTE: 0.04 K/UL (ref 0–0.2)
BILIRUB SERPL-MCNC: 0.26 MG/DL (ref 0.3–1.2)
BNP INTERPRETATION: NORMAL
BUN BLDV-MCNC: 15 MG/DL (ref 8–23)
BUN/CREAT BLD: 15 (ref 9–20)
CALCIUM SERPL-MCNC: 9.1 MG/DL (ref 8.6–10.4)
CHLORIDE BLD-SCNC: 95 MMOL/L (ref 98–107)
CO2: 26 MMOL/L (ref 20–31)
CREAT SERPL-MCNC: 0.99 MG/DL (ref 0.5–0.9)
D-DIMER QUANTITATIVE: 0.55 MG/L FEU (ref 0.19–0.5)
DIFFERENTIAL TYPE: ABNORMAL
DIRECT EXAM: NORMAL
EOSINOPHILS RELATIVE PERCENT: 4 % (ref 1–4)
GFR AFRICAN AMERICAN: >60 ML/MIN
GFR NON-AFRICAN AMERICAN: 56 ML/MIN
GFR SERPL CREATININE-BSD FRML MDRD: ABNORMAL ML/MIN/{1.73_M2}
GFR SERPL CREATININE-BSD FRML MDRD: ABNORMAL ML/MIN/{1.73_M2}
GLUCOSE BLD-MCNC: 101 MG/DL (ref 70–99)
HCT VFR BLD CALC: 40.9 % (ref 36.3–47.1)
HEMOGLOBIN: 13 G/DL (ref 11.9–15.1)
IMMATURE GRANULOCYTES: 0 %
INR BLD: 1 (ref 0.9–1.2)
LACTIC ACID, SEPSIS WHOLE BLOOD: NORMAL MMOL/L (ref 0.5–1.9)
LACTIC ACID, SEPSIS: 1.5 MMOL/L (ref 0.5–1.9)
LIPASE: 34 U/L (ref 13–60)
LYMPHOCYTES # BLD: 22 % (ref 24–43)
Lab: NORMAL
MCH RBC QN AUTO: 29.7 PG (ref 25.2–33.5)
MCHC RBC AUTO-ENTMCNC: 31.8 G/DL (ref 28.4–34.8)
MCV RBC AUTO: 93.4 FL (ref 82.6–102.9)
MONOCYTES # BLD: 13 % (ref 3–12)
NRBC AUTOMATED: 0 PER 100 WBC
PDW BLD-RTO: 13.5 % (ref 11.8–14.4)
PLATELET # BLD: 206 K/UL (ref 138–453)
PLATELET ESTIMATE: ABNORMAL
PMV BLD AUTO: 9.9 FL (ref 8.1–13.5)
POTASSIUM SERPL-SCNC: 3.1 MMOL/L (ref 3.7–5.3)
PRO-BNP: 135 PG/ML
PROTHROMBIN TIME: 10.4 SEC (ref 9.7–12.2)
RBC # BLD: 4.38 M/UL (ref 3.95–5.11)
RBC # BLD: ABNORMAL 10*6/UL
SEG NEUTROPHILS: 60 % (ref 36–65)
SEGMENTED NEUTROPHILS ABSOLUTE COUNT: 3.69 K/UL (ref 1.5–8.1)
SODIUM BLD-SCNC: 135 MMOL/L (ref 135–144)
SPECIMEN DESCRIPTION: NORMAL
TOTAL PROTEIN: 6.9 G/DL (ref 6.4–8.3)
TROPONIN INTERP: NORMAL
TROPONIN T: <0.03 NG/ML
TROPONIN, HIGH SENSITIVITY: NORMAL NG/L (ref 0–14)
WBC # BLD: 6.1 K/UL (ref 3.5–11.3)
WBC # BLD: ABNORMAL 10*3/UL

## 2020-01-18 PROCEDURE — 71045 X-RAY EXAM CHEST 1 VIEW: CPT

## 2020-01-18 PROCEDURE — 93005 ELECTROCARDIOGRAM TRACING: CPT | Performed by: EMERGENCY MEDICINE

## 2020-01-18 PROCEDURE — 2580000003 HC RX 258: Performed by: FAMILY MEDICINE

## 2020-01-18 PROCEDURE — 85379 FIBRIN DEGRADATION QUANT: CPT

## 2020-01-18 PROCEDURE — 85025 COMPLETE CBC W/AUTO DIFF WBC: CPT

## 2020-01-18 PROCEDURE — 6360000002 HC RX W HCPCS: Performed by: FAMILY MEDICINE

## 2020-01-18 PROCEDURE — 87040 BLOOD CULTURE FOR BACTERIA: CPT

## 2020-01-18 PROCEDURE — 6370000000 HC RX 637 (ALT 250 FOR IP): Performed by: EMERGENCY MEDICINE

## 2020-01-18 PROCEDURE — 1200000000 HC SEMI PRIVATE

## 2020-01-18 PROCEDURE — 80053 COMPREHEN METABOLIC PANEL: CPT

## 2020-01-18 PROCEDURE — 2580000003 HC RX 258: Performed by: EMERGENCY MEDICINE

## 2020-01-18 PROCEDURE — 83735 ASSAY OF MAGNESIUM: CPT

## 2020-01-18 PROCEDURE — 87804 INFLUENZA ASSAY W/OPTIC: CPT

## 2020-01-18 PROCEDURE — 6360000002 HC RX W HCPCS: Performed by: EMERGENCY MEDICINE

## 2020-01-18 PROCEDURE — 99285 EMERGENCY DEPT VISIT HI MDM: CPT

## 2020-01-18 PROCEDURE — 84484 ASSAY OF TROPONIN QUANT: CPT

## 2020-01-18 PROCEDURE — 87086 URINE CULTURE/COLONY COUNT: CPT

## 2020-01-18 PROCEDURE — 96375 TX/PRO/DX INJ NEW DRUG ADDON: CPT

## 2020-01-18 PROCEDURE — 85610 PROTHROMBIN TIME: CPT

## 2020-01-18 PROCEDURE — 36415 COLL VENOUS BLD VENIPUNCTURE: CPT

## 2020-01-18 PROCEDURE — 83690 ASSAY OF LIPASE: CPT

## 2020-01-18 PROCEDURE — 83880 ASSAY OF NATRIURETIC PEPTIDE: CPT

## 2020-01-18 PROCEDURE — 83605 ASSAY OF LACTIC ACID: CPT

## 2020-01-18 PROCEDURE — 94664 DEMO&/EVAL PT USE INHALER: CPT

## 2020-01-18 PROCEDURE — 96374 THER/PROPH/DIAG INJ IV PUSH: CPT

## 2020-01-18 RX ORDER — ONDANSETRON 2 MG/ML
4 INJECTION INTRAMUSCULAR; INTRAVENOUS EVERY 6 HOURS PRN
Status: DISCONTINUED | OUTPATIENT
Start: 2020-01-18 | End: 2020-01-21 | Stop reason: HOSPADM

## 2020-01-18 RX ORDER — FAMOTIDINE 20 MG/1
20 TABLET, FILM COATED ORAL 2 TIMES DAILY
Status: DISCONTINUED | OUTPATIENT
Start: 2020-01-19 | End: 2020-01-20 | Stop reason: SDUPTHER

## 2020-01-18 RX ORDER — SODIUM CHLORIDE 0.9 % (FLUSH) 0.9 %
10 SYRINGE (ML) INJECTION EVERY 12 HOURS SCHEDULED
Status: DISCONTINUED | OUTPATIENT
Start: 2020-01-18 | End: 2020-01-21 | Stop reason: HOSPADM

## 2020-01-18 RX ORDER — SODIUM CHLORIDE 0.9 % (FLUSH) 0.9 %
10 SYRINGE (ML) INJECTION PRN
Status: DISCONTINUED | OUTPATIENT
Start: 2020-01-18 | End: 2020-01-21 | Stop reason: HOSPADM

## 2020-01-18 RX ORDER — SODIUM CHLORIDE 9 MG/ML
INJECTION, SOLUTION INTRAVENOUS CONTINUOUS
Status: DISCONTINUED | OUTPATIENT
Start: 2020-01-19 | End: 2020-01-21 | Stop reason: HOSPADM

## 2020-01-18 RX ORDER — SODIUM CHLORIDE 0.9 % (FLUSH) 0.9 %
10 SYRINGE (ML) INJECTION EVERY 12 HOURS SCHEDULED
Status: DISCONTINUED | OUTPATIENT
Start: 2020-01-19 | End: 2020-01-21 | Stop reason: HOSPADM

## 2020-01-18 RX ORDER — METHYLPREDNISOLONE SODIUM SUCCINATE 125 MG/2ML
125 INJECTION, POWDER, LYOPHILIZED, FOR SOLUTION INTRAMUSCULAR; INTRAVENOUS ONCE
Status: COMPLETED | OUTPATIENT
Start: 2020-01-18 | End: 2020-01-18

## 2020-01-18 RX ORDER — IPRATROPIUM BROMIDE AND ALBUTEROL SULFATE 2.5; .5 MG/3ML; MG/3ML
1 SOLUTION RESPIRATORY (INHALATION) EVERY 6 HOURS
Status: DISCONTINUED | OUTPATIENT
Start: 2020-01-19 | End: 2020-01-19

## 2020-01-18 RX ORDER — IPRATROPIUM BROMIDE AND ALBUTEROL SULFATE 2.5; .5 MG/3ML; MG/3ML
1 SOLUTION RESPIRATORY (INHALATION) ONCE
Status: COMPLETED | OUTPATIENT
Start: 2020-01-18 | End: 2020-01-18

## 2020-01-18 RX ADMIN — AZITHROMYCIN MONOHYDRATE 500 MG: 500 INJECTION, POWDER, LYOPHILIZED, FOR SOLUTION INTRAVENOUS at 23:57

## 2020-01-18 RX ADMIN — IPRATROPIUM BROMIDE AND ALBUTEROL SULFATE 1 AMPULE: .5; 3 SOLUTION RESPIRATORY (INHALATION) at 21:44

## 2020-01-18 RX ADMIN — CEFTRIAXONE 1 G: 1 INJECTION, POWDER, FOR SOLUTION INTRAMUSCULAR; INTRAVENOUS at 22:50

## 2020-01-18 RX ADMIN — SODIUM CHLORIDE: 9 INJECTION, SOLUTION INTRAVENOUS at 23:50

## 2020-01-18 RX ADMIN — METHYLPREDNISOLONE SODIUM SUCCINATE 125 MG: 125 INJECTION, POWDER, FOR SOLUTION INTRAMUSCULAR; INTRAVENOUS at 21:38

## 2020-01-18 ASSESSMENT — ENCOUNTER SYMPTOMS
SHORTNESS OF BREATH: 1
ABDOMINAL PAIN: 0
EYE PAIN: 0
COUGH: 1

## 2020-01-18 ASSESSMENT — PAIN DESCRIPTION - PAIN TYPE: TYPE: ACUTE PAIN

## 2020-01-18 ASSESSMENT — PAIN SCALES - GENERAL
PAINLEVEL_OUTOF10: 5
PAINLEVEL_OUTOF10: 0

## 2020-01-18 ASSESSMENT — PAIN DESCRIPTION - LOCATION: LOCATION: RIB CAGE

## 2020-01-19 ENCOUNTER — APPOINTMENT (OUTPATIENT)
Dept: CT IMAGING | Age: 70
DRG: 193 | End: 2020-01-19
Payer: MEDICARE

## 2020-01-19 PROBLEM — E11.9 TYPE 2 DIABETES MELLITUS WITHOUT COMPLICATION, WITHOUT LONG-TERM CURRENT USE OF INSULIN (HCC): Status: ACTIVE | Noted: 2020-01-19

## 2020-01-19 PROBLEM — Z86.79 H/O ATRIAL FLUTTER: Status: ACTIVE | Noted: 2020-01-19

## 2020-01-19 PROBLEM — J18.9 PNEUMONIA OF LEFT LOWER LOBE DUE TO INFECTIOUS ORGANISM: Status: ACTIVE | Noted: 2020-01-18

## 2020-01-19 LAB
ABSOLUTE EOS #: 0 K/UL (ref 0–0.44)
ABSOLUTE IMMATURE GRANULOCYTE: 0 K/UL (ref 0–0.3)
ABSOLUTE LYMPH #: 0.64 K/UL (ref 1.1–3.7)
ABSOLUTE MONO #: 0 K/UL (ref 0.1–1.2)
ANION GAP SERPL CALCULATED.3IONS-SCNC: 16 MMOL/L (ref 9–17)
BASOPHILS # BLD: 0 % (ref 0–2)
BASOPHILS ABSOLUTE: 0 K/UL (ref 0–0.2)
BILIRUBIN URINE: NEGATIVE
BUN BLDV-MCNC: 15 MG/DL (ref 8–23)
BUN/CREAT BLD: 20 (ref 9–20)
CALCIUM SERPL-MCNC: 8.8 MG/DL (ref 8.6–10.4)
CHLORIDE BLD-SCNC: 95 MMOL/L (ref 98–107)
CO2: 24 MMOL/L (ref 20–31)
COLOR: YELLOW
COMMENT UA: ABNORMAL
CREAT SERPL-MCNC: 0.74 MG/DL (ref 0.5–0.9)
DIFFERENTIAL TYPE: ABNORMAL
EKG ATRIAL RATE: 84 BPM
EKG P AXIS: 58 DEGREES
EKG P-R INTERVAL: 156 MS
EKG Q-T INTERVAL: 416 MS
EKG QRS DURATION: 138 MS
EKG QTC CALCULATION (BAZETT): 491 MS
EKG R AXIS: -9 DEGREES
EKG T AXIS: 125 DEGREES
EKG VENTRICULAR RATE: 84 BPM
EOSINOPHILS RELATIVE PERCENT: 0 % (ref 1–4)
ESTIMATED AVERAGE GLUCOSE: 140 MG/DL
GFR AFRICAN AMERICAN: >60 ML/MIN
GFR NON-AFRICAN AMERICAN: >60 ML/MIN
GFR SERPL CREATININE-BSD FRML MDRD: ABNORMAL ML/MIN/{1.73_M2}
GFR SERPL CREATININE-BSD FRML MDRD: ABNORMAL ML/MIN/{1.73_M2}
GLUCOSE BLD-MCNC: 134 MG/DL (ref 74–100)
GLUCOSE BLD-MCNC: 180 MG/DL (ref 70–99)
GLUCOSE BLD-MCNC: 201 MG/DL (ref 74–100)
GLUCOSE URINE: NEGATIVE
HBA1C MFR BLD: 6.5 % (ref 4.8–5.9)
HCT VFR BLD CALC: 40.4 % (ref 36.3–47.1)
HEMOGLOBIN: 13.1 G/DL (ref 11.9–15.1)
IMMATURE GRANULOCYTES: 0 %
KETONES, URINE: NEGATIVE
LEUKOCYTE ESTERASE, URINE: NEGATIVE
LYMPHOCYTES # BLD: 7 % (ref 24–43)
MAGNESIUM: 2 MG/DL (ref 1.6–2.6)
MCH RBC QN AUTO: 30 PG (ref 25.2–33.5)
MCHC RBC AUTO-ENTMCNC: 32.4 G/DL (ref 28.4–34.8)
MCV RBC AUTO: 92.4 FL (ref 82.6–102.9)
MONOCYTES # BLD: 0 % (ref 3–12)
MORPHOLOGY: NORMAL
NITRITE, URINE: NEGATIVE
NRBC AUTOMATED: 0 PER 100 WBC
PDW BLD-RTO: 13.3 % (ref 11.8–14.4)
PH UA: 6.5 (ref 5–9)
PLATELET # BLD: 213 K/UL (ref 138–453)
PLATELET ESTIMATE: ABNORMAL
PMV BLD AUTO: 10 FL (ref 8.1–13.5)
POTASSIUM SERPL-SCNC: 3.7 MMOL/L (ref 3.7–5.3)
PROTEIN UA: NEGATIVE
RBC # BLD: 4.37 M/UL (ref 3.95–5.11)
RBC # BLD: ABNORMAL 10*6/UL
SEG NEUTROPHILS: 93 % (ref 36–65)
SEGMENTED NEUTROPHILS ABSOLUTE COUNT: 8.46 K/UL (ref 1.5–8.1)
SODIUM BLD-SCNC: 135 MMOL/L (ref 135–144)
SPECIFIC GRAVITY UA: <1.005 (ref 1.01–1.02)
TURBIDITY: CLEAR
URINE HGB: NEGATIVE
UROBILINOGEN, URINE: NORMAL
WBC # BLD: 9.1 K/UL (ref 3.5–11.3)
WBC # BLD: ABNORMAL 10*3/UL

## 2020-01-19 PROCEDURE — 94664 DEMO&/EVAL PT USE INHALER: CPT

## 2020-01-19 PROCEDURE — 6370000000 HC RX 637 (ALT 250 FOR IP): Performed by: FAMILY MEDICINE

## 2020-01-19 PROCEDURE — 83036 HEMOGLOBIN GLYCOSYLATED A1C: CPT

## 2020-01-19 PROCEDURE — 81003 URINALYSIS AUTO W/O SCOPE: CPT

## 2020-01-19 PROCEDURE — 6360000004 HC RX CONTRAST MEDICATION: Performed by: FAMILY MEDICINE

## 2020-01-19 PROCEDURE — 2580000003 HC RX 258: Performed by: FAMILY MEDICINE

## 2020-01-19 PROCEDURE — 94640 AIRWAY INHALATION TREATMENT: CPT

## 2020-01-19 PROCEDURE — 94760 N-INVAS EAR/PLS OXIMETRY 1: CPT

## 2020-01-19 PROCEDURE — 36415 COLL VENOUS BLD VENIPUNCTURE: CPT

## 2020-01-19 PROCEDURE — 92610 EVALUATE SWALLOWING FUNCTION: CPT

## 2020-01-19 PROCEDURE — 94669 MECHANICAL CHEST WALL OSCILL: CPT

## 2020-01-19 PROCEDURE — 2700000000 HC OXYGEN THERAPY PER DAY

## 2020-01-19 PROCEDURE — 93010 ELECTROCARDIOGRAM REPORT: CPT | Performed by: INTERNAL MEDICINE

## 2020-01-19 PROCEDURE — 94667 MNPJ CHEST WALL 1ST: CPT

## 2020-01-19 PROCEDURE — 80048 BASIC METABOLIC PNL TOTAL CA: CPT

## 2020-01-19 PROCEDURE — 6360000002 HC RX W HCPCS: Performed by: FAMILY MEDICINE

## 2020-01-19 PROCEDURE — 82947 ASSAY GLUCOSE BLOOD QUANT: CPT

## 2020-01-19 PROCEDURE — 94761 N-INVAS EAR/PLS OXIMETRY MLT: CPT

## 2020-01-19 PROCEDURE — 71260 CT THORAX DX C+: CPT

## 2020-01-19 PROCEDURE — 1200000000 HC SEMI PRIVATE

## 2020-01-19 PROCEDURE — 85025 COMPLETE CBC W/AUTO DIFF WBC: CPT

## 2020-01-19 PROCEDURE — 97166 OT EVAL MOD COMPLEX 45 MIN: CPT

## 2020-01-19 RX ORDER — ATORVASTATIN CALCIUM 40 MG/1
40 TABLET, FILM COATED ORAL NIGHTLY
Status: DISCONTINUED | OUTPATIENT
Start: 2020-01-19 | End: 2020-01-21 | Stop reason: HOSPADM

## 2020-01-19 RX ORDER — DEXTROSE MONOHYDRATE 50 MG/ML
100 INJECTION, SOLUTION INTRAVENOUS PRN
Status: DISCONTINUED | OUTPATIENT
Start: 2020-01-19 | End: 2020-01-21 | Stop reason: HOSPADM

## 2020-01-19 RX ORDER — CETIRIZINE HYDROCHLORIDE 10 MG/1
5 TABLET ORAL DAILY
Status: DISCONTINUED | OUTPATIENT
Start: 2020-01-19 | End: 2020-01-21 | Stop reason: HOSPADM

## 2020-01-19 RX ORDER — METOPROLOL SUCCINATE 25 MG/1
12.5 TABLET, EXTENDED RELEASE ORAL DAILY
Status: DISCONTINUED | OUTPATIENT
Start: 2020-01-19 | End: 2020-01-21 | Stop reason: HOSPADM

## 2020-01-19 RX ORDER — ALBUTEROL SULFATE 2.5 MG/3ML
2.5 SOLUTION RESPIRATORY (INHALATION) EVERY 4 HOURS PRN
Status: DISCONTINUED | OUTPATIENT
Start: 2020-01-19 | End: 2020-01-21 | Stop reason: HOSPADM

## 2020-01-19 RX ORDER — DILTIAZEM HYDROCHLORIDE 120 MG/1
240 CAPSULE, COATED, EXTENDED RELEASE ORAL DAILY
Status: DISCONTINUED | OUTPATIENT
Start: 2020-01-19 | End: 2020-01-21 | Stop reason: HOSPADM

## 2020-01-19 RX ORDER — NICOTINE POLACRILEX 4 MG
15 LOZENGE BUCCAL PRN
Status: DISCONTINUED | OUTPATIENT
Start: 2020-01-19 | End: 2020-01-21 | Stop reason: HOSPADM

## 2020-01-19 RX ORDER — DEXTROSE MONOHYDRATE 25 G/50ML
12.5 INJECTION, SOLUTION INTRAVENOUS PRN
Status: DISCONTINUED | OUTPATIENT
Start: 2020-01-19 | End: 2020-01-21 | Stop reason: HOSPADM

## 2020-01-19 RX ORDER — FUROSEMIDE 20 MG/1
20 TABLET ORAL 2 TIMES DAILY
Status: DISCONTINUED | OUTPATIENT
Start: 2020-01-19 | End: 2020-01-21 | Stop reason: HOSPADM

## 2020-01-19 RX ORDER — PANTOPRAZOLE SODIUM 40 MG/1
40 TABLET, DELAYED RELEASE ORAL 2 TIMES DAILY
Status: DISCONTINUED | OUTPATIENT
Start: 2020-01-19 | End: 2020-01-21 | Stop reason: HOSPADM

## 2020-01-19 RX ORDER — IPRATROPIUM BROMIDE AND ALBUTEROL SULFATE 2.5; .5 MG/3ML; MG/3ML
1 SOLUTION RESPIRATORY (INHALATION) 4 TIMES DAILY
Status: DISCONTINUED | OUTPATIENT
Start: 2020-01-19 | End: 2020-01-21 | Stop reason: HOSPADM

## 2020-01-19 RX ORDER — UREA 10 %
10 LOTION (ML) TOPICAL NIGHTLY
Status: DISCONTINUED | OUTPATIENT
Start: 2020-01-19 | End: 2020-01-20 | Stop reason: SDUPTHER

## 2020-01-19 RX ADMIN — DILTIAZEM HYDROCHLORIDE 240 MG: 120 CAPSULE, COATED, EXTENDED RELEASE ORAL at 12:04

## 2020-01-19 RX ADMIN — PANTOPRAZOLE SODIUM 40 MG: 40 TABLET, DELAYED RELEASE ORAL at 20:57

## 2020-01-19 RX ADMIN — INSULIN LISPRO 2 UNITS: 100 INJECTION, SOLUTION INTRAVENOUS; SUBCUTANEOUS at 16:25

## 2020-01-19 RX ADMIN — ASPIRIN 325 MG: 325 TABLET, COATED ORAL at 12:04

## 2020-01-19 RX ADMIN — IPRATROPIUM BROMIDE AND ALBUTEROL SULFATE 1 AMPULE: .5; 3 SOLUTION RESPIRATORY (INHALATION) at 15:26

## 2020-01-19 RX ADMIN — IOPAMIDOL 75 ML: 755 INJECTION, SOLUTION INTRAVENOUS at 12:38

## 2020-01-19 RX ADMIN — METOPROLOL SUCCINATE 12.5 MG: 25 TABLET, EXTENDED RELEASE ORAL at 12:04

## 2020-01-19 RX ADMIN — FAMOTIDINE 20 MG: 20 TABLET ORAL at 08:10

## 2020-01-19 RX ADMIN — IPRATROPIUM BROMIDE AND ALBUTEROL SULFATE 1 AMPULE: .5; 3 SOLUTION RESPIRATORY (INHALATION) at 20:50

## 2020-01-19 RX ADMIN — IPRATROPIUM BROMIDE AND ALBUTEROL SULFATE 1 AMPULE: .5; 3 SOLUTION RESPIRATORY (INHALATION) at 10:00

## 2020-01-19 RX ADMIN — AZITHROMYCIN MONOHYDRATE 500 MG: 500 INJECTION, POWDER, LYOPHILIZED, FOR SOLUTION INTRAVENOUS at 23:02

## 2020-01-19 RX ADMIN — SODIUM CHLORIDE: 9 INJECTION, SOLUTION INTRAVENOUS at 20:33

## 2020-01-19 RX ADMIN — ENOXAPARIN SODIUM 40 MG: 40 INJECTION SUBCUTANEOUS at 08:10

## 2020-01-19 RX ADMIN — Medication 10 MG: at 20:56

## 2020-01-19 RX ADMIN — PANTOPRAZOLE SODIUM 40 MG: 40 TABLET, DELAYED RELEASE ORAL at 12:04

## 2020-01-19 RX ADMIN — FUROSEMIDE 20 MG: 20 TABLET ORAL at 16:25

## 2020-01-19 RX ADMIN — CETIRIZINE HYDROCHLORIDE 5 MG: 10 TABLET, FILM COATED ORAL at 12:04

## 2020-01-19 RX ADMIN — IPRATROPIUM BROMIDE AND ALBUTEROL SULFATE 1 AMPULE: .5; 3 SOLUTION RESPIRATORY (INHALATION) at 06:19

## 2020-01-19 RX ADMIN — CEFTRIAXONE 1 G: 1 INJECTION, POWDER, FOR SOLUTION INTRAMUSCULAR; INTRAVENOUS at 23:02

## 2020-01-19 RX ADMIN — ATORVASTATIN CALCIUM 40 MG: 40 TABLET, FILM COATED ORAL at 20:57

## 2020-01-19 NOTE — PROGRESS NOTES
Speech Language Pathology  Facility/Department: [facility] MED SURG   CLINICAL BEDSIDE SWALLOW EVALUATION    NAME: Stacy Rivera  : 1950  MRN: [id number]    ADMISSION DATE: 2020    Visit Diagnoses       Codes    Pneumonia due to organism    -  Primary J18.9    Acute pulmonary edema ([facility] [address]     J81.0    Acute respiratory failure with hypoxia ([address]     J96.01               Past Medical History:  has a past medical history of Allergic rhinitis, Arrhythmia, Atrial fibrillation ([address] Diabetes mellitus ([address] Facial weakness, H/O cardiovascular stress test, H/O echocardiogram, H/O echocardiogram, History of Holter monitoring, Hypertension, Psoriasis, S/P ablation of atrial flutter, and Trochanteric bursitis. Past Surgical History:  has a past surgical history that includes Hysterectomy (); Toe Surgery (10 - 15 years ago); Umbilical hernia repair (); hernia repair; Appendectomy ('S); Ankle surgery (Right, 2017); Ankle fracture surgery (Right, 2017); Ankle fracture surgery (Right, 2017); and Pacemaker insertion (2018). Recent Chest Xray/CT of Chest: (20 Date 20 )      Date of Eval: 2020  Evaluating Therapist: Dylan Linton    Current Diet level:  Current Diet : Regular  Current Liquid Diet : Thin    Primary Complaint:   Patient Complaint: Pt had no compliants today        Pain:   Pain Assessment  Pain Level: 0  Pain Type: Acute pain  Pain Location: Rib cage(related to coughing)    Reason for Referral  Stacy Rivera was referred for a bedside swallow evaluation to assess the efficiency of her swallow function, identify signs and symptoms of aspiration, and make recommendations regarding safe dietary consistencies, effective compensatory strategies, and safe eating environment.     Impression  Dysphagia Diagnosis  Dysphagia Diagnosis: Swallow function appears grossly intact  Dysphagia Impression : Pt did not have any distress or difficulty when chewing and swallowing a peanut butter cracker or when drinking apple juice through a straw. No coughing or different tones to voice due to residue. Dysphagia Outcome Severity Scale: Level 7: Normal in all situations    Treatment Plan  Requires SLP Intervention: No     Duration/Frequency of Treatment: No treatment required         Recommended Diet and Intervention  Solid:  all food  Liquid:  thin with or without straw  Medication: with liquid       Compensatory Swallowing Strategies Attempted   No compensatory stratagies required      Treatment/Goals  Long-term Goals  Timeframe for Long-term Goals: Therapy is not required at this time    General  Chart Reviewed: Yes  Comments: current diet is all food consistencies and thin liquid w/wout straw  Subjective: Pt was sitting up in chair alert  Behavior/Cognition  Behavior/Cognition: Alert; Cooperative;Pleasant mood  Respiratory Status: Room air  Breath Sounds: Clear  O2 Device: None (Room air)  Communication Observation: Functional  Follows Directions: Simple  Dentition: Some missing teeth  Patient Positioning: Upright in chair  Baseline Vocal Quality: Normal  Volitional Cough: Absent  Prior Dysphagia History: Pt has no prior issues with dyphagia, pt reported that may have some difficulty if drinking too quickly due to having a hiatel hernia, but is not a significant issue  Consistencies Administered: Reg solid; Thin - straw         Vision/Hearing  Vision  Vision: Impaired  Vision Exceptions: Wears glasses at all times  Hearing  Hearing: Within functional limits    Oral Motor Deficits  Oral/Motor  Oral Motor:  Within functional limits    Oral Phase Dysfunction  Oral Phase  Oral Phase: WNL  Oral Phase  Oral Phase - Comment: Pt ate cracker without any difficulty chewing     Indicators of Pharyngeal Phase Dysfunction   Pharyngeal Phase  Pharyngeal Phase: WNL  Pharyngeal Phase   Pharyngeal: Pt ate cracker without any issues chewing and preparing to swallow food    Prognosis  Prognosis  Prognosis for safe diet advancement: excellent  Barriers/Prognosis Comment: No barriers at this time  Individuals consulted  Consulted and agree with results and recommendations: Patient    Education   Discussion of concerns with pt and no further education was required. Therapy Time  SLP Individual Minutes  Time In: 4437  Time Out: 7867  Minutes: 30     SLP Total Treatment Time  Timed Code Treatment Minutes: 30 Minutes  Pt was sitting upright in chair was alert and able to answer questions about health and eating habits. Pt has been on a regular diet with thin liquid without any difficulties. Pt ate a peanut butter cracker, and drank apple juice with a straw. No distress was noted, and no difficulties noted in any of the oral phrases of chewing and swallowing. No wet vocal cords or coughing after pt ate/drank the items provided. No therapy is needed at this time as pt is able to eat and drink normally without any concerns.     Best Alonzo M.S., CCC-SLP  1/19/2020 11:59 AM

## 2020-01-19 NOTE — PROGRESS NOTES
pulm. disease 0   Chest X-Ray (CXR)/CT Scan []  Clear or not applicable []  Not available []  Atelect- asis or pleural effusions [x]  Localized infiltrate or pulm. edema []  Con-solidated Infiltrates, bilateral, or in more than 1 lobe 3   Slow or Forced VC, FEV1 OR PEFR (PULM FXN)  [x]  80% or greater, or not indicated []  Pt. unable to perform []  FEV1 or PEFR or VC 51-79%. []  FEV1 or PEFR or VC  30-49%   []  FEV1 or PEFR or VC less than 30%   0   TOTAL ACUITY: 12       CARE PLAN    If Acuity Level is 2, 3, or 4 in any of the following:    [x] BILATERAL BREATH SOUNDS (BBS)     [x] PULMONARY HISTORY (PULM HX)  [] PULMONARY FUNCTION (PULM FX)    Goal: Improve respiratory functions in patients with airway disease and decrease WOB    [x] AEROSOL PROTOCOL    Total Acuity:   16-32  []  Secondary Assessment in 24 hrs Total Acuity:  9-15  [x]  Secondary Assessment in 24 hrs Total Acuity:  4-8  []  Secondary Assessment in 48 hrs Total Acuity:  0-3  []  Secondary Assessment in 72 hrs   HHN AEROSOL THERAPY with  [physician-ordered bronchodilator(s)] q 4 & Albuterol PRN q2 hrs. Breath-Actuated Neb if BBS Acuity = 4, and pt. can use MP. Notify physician if condition deteriorates. HHN AEROSOL THERAPY with  [physician-ordered bronchodilator(s)]  QID and Albuterol PRN q4 hrs. Breath-Actuated Neb if BBS Acuity = 4, and pt. can use MP. Notify physician if condition deteriorates. MDI THERAPY with  2 actuations of [physician-ordered bronchodilator(s)] via spacer TID Albuterol and PRNq4 hrs. If unable to utilize MDI: HHN [physician-ordered bronchodilator(s)] TID and Albuterol PRN q4 hrs. Notify physician if condition deteriorates. MDI THERAPY with  [physician-ordered bronchodilator(s)] via spacer TID PRN. If unable to utilize MDI: HHN [physician-ordered bronchodilator(s)] TID PRN. Notify physician if condition deteriorates.          If Acuity Level is 2, 3, or 4 in any of the following:    [] COUGH     [] SURGICAL HISTORY (SURG HX)  [x] CHEST XRAY (CXR)    Goal: Improvement in sputum mobilization in patients with ineffective airway clearance. Reverse atelectasis. [x] Bronchopulmonary Hygiene Protocol    Total Acuity:   16-32  []  Secondary Assessment in 24 hrs Total Acuity:  9-15  [x]  Secondary Assessment in 24 hrs Total Acuity:  4-8  []  Secondary Assessment in 48 hrs Total Acuity:  0-3  []  Secondary Assessment in 72 hrs   METANEB QID with [physician-ordered bronchodilator(s)] if CXR Acuity = 4; otherwise:  PD&P, PEP, or Vest QID & PRN  NT Sxn PRN for ineffective cough  METANEB QID with [physician-ordered bronchodilator(s)] if CXR Acuity = 4; otherwise:  PD&P, PEP, or Vest TID & PRN  NT Sxn PRN for ineffective cough  Instruct patient to self-perform IS q1hr WA   Directed Cough self-performed q1hr WA     If Acuity Level is 2 or above in the following:    [] PULMONARY HISTORY (PULM HX)    Goal: Assist patient in quitting smoking to slow or stop the progression of lung disease.     [] Smoking Cessation Protocol    SMOKING CESSATION EDUCATION provided according to policy JM_815: (emeterio with an X)  ____Yes    ____ No     ____ NA    Smoking Cessation Booklet given:  ____Yes  ____No ____Patient Jenae Ibarra

## 2020-01-19 NOTE — ED PROVIDER NOTES
beats T HR of 114 bpm. Episode of high degree AV block, maximum R-R interval 4.29 seconds during daytime hours associated w/ dizziness.  Hypertension 2011    Psoriasis     S/P ablation of atrial flutter 02/21/2017    Dr. Abraham Rios Trochanteric bursitis 2013    right       SURGICAL HISTORY       Past Surgical History:   Procedure Laterality Date    ANKLE FRACTURE SURGERY Right 02/25/2017    ANKLE FRACTURE SURGERY Right 2/25/2017    ANKLE OPEN REDUCTION INTERNAL FIXATION-PARAGON. APPLCATION OF A SPLINT performed by Lavell Rhoades DPM at [address] Right 02/25/2017    ORIF    APPENDECTOMY  1970'S    HERNIA REPAIR      ventral    HYSTERECTOMY  1978    STILL HAS OVARIES    PACEMAKER INSERTION  09/14/2018    TOE SURGERY  10 - 15 years ago    5th left toe    UMBILICAL HERNIA REPAIR  03/12       CURRENT MEDICATIONS       Previous Medications    ASPIRIN BUF,[id number], (BUFFERED ASPIRIN) 325 MG TABS    Take 1 tablet by mouth daily    ATORVASTATIN (LIPITOR) 40 MG TABLET    Take 40 mg by mouth nightly     CALAMINE LOTION    Apply topically as needed Apply topically as needed. DILTIAZEM (DILACOR XR) 240 MG EXTENDED RELEASE CAPSULE    Take 240 mg by mouth daily    FUROSEMIDE (LASIX) 20 MG TABLET    Take 1 tablet by mouth 2 times daily    LORATADINE (CLARITIN) 10 MG CAPSULE    Take 10 mg by mouth daily    MELATONIN 10 MG TABS    Take by mouth nightly    METFORMIN (GLUCOPHAGE) 500 MG TABLET    Take 500 mg by mouth 2 times daily (with meals)    METOPROLOL SUCCINATE (TOPROL XL) 25 MG EXTENDED RELEASE TABLET    Take 0.5 tablets by mouth daily    MULTIPLE VITAMIN (MULTIVITAMIN PO)    Take  by mouth. PANTOPRAZOLE (PROTONIX) 40 MG TABLET    Take 40 mg by mouth       ALLERGIES     Patient has no known allergies.     FAMILY HISTORY       Family History   Problem Relation Age of Onset    Heart Failure Mother     Hypertension Father         SOCIAL HISTORY       Social History Height Weight   131/85 98 °F (36.7 °C) Oral 97 20 (!) 88 % -- --       Physical Exam  Vitals signs and nursing note reviewed. Constitutional:       General: She is not in acute distress. Appearance: She is well-developed. HENT:      Head: Normocephalic and atraumatic. Eyes:      General:         Right eye: No discharge. Left eye: No discharge. Conjunctiva/sclera: Conjunctivae normal.   Neck:      Musculoskeletal: Neck supple. Cardiovascular:      Rate and Rhythm: Normal rate and regular rhythm. Pulmonary:      Effort: Pulmonary effort is normal. No respiratory distress. Breath sounds: No stridor. Wheezing present. Abdominal:      General: There is no distension. Palpations: Abdomen is soft. Tenderness: There is no tenderness. Musculoskeletal:         General: No tenderness. Skin:     General: Skin is warm and dry. Findings: No erythema. Neurological:      Mental Status: She is alert and oriented to person, place, and time. DIAGNOSTIC RESULTS     RADIOLOGY: (none if blank)   Interpretation per theRadiologist below, if available at the time of this note:    XR CHEST PORTABLE   Final Result   New prominent markings throughout the chest bilaterally suggesting pulmonary   edema. More focal increased density left lateral lung base is noted.   This may   represent atelectasis or pneumonia with a small effusion             LABS:  Labs Reviewed   CBC WITH AUTO DIFFERENTIAL - Abnormal; Notable for the following components:       Result Value    Lymphocytes 22 (*)     Monocytes 13 (*)     All other components within normal limits   COMPREHENSIVE METABOLIC PANEL W/ REFLEX TO MG FOR LOW K - Abnormal; Notable for the following components:    Glucose 101 (*)     CREATININE 0.99 (*)     Potassium 3.1 (*)     Chloride 95 (*)     Total Bilirubin 0.26 (*)     GFR Non- 56 (*)     All other components within normal limits   D-DIMER, QUANTITATIVE - Abnormal; Notable for the following components:    D-Dimer, Quant 0.55 (*)     All other components within normal limits   RAPID INFLUENZA A/B ANTIGENS   URINE CULTURE   CULTURE BLOOD #1   CULTURE BLOOD #2   LACTATE, SEPSIS   PROTIME-INR   LIPASE   TROPONIN   BRAIN NATRIURETIC PEPTIDE   URINALYSIS   LACTATE, SEPSIS   MAGNESIUM       All other labs were within normal range or not returned as of this dictation. EMERGENCY DEPARTMENT COURSE and Medical Decision Making: On evaluation, patient has oxygen saturation of 87% on room air. Patient is requiring 2 L to stay above 90%. Patient does have wheezing throughout. Breathing treatments Solu-Medrol was given with patient's long smoking history. EKG shows normal sinus rhythm with left bundle branch block consistent with prior studies. CBC is essentially unremarkable. CMP is essentially unremarkable. D-dimer is normal when age-adjusted which makes pulmonary embolism unlikely cause of symptoms. Influenza swabs are negative. Chest x-ray was read by the radiologist and shows prominent markings throughout the chest bilaterally suggesting pulmonary edema. More focal increased density left lateral lung base noted. This may represent atelectasis versus pneumonia with small effusion. With patient's symptoms of fever and productive cough along with x-ray findings, patient was started on Rocephin for treatment of pneumonia. BNP is normal.  Patient has not had echo in approximately 1.5 years. Patient may benefit from echo as well to assess for change to ejection fraction with increased pulmonary edema on x-ray. Due to need for supplemental oxygen and pneumonia, patient would benefit from admission. Patient was discussed with Dr. Nicole Valero, who did agree to admit the patient. Patient is in agreement with this plan.     ED Medications administered this visit:    Medications   sodium chloride flush 0.9 % injection 10 mL (10 mLs Intravenous Not Given 1/18/20 2250)   sodium chloride flush 0.9 % injection 10 mL (has no administration in time range)   ipratropium-albuterol (DUONEB) nebulizer solution 1 ampule (1 ampule Inhalation Given 1/18/20 2144)   methylPREDNISolone sodium (SOLU-MEDROL) injection 125 mg (125 mg Intravenous Given 1/18/20 2138)   cefTRIAXone (ROCEPHIN) 1 g in sterile water 10 mL IV syringe (0 g Intravenous Stopped 1/18/20 2256)       CLINICAL IMPRESSION      1. Pneumonia due to organism    2. Acute pulmonary edema (HCC)    3. Acute respiratory failure with hypoxia [facility])          DISPOSITION/PLAN   DISPOSITION Decision To Admit 01/18/2020 10:39:25 PM      PATIENT REFERRED TO:  No follow-up provider specified.     DISCHARGE MEDICATIONS:  New Prescriptions    No medications on file            Chadwick Varela MD (electronically signed)  AttendingEmergency Department Provider            Chadwick Varela MD  01/18/20 1031

## 2020-01-19 NOTE — PLAN OF CARE
Problem: Discharge Planning:  Goal: Discharged to appropriate level of care  Description  Discharged to appropriate level of care  Outcome: Ongoing  Note:   Plan is discharge to home when able. Goal: Participates in care planning  Description  Participates in care planning  Outcome: Ongoing     Problem: Airway Clearance - Ineffective:  Goal: Clear lung sounds  Description  Clear lung sounds  Outcome: Ongoing  Note:   Patient has expiratory wheezes throughout. Goal: Ability to maintain a clear airway will improve  Description  Ability to maintain a clear airway will improve  Outcome: Ongoing     Problem: Fluid Volume - Deficit:  Goal: Achieves intake and output within specified parameters  Description  Achieves intake and output within specified parameters  Outcome: Ongoing  Note:   NS infusing at 50 mL/hr. Tolerating oral fluids well. Problem: Gas Exchange - Impaired:  Goal: Levels of oxygenation will improve  Description  Levels of oxygenation will improve  Outcome: Ongoing  Note:   Pulse oximetry is 92% or higher on 2 liters. Problem: Hyperthermia:  Goal: Ability to maintain a body temperature in the normal range will improve  Description  Ability to maintain a body temperature in the normal range will improve  Outcome: Ongoing  Note:   Patient is afebrile.       Problem: Tobacco Use:  Goal: Will participate in inpatient tobacco-use cessation counseling  Description  Will participate in inpatient tobacco-use cessation counseling  Outcome: Ongoing

## 2020-01-19 NOTE — PROGRESS NOTES
Occupational Therapy   Occupational Therapy Initial Assessment  Date: 2020   Patient Name: Heena Rod  MRN: [id number]     : 1950    Date of Service: 2020    Discharge Recommendations:          Assessment   Performance deficits / Impairments: Decreased safe awareness;Decreased endurance;Decreased high-level IADLs;Decreased strength;Decreased ADL status; Decreased balance  Assessment: 82 y.o female presents with decreased functional activity tolerance, decreased knowledge/use of energy conservation strategies, and decreased safety awareness limiting ADL/IADL independence and safety. Pt would benefit from skilled tx for education/training on use of energy conservation strategies, compensatory strategies, and safety awareness to return to OF for ADL/IADL performance with decreased risk of re-hospitalization. Treatment Diagnosis: pneumonia  Prognosis: Good  Decision Making: Medium Complexity  OT Education: OT BUFFY;JUSNVV Conservation;Plan of Care  REQUIRES OT FOLLOW UP: Yes  Activity Tolerance  Activity Tolerance: Patient Tolerated treatment well  Safety Devices  Safety Devices in place: Yes  Type of devices: Nurse notified; All fall risk precautions in place;Call light within reach           Patient Diagnosis(es): The primary encounter diagnosis was Pneumonia due to organism. Diagnoses of Acute pulmonary edema (HCC) and Acute respiratory failure with hypoxia (HCC) were also pertinent to this visit. has a past medical history of Allergic rhinitis, Arrhythmia, Atrial fibrillation ([address] Diabetes mellitus ([address] Facial weakness, H/O cardiovascular stress test, H/O echocardiogram, H/O echocardiogram, History of Holter monitoring, Hypertension, Psoriasis, S/P ablation of atrial flutter, and Trochanteric bursitis. has a past surgical history that includes Hysterectomy (); Toe Surgery (10 - 15 years ago); Umbilical hernia repair (); hernia repair; Appendectomy ();  Ankle surgery (Right, 02/25/2017); Ankle fracture surgery (Right, 02/25/2017); Ankle fracture surgery (Right, 2/25/2017); and Pacemaker insertion (09/14/2018). Treatment Diagnosis: pneumonia      Restrictions       Subjective   General  Chart Reviewed: Yes  Patient assessed for rehabilitation services?: Yes  Diagnosis: Pneumonia  Subjective  Subjective: 82 y.o female presented to ED due to SOB, fatigue, fever, and cough x 1 week. Pt demo low O2 sat at 87% in ED with CXR showing pneumonia. General Comment  Comments: Pt denies pain. Vital Signs  Pulse: 90  Heart Rate Source: Monitor  Oxygen Therapy  SpO2: 92 %(Pt at 92% at rest with drop to 90% with functional mobility to bathroom.)  Pulse Oximeter Device Mode: Continuous  Pulse Oximeter Device Location: Finger  O2 Device: (Room air)  O2 Flow Rate (L/min): 1 L/min  Social/Functional History  Social/Functional History  Lives With: Spouse  Type of Home: House  Home Layout: One level  Home Access: Ramped entrance  Bathroom Shower/Tub: Tub/Shower unit  Bathroom Toilet: Standard  Bathroom Equipment: Grab bars in shower, Hand-held shower  Bathroom Accessibility: Accessible  Home Equipment: Rolling walker  ADL Assistance: Independent  Homemaking Assistance: Independent  Homemaking Responsibilities: Yes(Pt reports completing all IADL's independently including pet care for cat and dog. 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 IADL Comments: Pt has one dog and one cat to care for at home. Objective        Orientation  Overall Orientation Status: Within Normal Limits  Observation/Palpation  Posture: Good  Observation: Pt demonstrates O2 sat of 92% at rest and drop to 90% with activity.  Pt on room air throughout evaluation with O2 maintained >90% with light hygeine/grooming at sink.  Balance  Sitting Balance: Modified independent   Standing Balance: Supervision  Functional Mobility  Functional - Mobility Device: No device  Activity: Transport items; To/from bathroom  Assist Level: Supervision  Toilet Transfers  Toilet - Technique: Stand pivot  Equipment Used: Standard toilet  Toilet Transfer: Modified independent  ADL  Feeding: Independent  Grooming: Supervision  UE Bathing: Supervision  LE Bathing: Supervision  UE Dressing: Supervision  LE Dressing: Stand by assistance  Toileting: Modified independent   Additional Comments: Pt requires verbal cues for safety awareness and use of energy conservation strategies during ADL's to maintain O2 >90%. Cognition  Overall Cognitive Status: WFL                 LUE AROM (degrees)  LUE AROM : WFL  RUE AROM (degrees)  RUE AROM : WFL  LUE Strength  Gross LUE Strength: Exceptions to [facility] [id number]  L Hand General: 4-/5  LUE Strength Comment: 4-/5 general BUE strength  RUE Strength  Gross RUE Strength: Exceptions to [facility] PEMBRO  R Hand General: 4-/5  RUE Strength Comment: 4-/5 general BUE strength                   Plan   Plan  Times per week: 7x/wk  Times per day: Daily    G-Code     OutComes Score    AM-PAC Score        AM-[facility] Inpatient Daily Activity Raw Score: 21 (01/19/20 1058)  AM-PAC Inpatient ADL T-Scale Score : 44.27 (01/19/20 1058)  ADL Inpatient CMS 0-100% Score: 32.79 (01/19/20 1058)  ADL Inpatient CMS G-Code Modifier : CJ (01/19/20 1058)    Goals  Short term goals  Time Frame for Short term goals: 10 days  Short term goal 1: Pt will complete bathing, dressing, and grooming routine at [facility] with mod I demonstrating good use of energy conservation strategies to increase ADL independence. Short term goal 2: Pt will tolerate 15 minutes of BUE ther ex/ther act with decreased SOB and O2 maintained >90% to increase overall functional activity tolerance and BUE strength.    Short term goal 3: Pt will demonstrate improved use of energy conservation

## 2020-01-19 NOTE — PROGRESS NOTES
Patient arrived to floor per cart from ED to room 301. Patient ambulated to bed with no issues. Patient was placed on 3L O2 via NC, patient does not wear O2 at home. Assessment and vitals completed as documented at this time. Patient denies any chest pain/ SOB at this time. Patient does have expiratory wheezes/rhonchi throughtout.

## 2020-01-19 NOTE — ED NOTES
Oxygen applied per nasal cannula 2 liters for oxygen sat of 88%     Gudelia Kearns RN  01/18/20 2052

## 2020-01-19 NOTE — H&P
supraventricular tachycardia ([address]    Essential hypertension    H/O atrial flutter    Type 2 diabetes mellitus without complication, without long-term current use of insulin ([address]  Resolved Problems:    * No resolved hospital problems. *      Patient Active Problem List    Diagnosis Date Noted    H/O atrial flutter 01/19/2020    Type 2 diabetes mellitus without complication, without long-term current use of insulin ([address] 01/19/2020    Pneumonia of left lower lobe due to infectious organism ([address] 01/18/2020    Hypotension 02/07/2019    SVT (supraventricular tachycardia) ([address] 09/14/2018    High-grade atrioventricular block     Blepharospasm 07/17/2018    Clonic hemifacial spasm 07/17/2018    Closed bimalleolar fracture of right ankle with routine healing 02/22/2017    Wide-complex tachycardia ([address] 02/05/2017    HTN (hypertension) 02/05/2017    Demand ischemia ([address] 02/05/2017    Leucocytosis 02/05/2017    Hypokalemia 02/05/2017    Paroxysmal supraventricular tachycardia (HCC)     Left bundle branch block     Essential hypertension     Dyslipidemia     H/O cardiac murmur     Occult GI bleeding 03/26/2016    Incisional hernia, without obstruction or gangrene 06/17/2013    Bronchitis 40/65/4914    Umbilical hernia 96/25/1153       Plan:     · This patient requires inpatient admission because of pneumonia requiring iv antibiotics,aeosols  · Factors affecting the medical complexity of this patient include elevated d dimer,htn,type 2 dm  · Estimated length of stay is 2 days    · High risk medication monitoring: none    CORE MEASURES  DVT prophylaxis: Lovenox  Decubitus ulcer present on admission: No  CODE STATUS: FULL CODE  Nutrition Status: good   Physical therapy: Yes   Old Charts reviewed: Yes  EKG Reviewed:  Yes  Advance Directive Addressed: Yes  Total time spent in evaluating this patient and formulating plan of care 25 minutes  Mona Sewell MD  1/19/2020, 11:41 AM

## 2020-01-20 PROBLEM — J96.01 ACUTE RESPIRATORY FAILURE WITH HYPOXIA (HCC): Status: ACTIVE | Noted: 2020-01-20

## 2020-01-20 PROBLEM — E44.1 MILD MALNUTRITION (HCC): Status: ACTIVE | Noted: 2020-01-20

## 2020-01-20 PROBLEM — R91.8 PULMONARY NODULES: Status: ACTIVE | Noted: 2020-01-19

## 2020-01-20 LAB
ABSOLUTE EOS #: <0.03 K/UL (ref 0–0.44)
ABSOLUTE IMMATURE GRANULOCYTE: 0.07 K/UL (ref 0–0.3)
ABSOLUTE LYMPH #: 1.03 K/UL (ref 1.1–3.7)
ABSOLUTE MONO #: 0.85 K/UL (ref 0.1–1.2)
BASOPHILS # BLD: 0 % (ref 0–2)
BASOPHILS ABSOLUTE: <0.03 K/UL (ref 0–0.2)
CULTURE: NO GROWTH
DIFFERENTIAL TYPE: ABNORMAL
EOSINOPHILS RELATIVE PERCENT: 0 % (ref 1–4)
GLUCOSE BLD-MCNC: 106 MG/DL (ref 74–100)
GLUCOSE BLD-MCNC: 129 MG/DL (ref 74–100)
GLUCOSE BLD-MCNC: 131 MG/DL (ref 74–100)
HCT VFR BLD CALC: 36.5 % (ref 36.3–47.1)
HEMOGLOBIN: 11.8 G/DL (ref 11.9–15.1)
IMMATURE GRANULOCYTES: 1 %
LYMPHOCYTES # BLD: 9 % (ref 24–43)
Lab: NORMAL
MCH RBC QN AUTO: 30 PG (ref 25.2–33.5)
MCHC RBC AUTO-ENTMCNC: 32.3 G/DL (ref 28.4–34.8)
MCV RBC AUTO: 92.9 FL (ref 82.6–102.9)
MONOCYTES # BLD: 8 % (ref 3–12)
NRBC AUTOMATED: 0 PER 100 WBC
PDW BLD-RTO: 13.6 % (ref 11.8–14.4)
PLATELET # BLD: 223 K/UL (ref 138–453)
PLATELET ESTIMATE: ABNORMAL
PMV BLD AUTO: 10.2 FL (ref 8.1–13.5)
RBC # BLD: 3.93 M/UL (ref 3.95–5.11)
RBC # BLD: ABNORMAL 10*6/UL
SEG NEUTROPHILS: 82 % (ref 36–65)
SEGMENTED NEUTROPHILS ABSOLUTE COUNT: 8.96 K/UL (ref 1.5–8.1)
SPECIMEN DESCRIPTION: NORMAL
WBC # BLD: 10.9 K/UL (ref 3.5–11.3)
WBC # BLD: ABNORMAL 10*3/UL

## 2020-01-20 PROCEDURE — 85025 COMPLETE CBC W/AUTO DIFF WBC: CPT

## 2020-01-20 PROCEDURE — 1200000000 HC SEMI PRIVATE

## 2020-01-20 PROCEDURE — 2700000000 HC OXYGEN THERAPY PER DAY

## 2020-01-20 PROCEDURE — 99222 1ST HOSP IP/OBS MODERATE 55: CPT | Performed by: INTERNAL MEDICINE

## 2020-01-20 PROCEDURE — 6370000000 HC RX 637 (ALT 250 FOR IP): Performed by: FAMILY MEDICINE

## 2020-01-20 PROCEDURE — 6360000002 HC RX W HCPCS: Performed by: NURSE PRACTITIONER

## 2020-01-20 PROCEDURE — 2580000003 HC RX 258: Performed by: FAMILY MEDICINE

## 2020-01-20 PROCEDURE — 6360000002 HC RX W HCPCS: Performed by: FAMILY MEDICINE

## 2020-01-20 PROCEDURE — 94640 AIRWAY INHALATION TREATMENT: CPT

## 2020-01-20 PROCEDURE — 97110 THERAPEUTIC EXERCISES: CPT

## 2020-01-20 PROCEDURE — 36415 COLL VENOUS BLD VENIPUNCTURE: CPT

## 2020-01-20 PROCEDURE — 94669 MECHANICAL CHEST WALL OSCILL: CPT

## 2020-01-20 PROCEDURE — 97162 PT EVAL MOD COMPLEX 30 MIN: CPT

## 2020-01-20 PROCEDURE — 94664 DEMO&/EVAL PT USE INHALER: CPT

## 2020-01-20 PROCEDURE — 97116 GAIT TRAINING THERAPY: CPT

## 2020-01-20 PROCEDURE — 82947 ASSAY GLUCOSE BLOOD QUANT: CPT

## 2020-01-20 PROCEDURE — 97530 THERAPEUTIC ACTIVITIES: CPT

## 2020-01-20 PROCEDURE — 97535 SELF CARE MNGMENT TRAINING: CPT

## 2020-01-20 PROCEDURE — 94760 N-INVAS EAR/PLS OXIMETRY 1: CPT

## 2020-01-20 RX ORDER — LEVOFLOXACIN 5 MG/ML
750 INJECTION, SOLUTION INTRAVENOUS EVERY 24 HOURS
Status: DISCONTINUED | OUTPATIENT
Start: 2020-01-20 | End: 2020-01-21 | Stop reason: HOSPADM

## 2020-01-20 RX ORDER — FAMOTIDINE 10 MG
20 TABLET ORAL 2 TIMES DAILY
Status: DISCONTINUED | OUTPATIENT
Start: 2020-01-20 | End: 2020-01-21 | Stop reason: HOSPADM

## 2020-01-20 RX ORDER — PHENOL 1.4 %
10 AEROSOL, SPRAY (ML) MUCOUS MEMBRANE NIGHTLY
Status: DISCONTINUED | OUTPATIENT
Start: 2020-01-20 | End: 2020-01-21 | Stop reason: HOSPADM

## 2020-01-20 RX ADMIN — IPRATROPIUM BROMIDE AND ALBUTEROL SULFATE 1 AMPULE: .5; 3 SOLUTION RESPIRATORY (INHALATION) at 19:13

## 2020-01-20 RX ADMIN — ENOXAPARIN SODIUM 40 MG: 40 INJECTION SUBCUTANEOUS at 08:12

## 2020-01-20 RX ADMIN — CETIRIZINE HYDROCHLORIDE 5 MG: 10 TABLET, FILM COATED ORAL at 08:12

## 2020-01-20 RX ADMIN — PANTOPRAZOLE SODIUM 40 MG: 40 TABLET, DELAYED RELEASE ORAL at 20:01

## 2020-01-20 RX ADMIN — IPRATROPIUM BROMIDE AND ALBUTEROL SULFATE 1 AMPULE: .5; 3 SOLUTION RESPIRATORY (INHALATION) at 10:04

## 2020-01-20 RX ADMIN — FUROSEMIDE 20 MG: 20 TABLET ORAL at 08:12

## 2020-01-20 RX ADMIN — ATORVASTATIN CALCIUM 40 MG: 40 TABLET, FILM COATED ORAL at 20:01

## 2020-01-20 RX ADMIN — FUROSEMIDE 20 MG: 20 TABLET ORAL at 16:30

## 2020-01-20 RX ADMIN — METOPROLOL SUCCINATE 12.5 MG: 25 TABLET, EXTENDED RELEASE ORAL at 08:12

## 2020-01-20 RX ADMIN — IPRATROPIUM BROMIDE AND ALBUTEROL SULFATE 1 AMPULE: .5; 3 SOLUTION RESPIRATORY (INHALATION) at 15:19

## 2020-01-20 RX ADMIN — DILTIAZEM HYDROCHLORIDE 240 MG: 120 CAPSULE, COATED, EXTENDED RELEASE ORAL at 08:11

## 2020-01-20 RX ADMIN — ASPIRIN 325 MG: 325 TABLET, COATED ORAL at 08:11

## 2020-01-20 RX ADMIN — LEVOFLOXACIN 750 MG: 5 INJECTION, SOLUTION INTRAVENOUS at 10:38

## 2020-01-20 RX ADMIN — Medication 10 MG: at 20:01

## 2020-01-20 RX ADMIN — FAMOTIDINE 20 MG: 10 TABLET ORAL at 20:01

## 2020-01-20 RX ADMIN — FAMOTIDINE 20 MG: 10 TABLET ORAL at 10:36

## 2020-01-20 RX ADMIN — SODIUM CHLORIDE: 9 INJECTION, SOLUTION INTRAVENOUS at 19:51

## 2020-01-20 RX ADMIN — PANTOPRAZOLE SODIUM 40 MG: 40 TABLET, DELAYED RELEASE ORAL at 08:11

## 2020-01-20 RX ADMIN — IPRATROPIUM BROMIDE AND ALBUTEROL SULFATE 1 AMPULE: .5; 3 SOLUTION RESPIRATORY (INHALATION) at 05:35

## 2020-01-20 ASSESSMENT — PAIN SCALES - GENERAL
PAINLEVEL_OUTOF10: 0

## 2020-01-20 NOTE — PLAN OF CARE
Problem: Discharge Planning:  Goal: Discharged to appropriate level of care  Description  Discharged to appropriate level of care  Outcome: Ongoing     Problem: Airway Clearance - Ineffective:  Goal: Clear lung sounds  Description  Clear lung sounds  Outcome: Ongoing  Note:   Pt has wheezes throughout. Will continue to monitor. Problem: Airway Clearance - Ineffective:  Goal: Ability to maintain a clear airway will improve  Description  Ability to maintain a clear airway will improve  Outcome: Ongoing  Note:   Pt is able to cough and maintain airway. Problem: Fluid Volume - Deficit:  Goal: Achieves intake and output within specified parameters  Description  Achieves intake and output within specified parameters  Outcome: Ongoing  Note:   I&Os are being kept. Problem: Hyperthermia:  Goal: Ability to maintain a body temperature in the normal range will improve  Description  Ability to maintain a body temperature in the normal range will improve  Outcome: Ongoing  Note:   Pt is afebrile. Problem: Tobacco Use:  Goal: Will participate in inpatient tobacco-use cessation counseling  Description  Will participate in inpatient tobacco-use cessation counseling  Outcome: Ongoing     Problem: Gas Exchange - Impaired:  Goal: Levels of oxygenation will improve  Description  Levels of oxygenation will improve  Note:   Pt placed back on 1L due to sats in the 80s. Will continue to monitor.

## 2020-01-20 NOTE — PROGRESS NOTES
RESPIRATORY ASSESSMENT PROTOCOL                                                                                              Patient Name: Hannah Barlow Room#: [id number] : 1950     Admitting diagnosis: Pneumonia of left lower lobe due to infectious organism [facility]) [J18.1]       Medical History:   Past Medical History:   Diagnosis Date    Allergic rhinitis     Arrhythmia     Atrial fibrillation ([address]     Diabetes mellitus ([address]     Facial weakness ~     traumatic injury    H/O cardiovascular stress test 2016    Probably normal myocardial perfusion SPECT imagin. There is no evidence of ischemia. There is a fixed defect in septum of uncertain etiology but potentially due to LBBB, attenuation artifact or less likely prior infarct. Normal wall motion and wall thickening.  H/O echocardiogram 2016    H/O echocardiogram 2018    Phenotypic picture of hypertrophic cardiomyopathy with septal hyertrophy and systolic antreior motion of the mitral valve but without signifcant resting outflow tract obstruction. EF >70%. The LV wall thickness is mildly increased. Systolic anterior motion (TOBY) of the mitral valve with mild to moderate mitral regurg. Mild tricuspid regurg. Evidence of mild (grade I) diastolic dysfunction is seen.  History of Holter monitoring 08/15/2018    Baseline rhythm is sinus w/ bundle branch block. Average HR 86 bpm, ranging between  bpm. Rare PAC's and PVC's 2 atrial runs. Longest being 6 beats T HR of 114 bpm. Episode of high degree AV block, maximum R-R interval 4.29 seconds during daytime hours associated w/ dizziness.     Hypertension     Psoriasis     S/P ablation of atrial flutter 2017    [employer] Trochanteric bursitis 2013    right       PATIENT ASSESSMENT    LABORATORY DATA  Hematology:   Lab Results   Component Value Date    WBC 9.1 2020    RBC 4.37 2020    RBC 3.84 2012    HGB 13.1 2020    HCT 40.4 []  Thoracic w/ pulm. disease 0   Chest X-Ray (CXR)/CT Scan []  Clear or not applicable []  Not available []  Atelect- asis or pleural effusions [x]  Localized infiltrate or pulm. edema []  Con-solidated Infiltrates, bilateral, or in more than 1 lobe 3   Slow or Forced VC, FEV1 OR PEFR (PULM FXN)  [x]  80% or greater, or not indicated []  Pt. unable to perform []  FEV1 or PEFR or VC 51-79%. []  FEV1 or PEFR or VC  30-49%   []  FEV1 or PEFR or VC less than 30%   0   TOTAL ACUITY: 13       CARE PLAN    If Acuity Level is 2, 3, or 4 in any of the following:    [x] BILATERAL BREATH SOUNDS (BBS)     [x] PULMONARY HISTORY (PULM HX)  [] PULMONARY FUNCTION (PULM FX)    Goal: Improve respiratory functions in patients with airway disease and decrease WOB    [x] AEROSOL PROTOCOL    Total Acuity:   16-32  []  Secondary Assessment in 24 hrs Total Acuity:  9-15  [x]  Secondary Assessment in 24 hrs Total Acuity:  4-8  []  Secondary Assessment in 48 hrs Total Acuity:  0-3  []  Secondary Assessment in 72 hrs   HHN AEROSOL THERAPY with  [physician-ordered bronchodilator(s)] q 4 & Albuterol PRN q2 hrs. Breath-Actuated Neb if BBS Acuity = 4, and pt. can use MP. Notify physician if condition deteriorates. HHN AEROSOL THERAPY with  [physician-ordered bronchodilator(s)]  QID and Albuterol PRN q4 hrs. Breath-Actuated Neb if BBS Acuity = 4, and pt. can use MP. Notify physician if condition deteriorates. MDI THERAPY with  2 actuations of [physician-ordered bronchodilator(s)] via spacer TID Albuterol and PRNq4 hrs. If unable to utilize MDI: HHN [physician-ordered bronchodilator(s)] TID and Albuterol PRN q4 hrs. Notify physician if condition deteriorates. MDI THERAPY with  [physician-ordered bronchodilator(s)] via spacer TID PRN. If unable to utilize MDI: HHN [physician-ordered bronchodilator(s)] TID PRN. Notify physician if condition deteriorates.          If Acuity Level is 2, 3, or 4 in any of the following:    [] COUGH     []

## 2020-01-20 NOTE — PROGRESS NOTES
Physical Therapy  Initial Assessment  Date: 2020  Patient Name: Sonia Moore  MRN: [id number]  : 1950     Treatment Diagnosis: general weakness    Restrictions  Restrictions/Precautions  Restrictions/Precautions: General Precautions    Subjective   General  Chart Reviewed: Yes  Patient assessed for rehabilitation services?: Yes  Response To Previous Treatment: Patient with no complaints from previous session. Family / Caregiver Present: No  Referring Practitioner: Dr. Vikki Olszewski   Referral Date : 20  Diagnosis: pneumonia   Subjective  Subjective: Denies pain. reports feeling better. Pain Screening  Patient Currently in Pain: Denies       Vision/Hearing       Orientation       Social/Functional History       Objective                           Bed mobility  Comment: Pt in chair upon arrival.  Transfers  Sit to Stand: Supervision  Stand to sit: Supervision       Ambulation  Ambulation?: Yes  WB Status: unrestricted   Ambulation 1  Surface: level tile  Device: No Device(Pushed IV pole)  Other Apparatus: O2  Assistance: Stand by assistance  Quality of Gait: Slow reyna with WBOS and reciprocal pattern. Distance: 200 ft  Comments: SPO2 93% post ambulation.                             Assessment   Conditions Requiring Skilled Therapeutic Intervention  Treatment Diagnosis: general weakness  Prognosis: Good  REQUIRES PT FOLLOW UP: Yes  Activity Tolerance  Activity Tolerance: Patient Tolerated treatment well         Plan   Plan  Times per week: 7x/wk   Times per day: Twice a day  Plan weeks: 2x/daily except weekends 1x/daily   Current Treatment Recommendations: Strengthening, Gait Training, Patient/Caregiver Education & Training, Stair training, Equipment Evaluation, Education, & procurement, Balance Training, Neuromuscular Re-education, Functional Mobility Training, Endurance Training, Home Exercise Program, Transfer Training, Safety Education & Training  Safety Devices  Type of devices: Left in chair,

## 2020-01-20 NOTE — PROGRESS NOTES
[facility]  Inpatient/Observation/Outpatient Rehabilitation    Date: 2020  Patient Name: Adina Leung       [x] Inpatient Acute/Observation       []  Outpatient  : 1950       [] Pt no showed for scheduled appointment    [x] Pt refused/declined therapy at this time due to:  Sleeping and tired         [] Pt cancelled due to:  [] No Reason Given   [] Sick/ill   [] Other:    Will attempt evaluation at our earliest opportunity.        Amos Olivo PT, DPT Date: 2020

## 2020-01-20 NOTE — PROGRESS NOTES
Balance  Sitting Balance: Independent  Standing Balance: Independent  Standing Balance  Time: ~ 10 min  Activity: BUE therex  Comment: 0 LOB, G safety  Functional Mobility  Functional - Mobility Device: No device  Activity: Other  Assist Level: Independent  Functional Mobility Comments: Pt completed fxl mobility in room during transfer training 0 LOB, G safety. Pt dropped item and stooped to retrieve from floor level with 0 LOB     Transfers  Sit to stand: Independent  Stand to sit: Independent  Transfer Comments: Pt completed sit <>stands from various surfaces in room 0 LOB G safety                                            Type of ROM/Therapeutic Exercise  Type of ROM/Therapeutic Exercise: Resistive Bands  Comment: BUE therex to increase strength/endurance for ease of self care tasks. Yellow T band x 15 reps x 2 sets x all planes while standing unsupported. 0 RBs and 0 LOB noted. Plan   Plan  Times per week: 7x/wk  Times per day: Daily  G-Code     OutComes Score                                                  AM-PAC Score             Goals  Short term goals  Time Frame for Short term goals: 10 days  Short term goal 1: Pt will complete bathing, dressing, and grooming routine at [facility] with mod I demonstrating good use of energy conservation strategies to increase ADL independence. Short term goal 2: Pt will tolerate 15 minutes of BUE ther ex/ther act with decreased SOB and O2 maintained >90% to increase overall functional activity tolerance and BUE strength. Short term goal 3: Pt will demonstrate improved use of energy conservation strategies (pacing, rest breaks, use of compensatory strategies, and breathing techniques) with 0% verbal cues during functional activity performance. Patient Goals   Patient goals : Pt's goal is to return home to [facility] to complete leisure tasks (exercise, pet care), home responsibilities and work as prior.         Therapy Time   Individual Concurrent Group Co-treatment   Time In 1232         Time Out 1256         Minutes [address] [facility] [address] SOFIA

## 2020-01-20 NOTE — PROGRESS NOTES
Nutrition Assessment    Type and Reason for Visit: Initial, Positive Nutrition Screen(MST 2: wt loss, decreased PO)    Nutrition Recommendations:   1. Continue current diet, long term recommend CC 45 carbohydrates per meal with 15 grams at HS. 2. Recommend addition of probiotic due to ATB therapy. Nutrition Assessment: mild malnutrition r/t insufficient energy intakes aeb weight loss 3% x 3 days and decereased PO x 3 days (< 50%). States PO is back to normal, states \"I feel like a queen, I get 3 meals that I don't have to cook. \" Noted lung nodule per CT, Pt concerned about this (points to lung), to see a specialist at noon today. A1c 6.5, Glucose has improved to 129. Pt declined CC diet education states she has information at home. She is looking at sugars, recommended to look at carbohydrates instead. She is trying to lose weight and has been working out at [employer]. I discussed the importance of 30 minutes of physical activity a minimum of 5 days per week. Recommend addition of probiotic due to ATB therapy. Malnutrition Assessment:  · Malnutrition Status: Mild Malnutrition  · Context: Acute illness or injury  · Findings of the 6 clinical characteristics of malnutrition (Minimum of 2 out of 6 clinical characteristics is required to make the diagnosis of moderate or severe Protein Calorie Malnutrition based on AND/ASPEN Guidelines):  1. Energy Intake-Less than or equal to 50% of estimated energy requirement, (3 days)    2. Weight Loss-2% loss or greater, (3 days)  3. Fat Loss-No significant subcutaneous fat loss,    4. Muscle Loss-No significant muscle mass loss,    5. Fluid Accumulation-No significant fluid accumulation,    6.  Strength-Not measured    Nutrition Risk Level:  Moderate    Nutrient Needs:  · Estimated Daily Total Kcal: 9539-6430(58-32/NC)  · Estimated Daily Protein (g): 79-92g(1.3-1.5 g/kg)  · Estimated Daily Total Fluid (ml/day): 2200 ml    Nutrition Diagnosis:   · Problem:

## 2020-01-20 NOTE — PROGRESS NOTES
Physical Therapy    Facility/Department: [facility] MED SURG  Initial Assessment    NAME: Anish Estevez  : 1950  MRN: [id number]    Date of Service: 2020    Discharge Recommendations:  Continue to assess pending progress        Assessment   Body structures, Functions, Activity limitations: Decreased functional mobility ; Decreased high-level IADLs;Decreased endurance;Decreased strength  Assessment: Pt is a 81year old female that was admitted for pneumonia. Pt presents with general weakness, decreased ambulation tolerance and reduced endurance. pt will benefit from skilled PT in order to address these deficits. Treatment Diagnosis: general weakness  Prognosis: Good  Decision Making: Medium Complexity  PT Education: Goals;PT Role;Plan of Care  REQUIRES PT FOLLOW UP: Yes  Activity Tolerance  Activity Tolerance: Patient Tolerated treatment well       Patient Diagnosis(es): The primary encounter diagnosis was Pneumonia due to organism. Diagnoses of Acute pulmonary edema (HCC) and Acute respiratory failure with hypoxia (HCC) were also pertinent to this visit. has a past medical history of Allergic rhinitis, Arrhythmia, Atrial fibrillation ([address] Diabetes mellitus ([address] Facial weakness, H/O cardiovascular stress test, H/O echocardiogram, H/O echocardiogram, History of Holter monitoring, Hypertension, Psoriasis, S/P ablation of atrial flutter, and Trochanteric bursitis. has a past surgical history that includes Hysterectomy (); Toe Surgery (10 - 15 years ago); Umbilical hernia repair (); hernia repair; Appendectomy ('S); Ankle surgery (Right, 2017); Ankle fracture surgery (Right, 2017); Ankle fracture surgery (Right, 2017); and Pacemaker insertion (2018).     Restrictions  Restrictions/Precautions  Restrictions/Precautions: General Precautions  Vision/Hearing  Vision: Impaired  Vision Exceptions: Wears glasses at all times  Hearing: Within functional limits Static: Good  Standing - Dynamic: Good;-        Plan   Plan  Times per week: 7x/wk   Times per day: Twice a day  Plan weeks: 2x/daily except weekends 1x/daily   Current Treatment Recommendations: Strengthening, Gait Training, Patient/Caregiver Education & Training, Stair training, Equipment Evaluation, Education, & procurement, Balance Training, Neuromuscular Re-education, Functional Mobility Training, Endurance Training, Home Exercise Program, Transfer Training, Safety Education & Training  Safety Devices  Type of devices: Left in chair, Call light within reach(doctor was in with patient when PT left )    G-Code       OutComes Score                                                  AM-PAC Score             Goals  Short term goals  Time Frame for Short term goals: 10 days   Short term goal 1: Pt will be educated on her POC and HEP  Short term goal 2: Pt will perform all transfers independently   Short term goal 3: Pt will ambulate 150 feet with no AD with supervision and minimal SOB  Patient Goals   Patient goals : \"to get better and go home\"       Therapy Time   Individual Concurrent Group Co-treatment   Time In 0704         Time Out 0721         Minutes 30 Catalina Sainz, PT

## 2020-01-20 NOTE — PROGRESS NOTES
oriented to name, place and time. Gait is normal.  SKIN:  no bruising or bleeding, normal skin color, texture, turgor, no redness, warmth, or swelling, no rashes and no lesions  EXT:     no cyanosis, clubbing or edema present    -----------------------------------------------------------------  Diagnostic Data: Reviewed    Narrative   EXAMINATION:   CTA OF THE CHEST, 1/19/2020 12:22 pm       TECHNIQUE:   CTA of the chest was performed after the administration of intravenous   contrast.  Multiplanar reformatted images are provided for review.  MIP   images are provided for review. Dose modulation, iterative reconstruction,   and/or weight based adjustment of the mA/kV was utilized to reduce the   radiation dose to as low as reasonably achievable.       COMPARISON:   Chest radiograph January 18, 2020       CT chest October 25, 2016       HISTORY:   ORDERING SYSTEM PROVIDED HISTORY: Cough, shortness of breath   TECHNOLOGIST PROVIDED HISTORY:   Cough, shortness of breath   CT with PE protocol       FINDINGS:   Chest wall: Left chest wall pacemaker.  No axillary adenopathy.       Upper Abdomen: Small hiatal hernia.       Mediastinum: No cardiomegaly.  No pericardial effusion.  Coronary artery   calcifications.  Atherosclerotic calcification of the thoracic aorta.    Prominent nonspecific mediastinal lymph nodes.  No definite adenopathy by   size criteria.       Pulmonary Arteries: Nondiagnostic evaluation for pulmonary embolus due to   bolus timing.  Majority of the contrast is within the aorta.  No gross   evidence of large central pulmonary embolus.       Lungs: No pleural effusions.  Mild emphysematous changes.  Nonspecific   nodular opacity within the lingula measuring up to 17 mm.  Multiple punctate   adjacent nodules are seen.       Bones: Mild thoracic spine degenerative changes.           Impression   Nondiagnostic evaluation for pulmonary embolus due to bolus timing.  No gross   evidence of large central pulmonary embolus.       Small nonspecific nodular opacity within the lingula measuring up to 17 mm. Given adjacent punctate nodules this may be infectious.  Recommend follow-up   to exclude malignancy.       Fleischner Society guidelines for follow-up and management of incidentally   detected pulmonary nodules:       Single Solid Nodule:       Nodule size greater than 8 mm           In a low-risk patient, consider CT at 3 months, PET/CT, or tissue sampling.       In a high-risk patient, consider CT at 3 months, PET/CT, or tissue sampling.       - Low risk patients include individuals with minimal or absent history of   smoking and other known risk factors.       - High risk patients include individuals with a history or smoking or known   risk factors.       Radiology 2017 http://pubs. rsna.org/doi/full/10.1148/radiol. 5824139562             ASSESSMENT:   Principal Problem:    Pneumonia of left lower lobe due to infectious organism [facility])  Active Problems:    Acute respiratory failure with hypoxia (HCC)    Pulmonary nodules    Paroxysmal supraventricular tachycardia (HCC)    Essential hypertension    H/O atrial flutter    Type 2 diabetes mellitus without complication, without long-term current use of insulin (HCC)  Resolved Problems:    * No resolved hospital problems.  *        PLAN:  · Appreciate Pulmonary consult to guide to pulmonary nodules  · Solumedrol       ELAINA Porras, NP-C\

## 2020-01-20 NOTE — PROGRESS NOTES
Physical Therapy  Facility/Department: [facility] AT THE [facility] MED SURG  Daily Treatment Note  NAME: Heena Rod  : 1950  MRN: [id number]    Date of Service: 2020    Discharge Recommendations:  Continue to assess pending progress        Assessment   Treatment Diagnosis: general weakness  Prognosis: Good  REQUIRES PT FOLLOW UP: Yes  Activity Tolerance  Activity Tolerance: Patient Tolerated treatment well     Patient Diagnosis(es): The primary encounter diagnosis was Pneumonia due to organism. Diagnoses of Acute pulmonary edema (HCC) and Acute respiratory failure with hypoxia (HCC) were also pertinent to this visit. has a past medical history of Allergic rhinitis, Arrhythmia, Atrial fibrillation ([facility] [address] Diabetes mellitus ([facility] [address] Facial weakness, H/O cardiovascular stress test, H/O echocardiogram, H/O echocardiogram, History of Holter monitoring, Hypertension, Psoriasis, S/P ablation of atrial flutter, and Trochanteric bursitis. has a past surgical history that includes Hysterectomy (); Toe Surgery (10 - 15 years ago); Umbilical hernia repair (); hernia repair; Appendectomy ('S); Ankle surgery (Right, 2017); Ankle fracture surgery (Right, 2017); Ankle fracture surgery (Right, 2017); and Pacemaker insertion (2018). Restrictions  Restrictions/Precautions  Restrictions/Precautions: General Precautions  Subjective   General  Chart Reviewed: Yes  Response To Previous Treatment: Patient with no complaints from previous session. Family / Caregiver Present: No  Referring Practitioner: Dr. Edwina Nichols   Subjective  Subjective: Denies pain. reports feeling better.           Orientation     Cognition      Objective   Bed mobility  Comment: Pt in chair upon arrival.  Transfers  Sit to Stand: Supervision  Stand to sit: Supervision  Ambulation  Ambulation?: Yes  WB Status: unrestricted   Ambulation 1  Surface: level tile  Device: No Device(Pushed IV pole)  Other Apparatus: O2  Assistance: Stand

## 2020-01-20 NOTE — DISCHARGE INSTR - DIET
 Good nutrition is important when healing from an illness, injury, or surgery. Follow any nutrition recommendations given to you during your hospital stay.  If you were given an oral nutrition supplement while in the hospital, continue to take this supplement at home. You can take it with meals, in-between meals, and/or before bedtime. These supplements can be purchased at most local grocery stores, pharmacies, and chain super-stores.  If you have any questions about your diet or nutrition, call the hospital and ask for the dietitian. 1. Aim to have 45 grams of total carbohydrate per meal (3 meals per day) with 15 grams carbohydrate with an evening snack. 2. Eat breakfast within 1 hour of getting up in the morning. 3. Eat meals every 4.5-5 hours apart, evening snack should be 2.5-3 hours after dinner. 4. Include protein foods with meals and snacks (eggs, fish, peanut butter, chicken, etc.)  5. Exercise for 30 minutes a minimum of 5 days per week.

## 2020-01-20 NOTE — CONSULTS
Pulmonary/Critical Care consultation    Patient's name:  Johnny Sandoval Record Number: [id number]  Patient's account/billing number: [de-identified]  Patient's YOB: 1950  Age: 81 y.o. Date of Admission: 1/18/2020  8:45 PM  Date of Consult: 1/20/2020      Primary Care Physician: ELAINA Esparza CNP      Code Status: Full Code    Reason for consult: Possible lung mass      HISTORY OF PRESENT ILLNESS:   History was obtained from chart review and the patient. Ms. Doe Tabor is a 81 y.o. white woman with no known history of lung problems was admitted with 1 week history of having fever chills and coughing up yellow-colored sputum with occasional wheezing  Patient had a chest x-ray done and it was concerning for an increased density in the left lateral lung base suggesting atelectasis or pneumonia with a small effusion. To evaluate this further, patient had a CT scan of the chest done which revealed a lingular infiltrate versus a nodule with adjacent nodules  No exposure to tuberculosis  No occupational exposures or hobbies or pets that would cause lung disease  No recent travel history        Past Medical History:        Diagnosis Date    Allergic rhinitis     Arrhythmia     Atrial fibrillation ([address]     Diabetes mellitus ([address]     Facial weakness ~ 2002    traumatic injury    H/O cardiovascular stress test 11/28/2016    Probably normal myocardial perfusion SPECT imagin. There is no evidence of ischemia. There is a fixed defect in septum of uncertain etiology but potentially due to LBBB, attenuation artifact or less likely prior infarct. Normal wall motion and wall thickening.  H/O echocardiogram 11/04/2016    H/O echocardiogram 07/11/2018    Phenotypic picture of hypertrophic cardiomyopathy with septal hyertrophy and systolic antreior motion of the mitral valve but without signifcant resting outflow tract obstruction.  EF mentioned above were negative  Breast ROS:  Completed and except as mentioned above were negative  Respiratory ROS:  Completed and except as mentioned above were negative  Cardiovascular ROS:  Completed and except as mentioned above were negative  Gastrointestinal ROS: Completed and except as mentioned above were negative  Genito-Urinary ROS:  Completed and except as mentioned above were negative  Musculoskeletal ROS:  Completed and except as mentioned above were negative  Neurological ROS:  Completed and except as mentioned above were negative  Dermatological ROS:  Completed and except as mentioned above were negative          Physical Exam:    Vitals: BP (!) 140/66   Pulse 87   Temp 97.6 °F (36.4 °C) (Oral)   Resp 16   Ht 5' 7\" (1.702 m)   Wt 194 lb 3.2 oz (88.1 kg)   LMP 03/19/1992   SpO2 92%   BMI 30.42 kg/m²     Last Body weight:   Wt Readings from Last 3 Encounters:   01/20/20 194 lb 3.2 oz (88.1 kg)   10/14/19 196 lb 6.4 oz (89.1 kg)   03/28/19 193 lb 6.4 oz (87.7 kg)       Body Mass Index : Body mass index is 30.42 kg/m². Intake and Output summary:     Intake/Output Summary (Last 24 hours) at 1/20/2020 1002  Last data filed at 1/20/2020 0513  Gross per 24 hour   Intake 3711 ml   Output 380 ml   Net 3331 ml       Physical Examination:   PHYSICAL EXAMINATION:  Vitals:    01/20/20 0002 01/20/20 0500 01/20/20 0536 01/20/20 0645   BP:    (!) 140/66   Pulse: 88   87   Resp:    16   Temp:    97.6 °F (36.4 °C)   TempSrc:    Oral   SpO2:   92%    Weight:  194 lb 3.2 oz (88.1 kg)     Height:         Constitutional: This is a well developed, well nourished, 30-34.9 - Obesity Grade I 81y.o. year old female who is alert, oriented, cooperative and in no apparent distress. Head:normocephalic and atraumatic. EENT:   PAOLA. No conjunctival injections. Septum was midline, mucosa was without erythema, exudates or cobblestoning. No thrush was noted.  Mallampati III (soft palate, base of uvula

## 2020-01-20 NOTE — CARE COORDINATION
Explained patients right to have family, representative or physician notified of their admission. Patient has Requested for physician to be notified. Patient has Declined for family/representative to be notified.    Notified office of Mikael Leyva CNP of admission,   Family aware of admission   LANE Alba RN

## 2020-01-21 VITALS
WEIGHT: 196 LBS | BODY MASS INDEX: 30.76 KG/M2 | OXYGEN SATURATION: 96 % | DIASTOLIC BLOOD PRESSURE: 84 MMHG | HEART RATE: 88 BPM | RESPIRATION RATE: 18 BRPM | HEIGHT: 67 IN | SYSTOLIC BLOOD PRESSURE: 141 MMHG | TEMPERATURE: 97.7 F

## 2020-01-21 LAB
ABSOLUTE EOS #: 0.2 K/UL (ref 0–0.44)
ABSOLUTE IMMATURE GRANULOCYTE: 0.15 K/UL (ref 0–0.3)
ABSOLUTE LYMPH #: 1.75 K/UL (ref 1.1–3.7)
ABSOLUTE MONO #: 0.72 K/UL (ref 0.1–1.2)
BASOPHILS # BLD: 0 % (ref 0–2)
BASOPHILS ABSOLUTE: 0.03 K/UL (ref 0–0.2)
DIFFERENTIAL TYPE: ABNORMAL
EOSINOPHILS RELATIVE PERCENT: 3 % (ref 1–4)
GLUCOSE BLD-MCNC: 106 MG/DL (ref 74–100)
GLUCOSE BLD-MCNC: 111 MG/DL (ref 74–100)
HCT VFR BLD CALC: 38.7 % (ref 36.3–47.1)
HEMOGLOBIN: 12.1 G/DL (ref 11.9–15.1)
IMMATURE GRANULOCYTES: 2 %
LYMPHOCYTES # BLD: 22 % (ref 24–43)
MCH RBC QN AUTO: 29.6 PG (ref 25.2–33.5)
MCHC RBC AUTO-ENTMCNC: 31.3 G/DL (ref 28.4–34.8)
MCV RBC AUTO: 94.6 FL (ref 82.6–102.9)
MONOCYTES # BLD: 9 % (ref 3–12)
NRBC AUTOMATED: 0 PER 100 WBC
PDW BLD-RTO: 13.8 % (ref 11.8–14.4)
PLATELET # BLD: 234 K/UL (ref 138–453)
PLATELET ESTIMATE: ABNORMAL
PMV BLD AUTO: 9.9 FL (ref 8.1–13.5)
RBC # BLD: 4.09 M/UL (ref 3.95–5.11)
RBC # BLD: ABNORMAL 10*6/UL
SEG NEUTROPHILS: 64 % (ref 36–65)
SEGMENTED NEUTROPHILS ABSOLUTE COUNT: 5.15 K/UL (ref 1.5–8.1)
WBC # BLD: 8 K/UL (ref 3.5–11.3)
WBC # BLD: ABNORMAL 10*3/UL

## 2020-01-21 PROCEDURE — 36415 COLL VENOUS BLD VENIPUNCTURE: CPT

## 2020-01-21 PROCEDURE — 6370000000 HC RX 637 (ALT 250 FOR IP): Performed by: FAMILY MEDICINE

## 2020-01-21 PROCEDURE — 94669 MECHANICAL CHEST WALL OSCILL: CPT

## 2020-01-21 PROCEDURE — 6360000002 HC RX W HCPCS: Performed by: NURSE PRACTITIONER

## 2020-01-21 PROCEDURE — 94640 AIRWAY INHALATION TREATMENT: CPT

## 2020-01-21 PROCEDURE — 6360000002 HC RX W HCPCS: Performed by: FAMILY MEDICINE

## 2020-01-21 PROCEDURE — 94761 N-INVAS EAR/PLS OXIMETRY MLT: CPT

## 2020-01-21 PROCEDURE — 85025 COMPLETE CBC W/AUTO DIFF WBC: CPT

## 2020-01-21 PROCEDURE — 97110 THERAPEUTIC EXERCISES: CPT

## 2020-01-21 PROCEDURE — 97116 GAIT TRAINING THERAPY: CPT

## 2020-01-21 RX ORDER — PREDNISONE 20 MG/1
20 TABLET ORAL 2 TIMES DAILY
Qty: 10 TABLET | Refills: 0 | Status: SHIPPED | OUTPATIENT
Start: 2020-01-21 | End: 2020-01-26

## 2020-01-21 RX ORDER — LEVOFLOXACIN 500 MG/1
500 TABLET, FILM COATED ORAL DAILY
Qty: 7 TABLET | Refills: 0 | Status: SHIPPED | OUTPATIENT
Start: 2020-01-21 | End: 2020-01-28

## 2020-01-21 RX ORDER — ALBUTEROL SULFATE 90 UG/1
2 AEROSOL, METERED RESPIRATORY (INHALATION) 4 TIMES DAILY PRN
Qty: 1 INHALER | Refills: 0 | Status: SHIPPED | OUTPATIENT
Start: 2020-01-21

## 2020-01-21 RX ADMIN — PANTOPRAZOLE SODIUM 40 MG: 40 TABLET, DELAYED RELEASE ORAL at 08:05

## 2020-01-21 RX ADMIN — ASPIRIN 325 MG: 325 TABLET, COATED ORAL at 08:05

## 2020-01-21 RX ADMIN — IPRATROPIUM BROMIDE AND ALBUTEROL SULFATE 1 AMPULE: .5; 3 SOLUTION RESPIRATORY (INHALATION) at 10:30

## 2020-01-21 RX ADMIN — METOPROLOL SUCCINATE 12.5 MG: 25 TABLET, EXTENDED RELEASE ORAL at 08:05

## 2020-01-21 RX ADMIN — LEVOFLOXACIN 750 MG: 5 INJECTION, SOLUTION INTRAVENOUS at 10:28

## 2020-01-21 RX ADMIN — IPRATROPIUM BROMIDE AND ALBUTEROL SULFATE 1 AMPULE: .5; 3 SOLUTION RESPIRATORY (INHALATION) at 05:10

## 2020-01-21 RX ADMIN — ENOXAPARIN SODIUM 40 MG: 40 INJECTION SUBCUTANEOUS at 08:05

## 2020-01-21 RX ADMIN — FAMOTIDINE 20 MG: 10 TABLET ORAL at 08:07

## 2020-01-21 RX ADMIN — DILTIAZEM HYDROCHLORIDE 240 MG: 120 CAPSULE, COATED, EXTENDED RELEASE ORAL at 08:05

## 2020-01-21 RX ADMIN — CETIRIZINE HYDROCHLORIDE 5 MG: 10 TABLET, FILM COATED ORAL at 08:05

## 2020-01-21 RX ADMIN — FUROSEMIDE 20 MG: 20 TABLET ORAL at 08:05

## 2020-01-21 ASSESSMENT — PAIN SCALES - GENERAL: PAINLEVEL_OUTOF10: 0

## 2020-01-21 NOTE — PROGRESS NOTES
Physical Therapy  Facility/Department: [facility] AT THE [facility] MED SURG  Daily Treatment Note  NAME: Theresa Breaux  : 1950  MRN: [id number]    Date of Service: 2020    Discharge Recommendations:  Continue to assess pending progress        Assessment   Treatment Diagnosis: general weakness  Prognosis: Good  Patient Education: Therapy discharge folder reviewed with appropriate understanding. REQUIRES PT FOLLOW UP: Yes  Activity Tolerance  Activity Tolerance: Patient Tolerated treatment well     Patient Diagnosis(es): The primary encounter diagnosis was Pneumonia due to organism. Diagnoses of Acute pulmonary edema (HCC) and Acute respiratory failure with hypoxia (HCC) were also pertinent to this visit. has a past medical history of Allergic rhinitis, Arrhythmia, Atrial fibrillation ([address] Diabetes mellitus ([address] Facial weakness, H/O cardiovascular stress test, H/O echocardiogram, H/O echocardiogram, History of Holter monitoring, Hypertension, Psoriasis, S/P ablation of atrial flutter, and Trochanteric bursitis. has a past surgical history that includes Hysterectomy (); Toe Surgery (10 - 15 years ago); Umbilical hernia repair (); hernia repair; Appendectomy ('S); Ankle surgery (Right, 2017); Ankle fracture surgery (Right, 2017); Ankle fracture surgery (Right, 2017); and Pacemaker insertion (2018). Restrictions  Restrictions/Precautions  Restrictions/Precautions: General Precautions  Required Braces or Orthoses?: No  Subjective   General  Chart Reviewed: Yes  Response To Previous Treatment: Patient with no complaints from previous session. Family / Caregiver Present: No  Referring Practitioner: Dr. Harlan Morales   Subjective  Subjective: Denies pain. reports feeling better. General Comment  Comments: Therapy discharge folder reviewed with patient.           Orientation  Orientation  Overall Orientation Status: Within Normal Limits  Cognition      Objective   Bed mobility  Supine to Sit: Modified independent  Sit to Supine: Modified independent  Scooting: Modified independent  Transfers  Sit to Stand: Supervision  Stand to sit: Supervision  Ambulation  Ambulation?: Yes  WB Status: unrestricted   Ambulation 1  Surface: level tile  Device: No Device(pusging IV pole)  Assistance: Supervision  Quality of Gait: Steady reciprocal gait pattern, No LOB  Distance: 350'        Exercises  Hip Flexion: x20  Hip Abduction: x20  Comments: Above listed exercises completed in standing including heel raises. No rest breaks.      Goals  Short term goals  Time Frame for Short term goals: 10 days   Short term goal 1: Pt will be educated on her POC and HEP  Short term goal 2: Pt will perform all transfers independently   Short term goal 3: Pt will ambulate 150 feet with no AD with supervision and minimal SOB  Patient Goals   Patient goals : \"to get better and go home\"    Plan    Plan  Times per week: 7x/wk   Times per day: Twice a day  Plan weeks: 2x/daily except weekends 1x/daily   Current Treatment Recommendations: Strengthening, Gait Training, Patient/Caregiver Education & Training, Stair training, Equipment Evaluation, Education, & procurement, Balance Training, Neuromuscular Re-education, Functional Mobility Training, Endurance Training, Home Exercise Program, Transfer Training, Safety Education & Training  Safety Devices  Type of devices: Left in chair, Call light within reach, Gait belt, Nurse notified     Therapy Time   Individual Concurrent Group Co-treatment   Time In 0739         Time Out 0803         Minutes 24                 Chandler Gómez PTA

## 2020-01-21 NOTE — PROGRESS NOTES
[facility] SPECIALTY [facility]  OCCUPATIONAL THERAPY  No Visit Note    [] ICU    [x] Acute   Patient: Vlad Hinds  Room: 0301/0301-01      Vlad Hinds not seen on 1/21/2020 at 11:03 AM due to D/C orders and D/C summary in place. Patient declining OT tx this date as a result. Therapist educated patient on D/C planning and D/C recommendations. Patient demonstrated good understanding of education. No additional questions/concerns re D/C at this time.           Signature: Kori Bond OTR/L

## 2020-01-21 NOTE — PROGRESS NOTES
Progress Note  Marvin Dallas MD    SUBJECTIVE: Patient is seen for Principal Problem:    Pneumonia of left lower lobe due to infectious organism [facility])  Active Problems:    Mild malnutrition (HCC)    Paroxysmal supraventricular tachycardia ([address]    Essential hypertension    H/O atrial flutter    Type 2 diabetes mellitus without complication, without long-term current use of insulin (HCC)    Acute respiratory failure with hypoxia ([address]    Pulmonary nodules  Resolved Problems:    * No resolved hospital problems. *     cough better  Off oxygen    OBJECTIVE:    Vitals:   Vitals:    01/21/20 0704   BP: (!) 141/84   Pulse: 88   Resp: 18   Temp: 97.7 °F (36.5 °C)   SpO2: 90%     Weight: 196 lb (88.9 kg)   Height: 5' 7\" (170.2 cm)   -----------------------------------------------------------------  Exam:    CONSTITUTIONAL:  awake, alert, cooperative, no apparent distress, and appears stated age  EYES:  Lids and lashes normal, pupils equal, round and reactive to light, extra ocular muscles intact, sclera clear, conjunctiva normal  ENT:  Normocephalic, without obvious abnormality, atraumatic, sinuses nontender on palpation, external ears without lesions, oral pharynx with moist mucus membranes, tonsils without erythema or exudates, gums normal and good dentition. NECK:  Supple, symmetrical, trachea midline, no adenopathy, thyroid symmetric, not enlarged and no tenderness, skin normal  HEMATOLOGIC/LYMPHATICS:  no cervical lymphadenopathy  LUNGS:  Air entry better,no wheezing or retraction, has some rhonchi bilat  CARDIOVASCULAR:  Normal apical impulse, regular rate and rhythm, normal S1 and S2, no S3 or S4, and no murmur noted  ABDOMEN:  No scars, normal bowel sounds, soft, non-distended, non-tender, no masses palpated, no hepatosplenomegally    MUSCULOSKELETAL:  there is no redness, warmth, or swelling of the joints  NEUROLOGIC:  Awake, alert, oriented to name, place and time. Cranial nerves II-XII are grossly intact. Motor is 5 out of 5 bilaterally. Cerebellar finger to nose, heel to shin intact. Sensory is intact. Babinski down going, Romberg negative, and gait is normal.  SKIN:  no bruising or bleeding  EXT:     no cyanosis, clubbing or edema present    -----------------------------------------------------------------  Diagnostic Data: Reviewed    More Recent Labs:    Results for orders placed or performed during the hospital encounter of 01/18/20   Urine Culture   Result Value Ref Range    Specimen Description . VOIDED URINE     Special Requests NOT REPORTED     Culture NO GROWTH    Culture blood #1   Result Value Ref Range    Specimen Description . BLOOD     Special Requests  RT WRIST     Culture NO GROWTH 2 DAYS    Culture blood #2   Result Value Ref Range    Specimen Description . BLOOD     Special Requests  L WRIST 20MLS     Culture NO GROWTH 2 DAYS    Rapid influenza A/B antigens   Result Value Ref Range    Specimen Description . NASOPHARYNGEAL SWAB     Special Requests NOT REPORTED     Direct Exam       Presumptive negative for the presence of Influenza A and Influenza B antigen.   PCR confirmation of negative results is recommended, since the antigen present in the specimen may be below the detection limit of the test.   CBC auto differential   Result Value Ref Range    WBC 6.1 3.5 - 11.3 k/uL    RBC 4.38 3.95 - 5.11 m/uL    Hemoglobin 13.0 11.9 - 15.1 g/dL    Hematocrit 40.9 36.3 - 47.1 %    MCV 93.4 82.6 - 102.9 fL    MCH 29.7 25.2 - 33.5 pg    MCHC 31.8 28.4 - 34.8 g/dL    RDW 13.5 11.8 - 14.4 %    Platelets 583 020 - 894 k/uL    MPV 9.9 8.1 - 13.5 fL    NRBC Automated 0.0 0.0 per 100 WBC    Differential Type NOT REPORTED     Seg Neutrophils 60 36 - 65 %    Lymphocytes 22 (L) 24 - 43 %    Monocytes 13 (H) 3 - 12 %    Eosinophils % 4 1 - 4 %    Basophils 1 0 - 2 %    Immature Granulocytes 0 0 %    Segs Absolute 3.69 1.50 - 8.10 k/uL    Absolute Lymph # 1.34 1.10 - 3.70 k/uL    Absolute Mono # 0.82 0.10 - 1.20 k/uL Result Value Ref Range    D-Dimer, Quant 0.55 (H) 0.19 - 0.50 mg/L FEU   Magnesium   Result Value Ref Range    Magnesium 2.0 1.6 - 2.6 mg/dL   Brain Natriuretic Peptide   Result Value Ref Range    Pro- <300 pg/mL    BNP Interpretation Pro-BNP Reference Range:    CBC auto differential   Result Value Ref Range    WBC 9.1 3.5 - 11.3 k/uL    RBC 4.37 3.95 - 5.11 m/uL    Hemoglobin 13.1 11.9 - 15.1 g/dL    Hematocrit 40.4 36.3 - 47.1 %    MCV 92.4 82.6 - 102.9 fL    MCH 30.0 25.2 - 33.5 pg    MCHC 32.4 28.4 - 34.8 g/dL    RDW 13.3 11.8 - 14.4 %    Platelets 595 228 - 910 k/uL    MPV 10.0 8.1 - 13.5 fL    NRBC Automated 0.0 0.0 per 100 WBC    Differential Type NOT REPORTED     WBC Morphology NOT REPORTED     RBC Morphology NOT REPORTED     Platelet Estimate NOT REPORTED     Seg Neutrophils 93 (H) 36 - 65 %    Lymphocytes 7 (L) 24 - 43 %    Monocytes 0 (L) 3 - 12 %    Eosinophils % 0 (L) 1 - 4 %    Immature Granulocytes 0 0 %    Basophils 0 0 - 2 %    Segs Absolute 8.46 (H) 1.50 - 8.10 k/uL    Absolute Lymph # 0.64 (L) 1.10 - 3.70 k/uL    Absolute Mono # 0.00 (L) 0.10 - 1.20 k/uL    Absolute Eos # 0.00 0.00 - 0.44 k/uL    Absolute Immature Granulocyte 0.00 0.00 - 0.30 k/uL    Basophils Absolute 0.00 0.0 - 0.2 k/uL    Morphology Normal    Basic Metabolic Panel w/ Reflex to MG   Result Value Ref Range    Glucose 180 (H) 70 - 99 mg/dL    BUN 15 8 - 23 mg/dL    CREATININE 0.74 0.50 - 0.90 mg/dL    Bun/Cre Ratio 20 9 - 20    Calcium 8.8 8.6 - 10.4 mg/dL    Sodium 135 135 - 144 mmol/L    Potassium 3.7 3.7 - 5.3 mmol/L    Chloride 95 (L) 98 - 107 mmol/L    CO2 24 20 - 31 mmol/L    Anion Gap 16 9 - 17 mmol/L    GFR Non-African American >60 >60 mL/min    GFR African American >60 >60 mL/min    GFR Comment          GFR Staging         Hemoglobin A1c   Result Value Ref Range    Hemoglobin A1C 6.5 (H) 4.8 - 5.9 %    Estimated Avg Glucose 140 mg/dL   Glucose, Whole Blood   Result Value Ref Range    POC Glucose 201 (H) 74 - 100 mg/dL   CBC auto differential   Result Value Ref Range    WBC 10.9 3.5 - 11.3 k/uL    RBC 3.93 (L) 3.95 - 5.11 m/uL    Hemoglobin 11.8 (L) 11.9 - 15.1 g/dL    Hematocrit 36.5 36.3 - 47.1 %    MCV 92.9 82.6 - 102.9 fL    MCH 30.0 25.2 - 33.5 pg    MCHC 32.3 28.4 - 34.8 g/dL    RDW 13.6 11.8 - 14.4 %    Platelets 553 391 - 913 k/uL    MPV 10.2 8.1 - 13.5 fL    NRBC Automated 0.0 0.0 per 100 WBC    Differential Type NOT REPORTED     WBC Morphology NOT REPORTED     RBC Morphology NOT REPORTED     Platelet Estimate NOT REPORTED     Seg Neutrophils 82 (H) 36 - 65 %    Lymphocytes 9 (L) 24 - 43 %    Monocytes 8 3 - 12 %    Eosinophils % 0 (L) 1 - 4 %    Basophils 0 0 - 2 %    Immature Granulocytes 1 (H) 0 %    Segs Absolute 8.96 (H) 1.50 - 8.10 k/uL    Absolute Lymph # 1.03 (L) 1.10 - 3.70 k/uL    Absolute Mono # 0.85 0.10 - 1.20 k/uL    Absolute Eos # <0.03 0.00 - 0.44 k/uL    Basophils Absolute <0.03 0.00 - 0.20 k/uL    Absolute Immature Granulocyte 0.07 0.00 - 0.30 k/uL   Glucose, Whole Blood   Result Value Ref Range    POC Glucose 134 (H) 74 - 100 mg/dL   Glucose, Whole Blood   Result Value Ref Range    POC Glucose 129 (H) 74 - 100 mg/dL   Glucose, Whole Blood   Result Value Ref Range    POC Glucose 131 (H) 74 - 100 mg/dL   CBC auto differential   Result Value Ref Range    WBC 8.0 3.5 - 11.3 k/uL    RBC 4.09 3.95 - 5.11 m/uL    Hemoglobin 12.1 11.9 - 15.1 g/dL    Hematocrit 38.7 36.3 - 47.1 %    MCV 94.6 82.6 - 102.9 fL    MCH 29.6 25.2 - 33.5 pg    MCHC 31.3 28.4 - 34.8 g/dL    RDW 13.8 11.8 - 14.4 %    Platelets 562 399 - 958 k/uL    MPV 9.9 8.1 - 13.5 fL    NRBC Automated 0.0 0.0 per 100 WBC    Differential Type NOT REPORTED     Seg Neutrophils 64 36 - 65 %    Lymphocytes 22 (L) 24 - 43 %    Monocytes 9 3 - 12 %    Eosinophils % 3 1 - 4 %    Basophils 0 0 - 2 %    Immature Granulocytes 2 (H) 0 %    Segs Absolute 5.15 1.50 - 8.10 k/uL    Absolute Lymph # 1.75 1.10 - 3.70 k/uL    Absolute Mono # 0.72 0.10 - 1.20 k/uL Absolute Eos # 0.20 0.00 - 0.44 k/uL    Basophils Absolute 0.03 0.00 - 0.20 k/uL    Absolute Immature Granulocyte 0.15 0.00 - 0.30 k/uL    WBC Morphology NOT REPORTED     RBC Morphology NOT REPORTED     Platelet Estimate NOT REPORTED    Glucose, Whole Blood   Result Value Ref Range    POC Glucose 106 (H) 74 - 100 mg/dL   Glucose, Whole Blood   Result Value Ref Range    POC Glucose 106 (H) 74 - 100 mg/dL   Glucose, Whole Blood   Result Value Ref Range    POC Glucose 111 (H) 74 - 100 mg/dL   EKG 12 Lead   Result Value Ref Range    Ventricular Rate 84 BPM    Atrial Rate 84 BPM    P-R Interval 156 ms    QRS Duration 138 ms    Q-T Interval 416 ms    QTc Calculation (Bazett) 491 ms    P Axis 58 degrees    R Axis -9 degrees    T Axis 125 degrees       ASSESSMENT:   Patient Active Problem List    Diagnosis Date Noted    Mild malnutrition ([facility] 01/20/2020     Priority: Medium     Class: Present on Admission    Acute respiratory failure with hypoxia ([address] 01/20/2020    H/O atrial flutter 01/19/2020    Type 2 diabetes mellitus without complication, without long-term current use of insulin ([address] 01/19/2020    Pulmonary nodules 01/19/2020    Pneumonia of left lower lobe due to infectious organism ([address] 01/18/2020    Hypotension 02/07/2019    SVT (supraventricular tachycardia) (HCC) 09/14/2018    High-grade atrioventricular block     Blepharospasm 07/17/2018    Clonic hemifacial spasm 07/17/2018    Closed bimalleolar fracture of right ankle with routine healing 02/22/2017    Wide-complex tachycardia ([address] 02/05/2017    HTN (hypertension) 02/05/2017    Demand ischemia ([address] 02/05/2017    Leucocytosis 02/05/2017    Hypokalemia 02/05/2017    Paroxysmal supraventricular tachycardia (HCC)     Left bundle branch block     Essential hypertension     Dyslipidemia     H/O cardiac murmur     Occult GI bleeding 03/26/2016    Incisional hernia, without obstruction or gangrene 06/17/2013    Bronchitis 02/22/2012  Umbilical hernia 31/27/9955         PLAN:  · D/c home on oral levaquin, bronchodilator and steroids  · F/u with pulmonary and PCP in 1 wk      Mario Adames M.D.

## 2020-01-21 NOTE — DISCHARGE SUMMARY
Discharge Summary    Reg Gary  :  1950  MRN:  [id number]    Admit date:  2020      Discharge date: 2020     Admitting Physician:  Yazmin Reed MD    Consultants:  Dr. Bc Vizcarra, pulmonology    Procedures: none    Complications: none    Discharge Condition: good    Hospital Course:   Reg Gary is a 81 y.o. female admitted with Community Acquired Pneumonia. Patient presented to the ED on 2020 with complaints of a productive cough and shortness of breath that had been increasing over the last week. Reviewing her CT Scan of her chest showed a 17mm nodule in the lung. Dr. Bc Vizcarra was consulted from pulmonology. He at this time feels that it is more infections and her antibiotics were changed. She will follow up with him as an outpatient. Recommend repeating the CT Scan in 6 weeks. Exam:  GEN:  alert and oriented to person, place and time, well-developed and well-nourished, in no acute distress  EYES: No gross abnormalities. , PERRL and Sclera nonicteric  NECK: normal, supple, no lymphadenopathy,  no carotid bruits  PULM: clear to auscultation bilaterally- no wheezes, rales or rhonchi, normal air movement, no respiratory distress  COR: regular rate & rhythm, no murmurs, no gallops, S1 normal and S2 normal  ABD:  soft, non-tender, non-distended, normal bowel sounds, no masses or organomegaly  EXT:   no cyanosis, clubbing or edema present    NEURO: follows commands, JUNE, no deficits  SKIN:  no rashes or significant lesions    Significant Diagnostic Studies:   Lab Results   Component Value Date    WBC 8.0 2020    HGB 12.1 2020     2020       Lab Results   Component Value Date    BUN 15 2020    CREATININE 0.74 2020     2020    K 3.7 2020    CALCIUM 8.8 2020    CL 95 (L) 2020    CO2 24 2020    LABGLOM >60 2020       Lab Results   Component Value Date    WBCUA None 2017    RBCUA 0 TO 2 02/05/2017    EPITHUA 2 TO 5 02/05/2017    LEUKOCYTESUR NEGATIVE 01/19/2020    SPECGRAV <1.005 (L) 01/19/2020    GLUCOSEU NEGATIVE 01/19/2020    KETUA NEGATIVE 01/19/2020    PROTEINU NEGATIVE 01/19/2020    HGBUR NEGATIVE 01/19/2020    CASTUA NOT REPORTED 02/05/2017    CRYSTUA NOT REPORTED 02/05/2017    BACTERIA NOT REPORTED 02/05/2017    YEAST NOT REPORTED 02/05/2017       Xr Chest Portable    Result Date: 1/18/2020  EXAMINATION: ONE XRAY VIEW OF THE CHEST 1/18/2020 9:42 pm COMPARISON: February 7, 2019 HISTORY: ORDERING SYSTEM PROVIDED HISTORY: cough, sob TECHNOLOGIST PROVIDED HISTORY: cough, sob FINDINGS: Heart size is stable. Pacemaker is noted. Prominent markings throughout the chest are noted bilaterally. This is increased from the prior. There is no pneumothorax. There is more focal increased density along the lateral aspect of the left hemidiaphragm. Similar findings were noted on the prior. Faint nodular density in the left lateral lung base is noted     New prominent markings throughout the chest bilaterally suggesting pulmonary edema. More focal increased density left lateral lung base is noted. This may represent atelectasis or pneumonia with a small effusion     Ct Chest Pulmonary Embolism W Contrast    Result Date: 1/19/2020  EXAMINATION: CTA OF THE CHEST, 1/19/2020 12:22 pm TECHNIQUE: CTA of the chest was performed after the administration of intravenous contrast.  Multiplanar reformatted images are provided for review. MIP images are provided for review. Dose modulation, iterative reconstruction, and/or weight based adjustment of the mA/kV was utilized to reduce the radiation dose to as low as reasonably achievable. COMPARISON: Chest radiograph January 18, 2020 CT chest October 25, 2016 HISTORY: ORDERING SYSTEM PROVIDED HISTORY: Cough, shortness of breath TECHNOLOGIST PROVIDED HISTORY: Cough, shortness of breath CT with PE protocol FINDINGS: Chest wall: Left chest wall pacemaker.   No axillary

## 2020-01-23 LAB
CULTURE: NORMAL
CULTURE: NORMAL
Lab: NORMAL
Lab: NORMAL
SPECIMEN DESCRIPTION: NORMAL
SPECIMEN DESCRIPTION: NORMAL

## 2020-02-03 ENCOUNTER — OFFICE VISIT (OUTPATIENT)
Dept: PULMONOLOGY | Age: 70
End: 2020-02-03
Payer: MEDICARE

## 2020-02-03 VITALS
BODY MASS INDEX: 30.76 KG/M2 | HEIGHT: 67 IN | TEMPERATURE: 97.6 F | RESPIRATION RATE: 16 BRPM | DIASTOLIC BLOOD PRESSURE: 79 MMHG | SYSTOLIC BLOOD PRESSURE: 126 MMHG | OXYGEN SATURATION: 94 % | WEIGHT: 196 LBS | HEART RATE: 84 BPM

## 2020-02-03 PROCEDURE — 99214 OFFICE O/P EST MOD 30 MIN: CPT | Performed by: INTERNAL MEDICINE

## 2020-02-03 RX ORDER — CETIRIZINE HYDROCHLORIDE 10 MG/1
10 TABLET ORAL DAILY
COMMUNITY

## 2020-02-03 RX ORDER — ASCORBIC ACID 500 MG
500 TABLET ORAL DAILY
COMMUNITY

## 2020-02-10 NOTE — PROGRESS NOTES
ROS:Completed and except as mentioned above were negative          Allergies: Allergies   Allergen Reactions    Seasonal        Medications:    Current Outpatient Medications:     cetirizine (ZYRTEC) 10 MG tablet, Take 10 mg by mouth daily, Disp: , Rfl:     vitamin C (ASCORBIC ACID) 500 MG tablet, Take 500 mg by mouth daily, Disp: , Rfl:     furosemide (LASIX) 20 MG tablet, Take 1 tablet by mouth 2 times daily, Disp: 180 tablet, Rfl: 3    Melatonin 10 MG TABS, Take by mouth nightly, Disp: , Rfl:     pantoprazole (PROTONIX) 40 MG tablet, Take 40 mg by mouth 2 times daily , Disp: , Rfl:     Aspirin Buf,[id number], (BUFFERED ASPIRIN) 325 MG TABS, Take 1 tablet by mouth daily, Disp: 90 tablet, Rfl: 3    metoprolol succinate (TOPROL XL) 25 MG extended release tablet, Take 0.5 tablets by mouth daily, Disp: 45 tablet, Rfl: 3    metFORMIN (GLUCOPHAGE) 500 MG tablet, Take 500 mg by mouth 2 times daily (with meals), Disp: , Rfl:     diltiazem (DILACOR XR) 240 MG extended release capsule, Take 240 mg by mouth daily, Disp: , Rfl:     atorvastatin (LIPITOR) 40 MG tablet, Take 40 mg by mouth nightly , Disp: , Rfl:     Multiple Vitamin (MULTIVITAMIN PO), Take  by mouth.  , Disp: , Rfl:     albuterol sulfate  (90 Base) MCG/ACT inhaler, Inhale 2 puffs into the lungs 4 times daily as needed for Wheezing (Patient not taking: Reported on 2/3/2020), Disp: 1 Inhaler, Rfl: 0    calamine lotion, Apply topically as needed Apply topically as needed. , Disp: , Rfl:       Objective:    Physical Exam:  Vitals: /79   Pulse 84   Temp 97.6 °F (36.4 °C)   Resp 16   Ht 5' 7\" (1.702 m)   Wt 196 lb (88.9 kg)   LMP 03/19/1992   SpO2 94%   BMI 30.70 kg/m²   Last 3 weights: Wt Readings from Last 3 Encounters:   02/03/20 196 lb (88.9 kg)   01/21/20 196 lb (88.9 kg)   10/14/19 196 lb 6.4 oz (89.1 kg)     Body mass index is 30.7 kg/m².   Physical Examination:   PHYSICAL EXAMINATION:  Vitals:    02/03/20 1059   BP: 126/79   Pulse: 84   Resp: 16   Temp: 97.6 °F (36.4 °C)   SpO2: 94%   Weight: 196 lb (88.9 kg)   Height: 5' 7\" (1.702 m)       Physical Exam    Constitutional:This is a well developed, well nourished, 30-34.9 - Obesity Grade I 81y.o. year old female who is alert, oriented, cooperative andin no apparent distress. Head:normocephalic and atraumatic. EENT:   PAOLA. No conjunctival injections. Septum was midline, mucosa was without erythema, exudates or cobblestoning. No thrush was noted. Mallampati III (soft palate, base of uvula visible). Has left facial palsy  Neck: Supple without thyromegaly. No elevated JVP. Trachea was midline. Respiratory: Chest was symmetrical without dullness to percussion. Breath sounds bilaterally were clear to auscultation. There were no wheezes, rhonchi or rales. There isno intercostal retraction or use of accessory muscles. No egophony noted. Cardiovascular: Regular without murmur, clicks, gallops or rubs. Abdomen: Slightly rounded and soft without organomegaly. No rebound tenderness, rigidity or guarding wasappreciated. Lymphatic: Nolymphadenopathy. Musculoskeletal:Normal curvature of the spine. No gross muscle weakness. Extremities: Has bilateral lower extremity edema, no ulcerations, tenderness, varicosities or erythema. Muscle size, tone andstrength are normal.  No involuntary movements are noted. Skin:  Warm and dry. Good color, turgor and pigmentation. No lesions or scars. Neurological/Psychiatric: The patient's general behavior, level of consciousness, thought content and emotional status is normal.       Labs:  None    Pulmonary FunctionsTesting Results:      Radiological reports:    CXR      CT Scans        Assessment:    1. Pneumonia of left lower lobe due to infectious organism ([address]    2. Lung mass    3. Centrilobular emphysema ([address]    4. Chronic diastolic (congestive) heart failure (HCC)    5. Smoking history    6.  Obesity due to excess calories,

## 2020-02-13 ENCOUNTER — TELEPHONE (OUTPATIENT)
Dept: CARDIOLOGY | Age: 70
End: 2020-02-13

## 2020-02-18 RX ORDER — METOPROLOL SUCCINATE 25 MG/1
TABLET, EXTENDED RELEASE ORAL
Qty: 45 TABLET | Refills: 3 | Status: SHIPPED | OUTPATIENT
Start: 2020-02-18 | End: 2020-05-06

## 2020-02-18 RX ORDER — METOPROLOL SUCCINATE 25 MG/1
TABLET, EXTENDED RELEASE ORAL
Qty: 30 TABLET | Refills: 0 | Status: SHIPPED | OUTPATIENT
Start: 2020-02-18 | End: 2020-02-18

## 2020-03-24 ENCOUNTER — HOSPITAL ENCOUNTER (OUTPATIENT)
Dept: PULMONOLOGY | Age: 70
End: 2020-03-24
Payer: MEDICARE

## 2020-03-24 ENCOUNTER — HOSPITAL ENCOUNTER (OUTPATIENT)
Dept: CT IMAGING | Age: 70
Discharge: HOME | End: 2020-03-26
Payer: MEDICARE

## 2020-03-24 PROCEDURE — 71250 CT THORAX DX C-: CPT

## 2020-04-28 ENCOUNTER — TELEPHONE (OUTPATIENT)
Dept: CARDIOLOGY | Age: 70
End: 2020-04-28

## 2020-05-04 ENCOUNTER — TELEPHONE (OUTPATIENT)
Dept: CARDIOLOGY | Age: 70
End: 2020-05-04

## 2020-05-06 ENCOUNTER — OFFICE VISIT (OUTPATIENT)
Dept: CARDIOLOGY | Age: 70
End: 2020-05-06
Payer: MEDICARE

## 2020-05-06 ENCOUNTER — TELEPHONE (OUTPATIENT)
Dept: CARDIOLOGY | Age: 70
End: 2020-05-06

## 2020-05-06 VITALS
OXYGEN SATURATION: 96 % | DIASTOLIC BLOOD PRESSURE: 74 MMHG | BODY MASS INDEX: 31.33 KG/M2 | WEIGHT: 199.6 LBS | HEART RATE: 76 BPM | SYSTOLIC BLOOD PRESSURE: 132 MMHG | HEIGHT: 67 IN | RESPIRATION RATE: 18 BRPM

## 2020-05-06 PROCEDURE — 99214 OFFICE O/P EST MOD 30 MIN: CPT | Performed by: INTERNAL MEDICINE

## 2020-05-06 RX ORDER — METOPROLOL SUCCINATE 25 MG/1
25 TABLET, EXTENDED RELEASE ORAL DAILY
Qty: 90 TABLET | Refills: 3 | Status: CANCELLED | OUTPATIENT
Start: 2020-05-06

## 2020-05-06 RX ORDER — METOPROLOL SUCCINATE 25 MG/1
25 TABLET, EXTENDED RELEASE ORAL DAILY
Qty: 90 TABLET | Refills: 3 | Status: SHIPPED | OUTPATIENT
Start: 2020-05-06 | End: 2020-06-26

## 2020-05-06 NOTE — PROGRESS NOTES
Dede Stark am scribing for and in the presence of Debbie Epperson MD, F.A.C.C..    Subjective:     [facility] / HPI:    Chief Complaint   Patient presents with    Follow-up     Hx: high grade atrioventricular block, CHF, HTN, mitral valve regurg. She was in ED 1/18/20 for pneumonia. Has had some fatigue, dizzy and trouble breathing since being sick. Denies: CP, palpitations. Dear  ELAINA Mendoza - CNP    I had the pleasure of seeing Bimal Mcgraw for follow up today. Anjali Bell is 78 y.o. female with no prior cardiac history who presented today for follow-up. 1-Patient initially seen  In the emergency room on 10/25/2016 with palpitations, chest pain and near syncope. Admission ECG showed supraventricular tachycardia with HR of 210 bpm, converted to normal sinus rhythm with 6 mg of adenosine. She has baseline left bundle branch block on ECG. 2-Patient denies any prior history of CAD, myocardial infarction or heart failure. No prior history of cardiac catheterization. Her CT of the chest showed aortic and coronary artery calcification. 3-Stress test (done at [facility]) showed probably normal myocardial perfusion with no inducible ischemia. There was medium sized fixed perfusion defect at the septum, most likely artifact. Normal ejection fraction and wall motion. 4-Another visit to the emergency room on 12/4/2016 with wide complex tachycardia, treated with amiodarone and transferred to [facility] for further management. 5-Patient S/P ablation of left sided accessory pathway on 2/24/2017. 6-She had an echo on 7/11/2018. Phenotypic picture is suggestive of hypertrophic cardiomyopathy with septal hypertrophy, systolic anterior motion of the mitral valve without resting outflow tract obstruction. Moderate mitral regurgitation and mild diastolic dysfunction.     7-Medtronic loop insertion on 09/14/2018    8-Dizziness and presyncope with documented to limit salt in her diet. Beta Blocker Therapy: Increase Metoprolol succinate (Toprol XL)  25 mg  I discussed the potential side effects of this medication including lightheadedness and dizziness and told her to call the office if this occurs. Calcium Channel Blocker: Continue diltiazem 240 mg once daily. Statin Therapy: Continue atorvastatin (Lipitor) 40 mg nightly. Laboratory testing: I ordered a BMP, CBC, TSH, and hemoglobin A1c to assess her levels. Essential Hypertension: Controlled  Diuretics: Continue furosemide (lasix) 20 mg    Beta Blocker Therapy: Increase Metoprolol succinate (Toprol XL)  25 mg   Calcium Channel Blocker: Continue diltiazem  240 mg daily. Dual Lead Medtronic Pacemaker  Indication for Device Placement: Intermittent high grade AV block  Interrogation Findings: Normal pacemaker function. Short episodes of paroxysmal supraventricular tachycardia recorded, the longest being allowed about 3 minutes on 11/8/2019. Counseled regarding adequate hydration. · Moderate Mitral Regurgitation:   · Beta Blocker: INCREASE to Metoprolol succinate (Toprol XL) 25 mg daily. · Diuretics: Continue current diuretic regimen. No evidence of volume overload. · I advised them to try and keep their legs up whenever possible and to limit salt in their diet. · Additional Testing: I Ordered an Echocardiogram to assess Ms. Jimenes's ejection fraction and to look for significant valvular heart disease as a source of Ms. Carmelo Ortiz symptoms    Finally, I recommended that she continue her other medications and follow up with you as previously scheduled. FOLLOW UP:   I told Ms. Carmelo Ortiz to call my office if she had any problems, but otherwise told her to Return in about 5 months (around 10/6/2020). However, I would be happy to see her sooner should the need arise. Elaina Tucker MD, MS, F.A.C.C.   [facility]) [facility], [address], [place] [address], [address]  Phone: [phone], Fax: [phone]    I believe that the risk of significant morbidity and mortality related to the patient's current medical conditions are: intermediate-high. The documentation recorded by the scribe, accurately and completely reflects the services I personally performed and the decisions made by me. Sue Villeda MD, F.A.C.C.  May 6, 2020

## 2020-05-06 NOTE — TELEPHONE ENCOUNTER
Patient was going to schedule testing and unfortunately [facility] does not accept her insurance. Pt is going to call around and schedule testing somewhere else and look for a cardiologist that accepts her insurance.

## 2020-05-29 ENCOUNTER — TELEPHONE (OUTPATIENT)
Dept: CARDIOLOGY | Age: 70
End: 2020-05-29

## 2020-06-01 ENCOUNTER — TELEPHONE (OUTPATIENT)
Dept: CARDIOLOGY | Age: 70
End: 2020-06-01

## 2020-06-01 NOTE — TELEPHONE ENCOUNTER
Tyrese cox'cuba Schmitt's call from 5/29. She will check with her insurance to see what cardiologist is under her pland and notify our office.

## 2020-09-25 ENCOUNTER — TELEPHONE (OUTPATIENT)
Dept: CARDIOLOGY | Age: 70
End: 2020-09-25

## 2020-09-25 NOTE — TELEPHONE ENCOUNTER
Attempted to call patient to see if she has found a new cardiologist due to alert that was found via pacemaker remote. Patient called back and stated that she now sees Dr. Giovanni Graff since he accepts her insurance. Informed Dr Maggy Olson of alert and informed him of patient transferring. Pt asked that we send over downloads and transfer [facility] to Dr. Noemí Callahan office. Called Kailee John from Dr Otilia Breaux and she asked that we fax the alert to fax number [phone] and to put on cover sheet for nurses to know to accept transfer in [facility].

## 2020-11-03 PROBLEM — I10 ESSENTIAL HYPERTENSION: Status: RESOLVED | Noted: 2020-11-03 | Resolved: 2020-11-03

## 2020-12-09 ENCOUNTER — HOSPITAL ENCOUNTER (OUTPATIENT)
Age: 70
Setting detail: SPECIMEN
Discharge: HOME | End: 2020-12-09
Payer: MEDICARE

## 2020-12-09 LAB
ABSOLUTE EOS #: 0.33 K/UL (ref 0–0.44)
ABSOLUTE IMMATURE GRANULOCYTE: 0.04 K/UL (ref 0–0.3)
ABSOLUTE LYMPH #: 1.64 K/UL (ref 1.1–3.7)
ABSOLUTE MONO #: 0.93 K/UL (ref 0.1–1.2)
ALBUMIN SERPL-MCNC: 4.2 G/DL (ref 3.5–5.2)
ALBUMIN/GLOBULIN RATIO: 1.4 (ref 1–2.5)
ALP BLD-CCNC: 86 U/L (ref 35–104)
ALT SERPL-CCNC: 17 U/L (ref 5–33)
ANION GAP SERPL CALCULATED.3IONS-SCNC: 17 MMOL/L (ref 9–17)
AST SERPL-CCNC: 19 U/L
BASOPHILS # BLD: 1 % (ref 0–2)
BASOPHILS ABSOLUTE: 0.09 K/UL (ref 0–0.2)
BILIRUB SERPL-MCNC: 0.33 MG/DL (ref 0.3–1.2)
BUN BLDV-MCNC: 23 MG/DL (ref 8–23)
BUN/CREAT BLD: ABNORMAL (ref 9–20)
CALCIUM SERPL-MCNC: 9.4 MG/DL (ref 8.6–10.4)
CHLORIDE BLD-SCNC: 104 MMOL/L (ref 98–107)
CHOLESTEROL, FASTING: 186 MG/DL
CHOLESTEROL/HDL RATIO: 4.7
CO2: 24 MMOL/L (ref 20–31)
CREAT SERPL-MCNC: 1.23 MG/DL (ref 0.5–0.9)
DIFFERENTIAL TYPE: ABNORMAL
EOSINOPHILS RELATIVE PERCENT: 4 % (ref 1–4)
FOLATE: >20 NG/ML
GFR AFRICAN AMERICAN: 53 ML/MIN
GFR NON-AFRICAN AMERICAN: 43 ML/MIN
GFR SERPL CREATININE-BSD FRML MDRD: ABNORMAL ML/MIN/{1.73_M2}
GFR SERPL CREATININE-BSD FRML MDRD: ABNORMAL ML/MIN/{1.73_M2}
GLUCOSE BLD-MCNC: 116 MG/DL (ref 70–99)
HCT VFR BLD CALC: 43.4 % (ref 36.3–47.1)
HDLC SERPL-MCNC: 40 MG/DL
HEMOGLOBIN: 13.3 G/DL (ref 11.9–15.1)
IMMATURE GRANULOCYTES: 1 %
LDL CHOLESTEROL: 90 MG/DL (ref 0–130)
LYMPHOCYTES # BLD: 19 % (ref 24–43)
MCH RBC QN AUTO: 30.6 PG (ref 25.2–33.5)
MCHC RBC AUTO-ENTMCNC: 30.6 G/DL (ref 28.4–34.8)
MCV RBC AUTO: 99.8 FL (ref 82.6–102.9)
MONOCYTES # BLD: 11 % (ref 3–12)
NRBC AUTOMATED: 0 PER 100 WBC
PDW BLD-RTO: 13.7 % (ref 11.8–14.4)
PLATELET # BLD: 320 K/UL (ref 138–453)
PLATELET ESTIMATE: ABNORMAL
PMV BLD AUTO: 10.8 FL (ref 8.1–13.5)
POTASSIUM SERPL-SCNC: 4.7 MMOL/L (ref 3.7–5.3)
RBC # BLD: 4.35 M/UL (ref 3.95–5.11)
RBC # BLD: ABNORMAL 10*6/UL
SEG NEUTROPHILS: 64 % (ref 36–65)
SEGMENTED NEUTROPHILS ABSOLUTE COUNT: 5.75 K/UL (ref 1.5–8.1)
SODIUM BLD-SCNC: 145 MMOL/L (ref 135–144)
TOTAL PROTEIN: 7.1 G/DL (ref 6.4–8.3)
TRIGLYCERIDE, FASTING: 278 MG/DL
TSH SERPL DL<=0.05 MIU/L-ACNC: 3.63 MIU/L (ref 0.3–5)
VITAMIN B-12: 776 PG/ML (ref 232–1245)
VITAMIN D 25-HYDROXY: 32 NG/ML (ref 30–100)
VLDLC SERPL CALC-MCNC: ABNORMAL MG/DL (ref 1–30)
WBC # BLD: 8.8 K/UL (ref 3.5–11.3)
WBC # BLD: ABNORMAL 10*3/UL

## 2021-02-03 ENCOUNTER — HOSPITAL ENCOUNTER (OUTPATIENT)
Dept: WOMENS IMAGING | Age: 71
Discharge: HOME | End: 2021-02-05
Payer: MEDICARE

## 2021-02-03 DIAGNOSIS — Z12.31 BREAST CANCER SCREENING BY MAMMOGRAM: ICD-10-CM

## 2021-02-03 DIAGNOSIS — Z13.820 SCREENING FOR OSTEOPOROSIS: ICD-10-CM

## 2021-02-03 PROCEDURE — 77080 DXA BONE DENSITY AXIAL: CPT

## 2021-02-03 PROCEDURE — 77063 BREAST TOMOSYNTHESIS BI: CPT

## 2021-03-23 ENCOUNTER — TELEPHONE (OUTPATIENT)
Dept: PHARMACY | Facility: CLINIC | Age: 71
End: 2021-03-23

## 2021-03-23 NOTE — TELEPHONE ENCOUNTER
POPULATION HEALTH CLINICAL PHARMACY REVIEW: ADHERENCE REVIEW  Identified care gap per [employer]; fills at [address]: ACE/ARB adherence    Last Visit: PCP does not appear to be within Epic; prescriber [facility] Appears attributed to [facility]. Per 9/25/2020 telephone encounter appears pt has now established with new cardiologist.    Patient also appears to be prescribed: atorvastatin 40 mg? Metformin in the past but no longer prescribed? Patient found in Outcomes MTM and is not currently eligible for CMR/TIP    ASSESSMENT  ACE/ARB ADHERENCE    Per Insurance Records through 3/6/21 (2020 [facility] = n/a; YTD PDC = 52% - one fill):   Lisinopril 2.5 mg tabs last filled on 1/8/21 for 30 day supply. Next refill due: 2/7/21    Per Outcomes MTM Records:  Lisinopril 2.5 mg tabs last filled on 1/8/21, 12/8/21, 10/22/21 for 30 day supply (30 tabs). Per [place] [address]:   Lisinopril last picked up on 1/8/21 for 30 day supply. Refills remaining, they could do 90 day supply if patient preferred. BP Readings from Last 3 Encounters:   05/06/20 132/74   02/03/20 126/79   01/21/20 (!) 141/84     CrCl cannot be calculated (Unknown ideal weight.). [facility]    Per Insurance Records through 3/6/21: n/a no claims yet in 2021    Per Outcomes MTM Records:  Atorvastatin 40 mg tabs last filled on 10/8/20 for 90 day supply (90 tabs).      Per [place] [address]:   No atorvastatin Rx on file    Lab Results   Component Value Date    CHOL 174 05/16/2018    TRIG 334 (H) 05/16/2018    HDL 40 (L) 12/09/2020    LDLCHOLESTEROL 90 12/09/2020     ALT   Date Value Ref Range Status   12/09/2020 17 5 - 33 U/L Final     AST   Date Value Ref Range Status   12/09/2020 19 <32 U/L Final     The 10-year ASCVD risk score (Maru Rivers, et al., 2013) is: 21.1%    Values used to calculate the score:      Age: 78 years      Sex: Female Is Non- : No      Diabetic: Yes      Tobacco smoker: No      Systolic Blood Pressure: 650 mmHg      Is BP treated: Yes      HDL Cholesterol: 40 mg/dL      Total Cholesterol: 186 mg/dL     PLAN  The following are interventions that have been identified:   - Patient overdue refilling lisinopril. Unsure if patient is still prescribed this medication. One fill in 2021  - Patient eligible for 90 day supply of lisinopril  - Unsure if patient is taking atorvastatin? (no fills in 2021)  - appears pt now seeing outside providers, will need to notify insurance to update PCP information    Attempt made to reach patient. Someone answered phone and then immediately call disconnected. Will send [employer] message. No future appointments.     Andree Nelson, PharmD, [facility]  Department, toll free: [phone], option 7     For Pharmacy Admin Tracking Only    PHSO: Yes  Time Spent (min): 10

## 2021-03-23 NOTE — LETTER
[address]  [address], [place] [address]  Phone: toll free [phone] option [address]  [address] [place] [address] Drive [address]           3/23/21    Dear Yadiel Tilley     We tried to reach you recently regarding your lisinopril, but were unable to reach you on the telephone.     It appears that this medication has not been filled at proper times. We are worried you might be missing doses or not taking it as directed. It is important that you take your medications regularly and try not to miss a single dose.     We have on file that you are currently taking lisinopril 2.5 mg daily. If you are no longer taking it or taking it differently than above, please call us at the number below so that we can discuss this and update your medication profile.     Additionally, it appears that your care is no longer being managed by a [facility]) provider. If you have not already called your health insurance company [facility]) and provided them with the name of your new primary care provider, please do so as soon as possible.  It is important that your health insurance company has your updated primary care provider on file so that they are able to communicate with your provider if necessary.      If you have any questions, please feel free to contact us.      Sincerely,      Rachel Hagen, PharmD, [facility]  Department, toll free: [phone], option 7

## 2021-09-24 ENCOUNTER — HOSPITAL ENCOUNTER (OUTPATIENT)
Age: 71
Setting detail: SPECIMEN
Discharge: HOME | End: 2021-09-24
Payer: MEDICARE

## 2021-09-24 LAB
ABSOLUTE EOS #: 0.46 K/UL (ref 0–0.44)
ABSOLUTE IMMATURE GRANULOCYTE: 0.03 K/UL (ref 0–0.3)
ABSOLUTE LYMPH #: 1.4 K/UL (ref 1.1–3.7)
ABSOLUTE MONO #: 0.73 K/UL (ref 0.1–1.2)
ALBUMIN SERPL-MCNC: 4.4 G/DL (ref 3.5–5.2)
ALBUMIN/GLOBULIN RATIO: 1.7 (ref 1–2.5)
ALP BLD-CCNC: 85 U/L (ref 35–104)
ALT SERPL-CCNC: 17 U/L (ref 5–33)
ANION GAP SERPL CALCULATED.3IONS-SCNC: 14 MMOL/L (ref 9–17)
AST SERPL-CCNC: 20 U/L
BASOPHILS # BLD: 1 % (ref 0–2)
BASOPHILS ABSOLUTE: 0.1 K/UL (ref 0–0.2)
BILIRUB SERPL-MCNC: 0.31 MG/DL (ref 0.3–1.2)
BUN BLDV-MCNC: 18 MG/DL (ref 8–23)
BUN/CREAT BLD: ABNORMAL (ref 9–20)
CALCIUM SERPL-MCNC: 9 MG/DL (ref 8.6–10.4)
CHLORIDE BLD-SCNC: 101 MMOL/L (ref 98–107)
CHOLESTEROL, FASTING: 201 MG/DL
CHOLESTEROL/HDL RATIO: 6.1
CO2: 25 MMOL/L (ref 20–31)
CREAT SERPL-MCNC: 0.97 MG/DL (ref 0.5–0.9)
DIFFERENTIAL TYPE: ABNORMAL
EOSINOPHILS RELATIVE PERCENT: 6 % (ref 1–4)
FOLATE: >20 NG/ML
GFR AFRICAN AMERICAN: >60 ML/MIN
GFR NON-AFRICAN AMERICAN: 57 ML/MIN
GFR SERPL CREATININE-BSD FRML MDRD: ABNORMAL ML/MIN/{1.73_M2}
GFR SERPL CREATININE-BSD FRML MDRD: ABNORMAL ML/MIN/{1.73_M2}
GLUCOSE BLD-MCNC: 97 MG/DL (ref 70–99)
HCT VFR BLD CALC: 44.2 % (ref 36.3–47.1)
HDLC SERPL-MCNC: 33 MG/DL
HEMOGLOBIN: 13.5 G/DL (ref 11.9–15.1)
IMMATURE GRANULOCYTES: 0 %
LDL CHOLESTEROL DIRECT: 84 MG/DL
LDL CHOLESTEROL: ABNORMAL MG/DL (ref 0–130)
LYMPHOCYTES # BLD: 19 % (ref 24–43)
MCH RBC QN AUTO: 30.5 PG (ref 25.2–33.5)
MCHC RBC AUTO-ENTMCNC: 30.5 G/DL (ref 28.4–34.8)
MCV RBC AUTO: 99.8 FL (ref 82.6–102.9)
MONOCYTES # BLD: 10 % (ref 3–12)
NRBC AUTOMATED: 0 PER 100 WBC
PDW BLD-RTO: 14.5 % (ref 11.8–14.4)
PLATELET # BLD: 263 K/UL (ref 138–453)
PLATELET ESTIMATE: ABNORMAL
PMV BLD AUTO: 10.9 FL (ref 8.1–13.5)
POTASSIUM SERPL-SCNC: 4.2 MMOL/L (ref 3.7–5.3)
RBC # BLD: 4.43 M/UL (ref 3.95–5.11)
RBC # BLD: ABNORMAL 10*6/UL
SEG NEUTROPHILS: 64 % (ref 36–65)
SEGMENTED NEUTROPHILS ABSOLUTE COUNT: 4.78 K/UL (ref 1.5–8.1)
SODIUM BLD-SCNC: 140 MMOL/L (ref 135–144)
TOTAL PROTEIN: 7 G/DL (ref 6.4–8.3)
TRIGLYCERIDE, FASTING: 494 MG/DL
TSH SERPL DL<=0.05 MIU/L-ACNC: 4.22 MIU/L (ref 0.3–5)
VITAMIN B-12: 868 PG/ML (ref 232–1245)
VITAMIN D 25-HYDROXY: 49.7 NG/ML (ref 30–100)
VLDLC SERPL CALC-MCNC: ABNORMAL MG/DL (ref 1–30)
WBC # BLD: 7.5 K/UL (ref 3.5–11.3)
WBC # BLD: ABNORMAL 10*3/UL

## 2022-08-26 ENCOUNTER — HOSPITAL ENCOUNTER (OUTPATIENT)
Age: 72
Setting detail: SPECIMEN
Discharge: HOME | End: 2022-08-26

## 2022-08-26 LAB
ABSOLUTE EOS #: 0.38 K/UL (ref 0–0.44)
ABSOLUTE IMMATURE GRANULOCYTE: 0.03 K/UL (ref 0–0.3)
ABSOLUTE LYMPH #: 1.06 K/UL (ref 1.1–3.7)
ABSOLUTE MONO #: 0.74 K/UL (ref 0.1–1.2)
ALBUMIN SERPL-MCNC: 4.6 G/DL (ref 3.5–5.2)
ALBUMIN/GLOBULIN RATIO: 1.9 (ref 1–2.5)
ALP BLD-CCNC: 84 U/L (ref 35–104)
ALT SERPL-CCNC: 16 U/L (ref 5–33)
ANION GAP SERPL CALCULATED.3IONS-SCNC: 14 MMOL/L (ref 9–17)
AST SERPL-CCNC: 19 U/L
BASOPHILS # BLD: 1 % (ref 0–2)
BASOPHILS ABSOLUTE: 0.09 K/UL (ref 0–0.2)
BILIRUB SERPL-MCNC: 0.33 MG/DL (ref 0.3–1.2)
BUN BLDV-MCNC: 29 MG/DL (ref 8–23)
CALCIUM SERPL-MCNC: 9.5 MG/DL (ref 8.6–10.4)
CHLORIDE BLD-SCNC: 99 MMOL/L (ref 98–107)
CHOLESTEROL, FASTING: 216 MG/DL
CHOLESTEROL/HDL RATIO: 4.9
CO2: 26 MMOL/L (ref 20–31)
CREAT SERPL-MCNC: 1.17 MG/DL (ref 0.5–0.9)
EOSINOPHILS RELATIVE PERCENT: 5 % (ref 1–4)
GFR AFRICAN AMERICAN: 55 ML/MIN
GFR NON-AFRICAN AMERICAN: 46 ML/MIN
GFR SERPL CREATININE-BSD FRML MDRD: ABNORMAL ML/MIN/{1.73_M2}
GLUCOSE BLD-MCNC: 97 MG/DL (ref 70–99)
HCT VFR BLD CALC: 42.2 % (ref 36.3–47.1)
HDLC SERPL-MCNC: 44 MG/DL
HEMOGLOBIN: 13.3 G/DL (ref 11.9–15.1)
IMMATURE GRANULOCYTES: 0 %
LDL CHOLESTEROL: 97 MG/DL (ref 0–130)
LYMPHOCYTES # BLD: 15 % (ref 24–43)
MCH RBC QN AUTO: 31.2 PG (ref 25.2–33.5)
MCHC RBC AUTO-ENTMCNC: 31.5 G/DL (ref 28.4–34.8)
MCV RBC AUTO: 99.1 FL (ref 82.6–102.9)
MONOCYTES # BLD: 10 % (ref 3–12)
NRBC AUTOMATED: 0 PER 100 WBC
PDW BLD-RTO: 13.5 % (ref 11.8–14.4)
PLATELET # BLD: 243 K/UL (ref 138–453)
PMV BLD AUTO: 11.1 FL (ref 8.1–13.5)
POTASSIUM SERPL-SCNC: 4.8 MMOL/L (ref 3.7–5.3)
RBC # BLD: 4.26 M/UL (ref 3.95–5.11)
SEG NEUTROPHILS: 69 % (ref 36–65)
SEGMENTED NEUTROPHILS ABSOLUTE COUNT: 4.98 K/UL (ref 1.5–8.1)
SODIUM BLD-SCNC: 139 MMOL/L (ref 135–144)
TOTAL PROTEIN: 7 G/DL (ref 6.4–8.3)
TRIGLYCERIDE, FASTING: 373 MG/DL
TSH SERPL DL<=0.05 MIU/L-ACNC: 4.38 UIU/ML (ref 0.3–5)
WBC # BLD: 7.3 K/UL (ref 3.5–11.3)

## 2023-01-03 ENCOUNTER — HOSPITAL ENCOUNTER (OUTPATIENT)
Age: 73
Setting detail: SPECIMEN
Discharge: HOME | End: 2023-01-03

## 2023-01-04 LAB
ANION GAP SERPL CALCULATED.3IONS-SCNC: 14 MMOL/L (ref 9–17)
BUN BLDV-MCNC: 15 MG/DL (ref 8–23)
CALCIUM SERPL-MCNC: 8.5 MG/DL (ref 8.6–10.4)
CHLORIDE BLD-SCNC: 99 MMOL/L (ref 98–107)
CO2: 25 MMOL/L (ref 20–31)
CREAT SERPL-MCNC: 0.89 MG/DL (ref 0.5–0.9)
GFR SERPL CREATININE-BSD FRML MDRD: >60 ML/MIN/1.73M2
GLUCOSE BLD-MCNC: 124 MG/DL (ref 70–99)
POTASSIUM SERPL-SCNC: 4.5 MMOL/L (ref 3.7–5.3)
SODIUM BLD-SCNC: 138 MMOL/L (ref 135–144)

## 2023-02-17 ENCOUNTER — HOSPITAL ENCOUNTER (OUTPATIENT)
Age: 73
Setting detail: SPECIMEN
Discharge: HOME | End: 2023-02-17

## 2023-02-18 LAB
ANION GAP SERPL CALCULATED.3IONS-SCNC: 15 MMOL/L (ref 9–17)
BUN SERPL-MCNC: 23 MG/DL (ref 8–23)
CALCIUM SERPL-MCNC: 9.3 MG/DL (ref 8.6–10.4)
CHLORIDE SERPL-SCNC: 101 MMOL/L (ref 98–107)
CO2 SERPL-SCNC: 25 MMOL/L (ref 20–31)
CREAT SERPL-MCNC: 0.93 MG/DL (ref 0.5–0.9)
GFR SERPL CREATININE-BSD FRML MDRD: >60 ML/MIN/1.73M2
GLUCOSE SERPL-MCNC: 87 MG/DL (ref 70–99)
POTASSIUM SERPL-SCNC: 4.1 MMOL/L (ref 3.7–5.3)
SODIUM SERPL-SCNC: 141 MMOL/L (ref 135–144)

## 2023-07-07 ENCOUNTER — HOSPITAL ENCOUNTER (OUTPATIENT)
Dept: MAMMOGRAPHY | Age: 73
End: 2023-07-07
Payer: MEDICARE

## 2023-07-07 DIAGNOSIS — Z12.31 ENCOUNTER FOR SCREENING MAMMOGRAM FOR MALIGNANT NEOPLASM OF BREAST: ICD-10-CM

## 2023-07-07 PROCEDURE — 77063 BREAST TOMOSYNTHESIS BI: CPT

## 2023-09-06 ENCOUNTER — HOSPITAL ENCOUNTER (OUTPATIENT)
Age: 73
Setting detail: SPECIMEN
Discharge: HOME | End: 2023-09-06

## 2023-09-07 LAB
ALBUMIN SERPL-MCNC: 4.4 G/DL (ref 3.5–5.2)
ALBUMIN/GLOB SERPL: 2 {RATIO} (ref 1–2.5)
ALP SERPL-CCNC: 85 U/L (ref 35–104)
ALT SERPL-CCNC: 11 U/L (ref 5–33)
ANION GAP SERPL CALCULATED.3IONS-SCNC: 13 MMOL/L (ref 9–17)
AST SERPL-CCNC: 13 U/L
BASOPHILS # BLD: 0.07 K/UL (ref 0–0.2)
BASOPHILS NFR BLD: 1 % (ref 0–2)
BILIRUB SERPL-MCNC: 0.2 MG/DL (ref 0.3–1.2)
BUN SERPL-MCNC: 11 MG/DL (ref 8–23)
CALCIUM SERPL-MCNC: 8.7 MG/DL (ref 8.6–10.4)
CHLORIDE SERPL-SCNC: 104 MMOL/L (ref 98–107)
CHOLEST SERPL-MCNC: 167 MG/DL
CHOLESTEROL/HDL RATIO: 4.1
CO2 SERPL-SCNC: 25 MMOL/L (ref 20–31)
CREAT SERPL-MCNC: 0.9 MG/DL (ref 0.5–0.9)
EOSINOPHIL # BLD: 0.23 K/UL (ref 0–0.44)
EOSINOPHILS RELATIVE PERCENT: 4 % (ref 1–4)
ERYTHROCYTE [DISTWIDTH] IN BLOOD BY AUTOMATED COUNT: 13.8 % (ref 11.8–14.4)
GFR SERPL CREATININE-BSD FRML MDRD: >60 ML/MIN/1.73M2
GLUCOSE SERPL-MCNC: 96 MG/DL (ref 70–99)
HCT VFR BLD AUTO: 41.2 % (ref 36.3–47.1)
HDLC SERPL-MCNC: 41 MG/DL
HGB BLD-MCNC: 13 G/DL (ref 11.9–15.1)
IMM GRANULOCYTES # BLD AUTO: <0.03 K/UL (ref 0–0.3)
IMM GRANULOCYTES NFR BLD: 0 %
LDLC SERPL CALC-MCNC: 72 MG/DL (ref 0–130)
LYMPHOCYTES NFR BLD: 1.12 K/UL (ref 1.1–3.7)
LYMPHOCYTES RELATIVE PERCENT: 20 % (ref 24–43)
MCH RBC QN AUTO: 31.1 PG (ref 25.2–33.5)
MCHC RBC AUTO-ENTMCNC: 31.6 G/DL (ref 28.4–34.8)
MCV RBC AUTO: 98.6 FL (ref 82.6–102.9)
MONOCYTES NFR BLD: 0.64 K/UL (ref 0.1–1.2)
MONOCYTES NFR BLD: 11 % (ref 3–12)
NEUTROPHILS NFR BLD: 64 % (ref 36–65)
NEUTS SEG NFR BLD: 3.59 K/UL (ref 1.5–8.1)
NRBC BLD-RTO: 0 PER 100 WBC
PLATELET # BLD AUTO: 246 K/UL (ref 138–453)
PMV BLD AUTO: 10.8 FL (ref 8.1–13.5)
POTASSIUM SERPL-SCNC: 4.2 MMOL/L (ref 3.7–5.3)
PROT SERPL-MCNC: 6.6 G/DL (ref 6.4–8.3)
RBC # BLD AUTO: 4.18 M/UL (ref 3.95–5.11)
SODIUM SERPL-SCNC: 142 MMOL/L (ref 135–144)
TRIGL SERPL-MCNC: 269 MG/DL
TSH SERPL DL<=0.05 MIU/L-ACNC: 3.4 UIU/ML (ref 0.3–5)
WBC OTHER # BLD: 5.7 K/UL (ref 3.5–11.3)

## 2023-09-21 ENCOUNTER — HOSPITAL ENCOUNTER (OUTPATIENT)
Dept: PULMONOLOGY | Age: 73
Discharge: HOME | End: 2023-09-21
Payer: MEDICARE

## 2023-09-21 VITALS — RESPIRATION RATE: 18 BRPM | HEART RATE: 74 BPM | OXYGEN SATURATION: 95 %

## 2023-09-21 DIAGNOSIS — R06.00 DYSPNEA, UNSPECIFIED TYPE: ICD-10-CM

## 2023-09-21 PROCEDURE — 94060 EVALUATION OF WHEEZING: CPT

## 2023-09-21 PROCEDURE — 6370000000 HC RX 637 (ALT 250 FOR IP): Performed by: INTERNAL MEDICINE

## 2023-09-21 PROCEDURE — 94729 DIFFUSING CAPACITY: CPT

## 2023-09-21 PROCEDURE — 94726 PLETHYSMOGRAPHY LUNG VOLUMES: CPT

## 2023-09-21 PROCEDURE — 94664 DEMO&/EVAL PT USE INHALER: CPT

## 2023-09-21 RX ORDER — ALBUTEROL SULFATE 90 UG/1
4 AEROSOL, METERED RESPIRATORY (INHALATION) ONCE
Status: COMPLETED | OUTPATIENT
Start: 2023-09-21 | End: 2023-09-21

## 2023-09-21 RX ADMIN — ALBUTEROL SULFATE 4 PUFF: 90 AEROSOL, METERED RESPIRATORY (INHALATION) at 09:40

## 2023-10-17 PROBLEM — I35.0 MILD AORTIC STENOSIS: Status: ACTIVE | Noted: 2023-10-17

## 2023-10-17 PROBLEM — I48.3 TYPICAL ATRIAL FLUTTER (HCC): Status: ACTIVE | Noted: 2023-10-17

## 2023-10-17 PROBLEM — I42.2 HYPERTROPHIC CARDIOMYOPATHY (HCC): Status: ACTIVE | Noted: 2023-10-17

## 2023-10-17 PROBLEM — Z98.890 H/O CARDIAC RADIOFREQUENCY ABLATION: Status: ACTIVE | Noted: 2023-10-17

## 2023-10-17 PROBLEM — Z95.0 PACEMAKER: Status: ACTIVE | Noted: 2023-10-17

## 2023-11-28 ENCOUNTER — OFFICE VISIT (OUTPATIENT)
Dept: OPERATING ROOM | Age: 73
End: 2023-11-28
Attending: INTERNAL MEDICINE

## 2023-11-28 ENCOUNTER — HOSPITAL ENCOUNTER (OUTPATIENT)
Age: 73
Setting detail: OUTPATIENT SURGERY
Discharge: HOME | End: 2023-11-30
Attending: INTERNAL MEDICINE
Payer: COMMERCIAL

## 2023-11-28 VITALS
HEART RATE: 71 BPM | TEMPERATURE: 97.8 F | OXYGEN SATURATION: 92 % | RESPIRATION RATE: 16 BRPM | HEIGHT: 67 IN | DIASTOLIC BLOOD PRESSURE: 63 MMHG | SYSTOLIC BLOOD PRESSURE: 101 MMHG | WEIGHT: 189 LBS | BODY MASS INDEX: 29.66 KG/M2

## 2023-11-28 DIAGNOSIS — I48.3 TYPICAL ATRIAL FLUTTER (HCC): Primary | ICD-10-CM

## 2023-11-28 DIAGNOSIS — R93.1 ABNORMAL ECHOCARDIOGRAM: ICD-10-CM

## 2023-11-28 LAB
BUN BLD-MCNC: 20 MG/DL (ref 8–26)
CHLORIDE BLD-SCNC: 104 MMOL/L (ref 98–107)
ECHO BSA: 2.01 M2
EGFR, POC: >60 ML/MIN/1.73M2
GLUCOSE BLD-MCNC: 121 MG/DL (ref 74–100)
HCT VFR BLD AUTO: 45 % (ref 36–46)
POC CREATININE: 0.8 MG/DL (ref 0.51–1.19)
POC HEMOGLOBIN (CALC): 15.4 G/DL (ref 12–16)
POTASSIUM BLD-SCNC: 4.3 MMOL/L (ref 3.5–4.5)
SODIUM BLD-SCNC: 142 MMOL/L (ref 138–146)

## 2023-11-28 PROCEDURE — 93325 DOPPLER ECHO COLOR FLOW MAPG: CPT | Performed by: INTERNAL MEDICINE

## 2023-11-28 PROCEDURE — 84295 ASSAY OF SERUM SODIUM: CPT

## 2023-11-28 PROCEDURE — 7100000010 HC PHASE II RECOVERY - FIRST 15 MIN: Performed by: STUDENT IN AN ORGANIZED HEALTH CARE EDUCATION/TRAINING PROGRAM

## 2023-11-28 PROCEDURE — 6370000000 HC RX 637 (ALT 250 FOR IP): Performed by: STUDENT IN AN ORGANIZED HEALTH CARE EDUCATION/TRAINING PROGRAM

## 2023-11-28 PROCEDURE — 84132 ASSAY OF SERUM POTASSIUM: CPT

## 2023-11-28 PROCEDURE — 85014 HEMATOCRIT: CPT

## 2023-11-28 PROCEDURE — 93312 ECHO TRANSESOPHAGEAL: CPT | Performed by: INTERNAL MEDICINE

## 2023-11-28 PROCEDURE — 2580000003 HC RX 258: Performed by: INTERNAL MEDICINE

## 2023-11-28 PROCEDURE — 99152 MOD SED SAME PHYS/QHP 5/>YRS: CPT | Performed by: STUDENT IN AN ORGANIZED HEALTH CARE EDUCATION/TRAINING PROGRAM

## 2023-11-28 PROCEDURE — 82947 ASSAY GLUCOSE BLOOD QUANT: CPT

## 2023-11-28 PROCEDURE — 7100000011 HC PHASE II RECOVERY - ADDTL 15 MIN: Performed by: STUDENT IN AN ORGANIZED HEALTH CARE EDUCATION/TRAINING PROGRAM

## 2023-11-28 PROCEDURE — 82565 ASSAY OF CREATININE: CPT

## 2023-11-28 PROCEDURE — 82435 ASSAY OF BLOOD CHLORIDE: CPT

## 2023-11-28 PROCEDURE — 93312 ECHO TRANSESOPHAGEAL: CPT

## 2023-11-28 PROCEDURE — 6360000002 HC RX W HCPCS: Performed by: INTERNAL MEDICINE

## 2023-11-28 PROCEDURE — 99222 1ST HOSP IP/OBS MODERATE 55: CPT | Performed by: INTERNAL MEDICINE

## 2023-11-28 PROCEDURE — 84520 ASSAY OF UREA NITROGEN: CPT

## 2023-11-28 RX ORDER — FENTANYL CITRATE 50 UG/ML
INJECTION, SOLUTION INTRAMUSCULAR; INTRAVENOUS PRN
Status: COMPLETED | OUTPATIENT
Start: 2023-11-28 | End: 2023-11-28

## 2023-11-28 RX ORDER — SODIUM CHLORIDE 9 MG/ML
INJECTION, SOLUTION INTRAVENOUS CONTINUOUS
Status: DISCONTINUED | OUTPATIENT
Start: 2023-11-28 | End: 2023-12-01 | Stop reason: HOSPADM

## 2023-11-28 RX ORDER — SODIUM CHLORIDE 0.9 % (FLUSH) 0.9 %
5-40 SYRINGE (ML) INJECTION EVERY 12 HOURS SCHEDULED
Status: DISCONTINUED | OUTPATIENT
Start: 2023-11-28 | End: 2023-12-01 | Stop reason: HOSPADM

## 2023-11-28 RX ORDER — LIDOCAINE HYDROCHLORIDE 20 MG/ML
SOLUTION OROPHARYNGEAL PRN
Status: COMPLETED | OUTPATIENT
Start: 2023-11-28 | End: 2023-11-28

## 2023-11-28 RX ORDER — SODIUM CHLORIDE 9 MG/ML
INJECTION, SOLUTION INTRAVENOUS PRN
Status: DISCONTINUED | OUTPATIENT
Start: 2023-11-28 | End: 2023-12-01 | Stop reason: HOSPADM

## 2023-11-28 RX ORDER — MIDAZOLAM HYDROCHLORIDE 1 MG/ML
INJECTION INTRAMUSCULAR; INTRAVENOUS PRN
Status: COMPLETED | OUTPATIENT
Start: 2023-11-28 | End: 2023-11-28

## 2023-11-28 RX ORDER — SODIUM CHLORIDE 0.9 % (FLUSH) 0.9 %
5-40 SYRINGE (ML) INJECTION PRN
Status: DISCONTINUED | OUTPATIENT
Start: 2023-11-28 | End: 2023-12-01 | Stop reason: HOSPADM

## 2023-11-28 RX ADMIN — MIDAZOLAM 2 MG: 1 INJECTION INTRAMUSCULAR; INTRAVENOUS at 11:12

## 2023-11-28 RX ADMIN — FENTANYL CITRATE 25 MCG: 50 INJECTION, SOLUTION INTRAMUSCULAR; INTRAVENOUS at 11:12

## 2023-11-28 RX ADMIN — LIDOCAINE HYDROCHLORIDE 15 ML: 20 SOLUTION ORAL; TOPICAL at 11:03

## 2023-11-28 RX ADMIN — MIDAZOLAM 1 MG: 1 INJECTION INTRAMUSCULAR; INTRAVENOUS at 11:15

## 2023-11-28 RX ADMIN — BENZOCAINE, BUTAMBEN, AND TETRACAINE HYDROCHLORIDE 3 SPRAY: .028; .004; .004 AEROSOL, SPRAY TOPICAL at 11:13

## 2023-11-28 RX ADMIN — LIDOCAINE HYDROCHLORIDE 5 ML: 20 SOLUTION ORAL; TOPICAL at 11:12

## 2023-11-28 RX ADMIN — SODIUM CHLORIDE: 9 INJECTION, SOLUTION INTRAVENOUS at 10:40

## 2023-11-28 NOTE — PROGRESS NOTES
All discharge instructions reviewed, questions answered, paper signed and given copy. Patient discharged per w/c with  and belongings.

## 2023-11-28 NOTE — H&P
[facility] Card[facility] Cardiology   History & Physical               Today's Date: 11/28/2023  Patient Name: Micheline Hurt  Date of admission: No admission date for patient encounter. Patient's age: 72 y.o., 1950  Admission Dx: Abnormal echocardiogram [R93.1]    Reason for Admission:  BRIANA    CHIEF COMPLAINT:  Aortic valve evaluation    History Obtained From:  patient, electronic medical record    HISTORY OF PRESENT ILLNESS:      The patient is a 72 y.o. female who is admitted to the hospital for BRIANA. The patient was evaluated by Dr Julissa Carroll 10/17/2023. Patient has history of AS. He underwent echo for evaluation and was noted to have increased gradients. Scheduled for BRIANA for further evaluation    Past Medical History:   has a past medical history of Allergic rhinitis, Arrhythmia, Atrial fibrillation ([address]), Diabetes mellitus ([address]), Facial weakness, H/O cardiovascular stress test, H/O echocardiogram, H/O echocardiogram, History of Holter monitoring, Hypertension, Psoriasis, S/P ablation of atrial flutter, and Trochanteric bursitis. Past Surgical History:   has a past surgical history that includes Hysterectomy (1978); Toe Surgery (10 - 15 years ago); Umbilical hernia repair (03/12); hernia repair; Appendectomy (1970'S); Ankle surgery (Right, 02/25/2017); Ankle fracture surgery (Right, 02/25/2017); Ankle fracture surgery (Right, 2/25/2017); and Pacemaker insertion (09/14/2018). Home Medications:    Prior to Admission medications    Medication Sig Start Date End Date Taking?  Authorizing Provider   benzonatate (TESSALON) 100 MG capsule Take by mouth 5/12/23   Edmond Kelly MD   furosemide (LASIX) 20 MG tablet TAKE ONE TABLET BY MOUTH TWICE DAILY 6/26/20   Blaise Butler MD   metoprolol succinate (TOPROL XL) 25 MG extended release tablet TAKE ONE HALF TABLET BY MOUTH EVERY DAY 6/26/20   Blaise Butler MD   cetirizine (ZYRTEC) 10 MG tablet Take 1 tablet by mouth daily    Edmond Kelly MD

## 2023-11-28 NOTE — PROGRESS NOTES
TRANSFER - OUT REPORT:    Verbal report given to Mario alexis) on Florentin Joy being transferred to [facility](unit) for routine progression of patient care       Report consisted of patient's Situation, Background, Assessment and   Recommendations(SBAR). Information from the following report(s) Nurse Handoff Report was reviewed with the receiving nurse. Opportunity for questions and clarification was provided.       Patient transported with:   Registered Nurse

## 2023-11-28 NOTE — PROGRESS NOTES
Received post BRIANA to pcc room 11 awake and remains NPO. Vitals to monitor. Side rails up with call light to reach /  at bedside. O2 sats 88% / no distress / oxygen on 2 liters.

## 2023-11-28 NOTE — PROGRESS NOTES
Patient admitted, consent signed and questions answered. Patient ready for procedure. Call light to reach with side rails up 2 of 2.  at bedside with patient. History and physical needs completed. [place]

## 2023-11-28 NOTE — PROGRESS NOTES
Gag reflex checked with tongue blade and sip of water. Francine well. Ambulated to cason,voided and returned to room. Patient with no complaints. Patient instructed to follow up with Dr Prasanth Fowler in 2 weeks per Dr Sylvia Patrick.

## 2023-11-29 LAB
ECHO BSA: 2.01 M2
ECHO MV EROA PISA: 0.4 CM2
ECHO MV MAX VELOCITY: 1 M/S
ECHO MV MEAN GRADIENT: 2 MMHG
ECHO MV MEAN VELOCITY: 0.7 M/S
ECHO MV PEAK GRADIENT: 4 MMHG
ECHO MV REGURGITANT ALIASING (NYQUIST) VELOCITY: 31 CM/S
ECHO MV REGURGITANT PEAK GRADIENT: 108 MMHG
ECHO MV REGURGITANT PEAK VELOCITY: 5.2 M/S
ECHO MV REGURGITANT RADIUS PISA: 1 CM
ECHO MV REGURGITANT VOLUME PISA: 59.15 ML
ECHO MV REGURGITANT VTIA: 158 CM
ECHO MV VTI: 29 CM

## 2024-02-21 ENCOUNTER — HOSPITAL ENCOUNTER (OUTPATIENT)
Age: 74
Discharge: HOME | End: 2024-02-21
Payer: COMMERCIAL

## 2024-02-21 LAB
ANION GAP SERPL CALCULATED.3IONS-SCNC: 14 MMOL/L (ref 9–17)
BASOPHILS # BLD: 0.06 K/UL (ref 0–0.2)
BASOPHILS NFR BLD: 1 % (ref 0–2)
BUN SERPL-MCNC: 20 MG/DL (ref 8–23)
BUN/CREAT SERPL: 17 (ref 9–20)
CALCIUM SERPL-MCNC: 9.1 MG/DL (ref 8.6–10.4)
CHLORIDE SERPL-SCNC: 101 MMOL/L (ref 98–107)
CO2 SERPL-SCNC: 23 MMOL/L (ref 20–31)
CREAT SERPL-MCNC: 1.2 MG/DL (ref 0.5–0.9)
EOSINOPHIL # BLD: 0.2 K/UL (ref 0–0.44)
EOSINOPHILS RELATIVE PERCENT: 3 % (ref 1–4)
ERYTHROCYTE [DISTWIDTH] IN BLOOD BY AUTOMATED COUNT: 13.2 % (ref 11.8–14.4)
GFR SERPL CREATININE-BSD FRML MDRD: 48 ML/MIN/1.73M2
GLUCOSE SERPL-MCNC: 98 MG/DL (ref 70–99)
HCT VFR BLD AUTO: 40.8 % (ref 36.3–47.1)
HGB BLD-MCNC: 13.3 G/DL (ref 11.9–15.1)
IMM GRANULOCYTES # BLD AUTO: <0.03 K/UL (ref 0–0.3)
IMM GRANULOCYTES NFR BLD: 0 %
LYMPHOCYTES NFR BLD: 1.24 K/UL (ref 1.1–3.7)
LYMPHOCYTES RELATIVE PERCENT: 19 % (ref 24–43)
MCH RBC QN AUTO: 30.6 PG (ref 25.2–33.5)
MCHC RBC AUTO-ENTMCNC: 32.6 G/DL (ref 28.4–34.8)
MCV RBC AUTO: 93.8 FL (ref 82.6–102.9)
MONOCYTES NFR BLD: 0.7 K/UL (ref 0.1–1.2)
MONOCYTES NFR BLD: 11 % (ref 3–12)
NEUTROPHILS NFR BLD: 66 % (ref 36–65)
NEUTS SEG NFR BLD: 4.17 K/UL (ref 1.5–8.1)
NRBC BLD-RTO: 0 PER 100 WBC
PLATELET # BLD AUTO: 262 K/UL (ref 138–453)
PMV BLD AUTO: 10.3 FL (ref 8.1–13.5)
POTASSIUM SERPL-SCNC: 4.5 MMOL/L (ref 3.7–5.3)
RBC # BLD AUTO: 4.35 M/UL (ref 3.95–5.11)
SODIUM SERPL-SCNC: 138 MMOL/L (ref 135–144)
WBC OTHER # BLD: 6.4 K/UL (ref 3.5–11.3)

## 2024-02-21 PROCEDURE — 36415 COLL VENOUS BLD VENIPUNCTURE: CPT

## 2024-02-21 PROCEDURE — 85025 COMPLETE CBC W/AUTO DIFF WBC: CPT

## 2024-02-21 PROCEDURE — 80048 BASIC METABOLIC PNL TOTAL CA: CPT

## 2024-05-21 ENCOUNTER — HOSPITAL ENCOUNTER (OUTPATIENT)
Age: 74
Discharge: HOME | End: 2024-05-21
Payer: COMMERCIAL

## 2024-05-21 LAB
BASOPHILS # BLD: 0.07 K/UL (ref 0–0.2)
BASOPHILS NFR BLD: 1 % (ref 0–2)
EOSINOPHIL # BLD: 0.31 K/UL (ref 0–0.44)
EOSINOPHILS RELATIVE PERCENT: 5 % (ref 1–4)
ERYTHROCYTE [DISTWIDTH] IN BLOOD BY AUTOMATED COUNT: 12.8 % (ref 11.8–14.4)
EST. AVERAGE GLUCOSE BLD GHB EST-MCNC: 123 MG/DL
HBA1C MFR BLD: 5.9 % (ref 4–6)
HCT VFR BLD AUTO: 38.9 % (ref 36.3–47.1)
HGB BLD-MCNC: 12.7 G/DL (ref 11.9–15.1)
IMM GRANULOCYTES # BLD AUTO: 0.04 K/UL (ref 0–0.3)
IMM GRANULOCYTES NFR BLD: 1 %
LYMPHOCYTES NFR BLD: 1.14 K/UL (ref 1.1–3.7)
LYMPHOCYTES RELATIVE PERCENT: 18 % (ref 24–43)
MCH RBC QN AUTO: 31.1 PG (ref 25.2–33.5)
MCHC RBC AUTO-ENTMCNC: 32.6 G/DL (ref 28.4–34.8)
MCV RBC AUTO: 95.1 FL (ref 82.6–102.9)
MONOCYTES NFR BLD: 0.64 K/UL (ref 0.1–1.2)
MONOCYTES NFR BLD: 10 % (ref 3–12)
NEUTROPHILS NFR BLD: 65 % (ref 36–65)
NEUTS SEG NFR BLD: 4.25 K/UL (ref 1.5–8.1)
NRBC BLD-RTO: 0 PER 100 WBC
PLATELET # BLD AUTO: 227 K/UL (ref 138–453)
PMV BLD AUTO: 11.1 FL (ref 8.1–13.5)
RBC # BLD AUTO: 4.09 M/UL (ref 3.95–5.11)
WBC OTHER # BLD: 6.5 K/UL (ref 3.5–11.3)

## 2024-05-21 PROCEDURE — 36415 COLL VENOUS BLD VENIPUNCTURE: CPT

## 2024-05-21 PROCEDURE — 83036 HEMOGLOBIN GLYCOSYLATED A1C: CPT

## 2024-05-21 PROCEDURE — 85025 COMPLETE CBC W/AUTO DIFF WBC: CPT

## 2024-06-17 DIAGNOSIS — Z98.890 S/P HEART VALVE REPAIR: Primary | ICD-10-CM

## 2024-06-25 ENCOUNTER — HOSPITAL ENCOUNTER (OUTPATIENT)
Dept: CARDIAC REHAB | Age: 74
Setting detail: THERAPIES SERIES
Discharge: HOME | End: 2024-06-25

## 2024-06-25 RX ORDER — SENNOSIDES A AND B 8.6 MG/1
1 TABLET, FILM COATED ORAL DAILY
COMMUNITY

## 2024-06-25 RX ORDER — OXYCODONE HYDROCHLORIDE 5 MG/1
5 TABLET ORAL EVERY 4 HOURS PRN
COMMUNITY

## 2024-06-25 RX ORDER — ASPIRIN 325 MG
325 TABLET ORAL DAILY
COMMUNITY

## 2024-06-25 NOTE — PROGRESS NOTES
as tolerated, as evidenced by daily session reports and pre/post MET levels.     Introduce and progress 8-10 different bilateral UE and/or LE progressive resistance exercises focused on major muscle groups using 1-3 sets each per lift, on 2-3 non-consecutive days implementing free and machine weights and/or bands, as appropriate, with a resistance of 40-60% 1-repetition maximum or 10 -15 repetitions to progressive overload and increasing resistance once repetitions have progressed to 15 reps and feel “fairly light” on 2 prior occasions.     Develop regular home aerobic exercise program for 20 - 60 minutes at least 2 non-rehab days per week, excluding  5 - 10 minutes warm-up and cool-down periods, as tracked on home workout log.    Establish and maintain individualized heart healthy eating plan and gradually achieve/maintain a healthy body weight over the program utilizing dietician recommendations, as well as moderating nutritional intake, and performing regular aerobic and strength training exercises as prescribed, as evidenced by routine weights, pre- and post-program nutrition survey and routine rounding with patient, food diary, and diet screening survey.    Strive for blood lipid optimization with an LDL-C of <100 mg/dL or LDL 70 mg/dL, an HDL-C of > or = 40 mg/dL for men and > or = 50 mg/dL for women, and a triglyceride level of <150 mg/dL via lifestyle education, behavioral modification, and medication compliance, as evidenced by improved post-program lipid results.     Strive for HbA1c <= 7.0% via lifestyle education, behavioral modification, and medication compliance as evidenced by post program HbA1C lab results.     Achieve and maintain an optimal average resting blood pressure of <130 / 80 mmHg over the course of the program by educating patient, monitoring medication compliance, introducing and maintaining regular cardiovascular exercise program, as evidenced by routine BP monitoring.    Minimize

## 2024-07-10 ENCOUNTER — HOSPITAL ENCOUNTER (OUTPATIENT)
Dept: CARDIAC REHAB | Age: 74
Setting detail: THERAPIES SERIES
Discharge: HOME | End: 2024-07-10
Payer: COMMERCIAL

## 2024-07-10 PROCEDURE — 93798 PHYS/QHP OP CAR RHAB W/ECG: CPT

## 2024-07-11 ENCOUNTER — HOSPITAL ENCOUNTER (OUTPATIENT)
Dept: CARDIAC REHAB | Age: 74
Setting detail: THERAPIES SERIES
Discharge: HOME | End: 2024-07-11
Payer: COMMERCIAL

## 2024-07-11 PROCEDURE — 93798 PHYS/QHP OP CAR RHAB W/ECG: CPT

## 2024-07-11 NOTE — PROGRESS NOTES
Cardiopulmonary Rehab   Medical Nutrition Therapy Food Diary Evaluation    Patient Name: Luna Jimenes  Registered Dietitian:  Vidya Love, Dietetic Intern; Dahiana Ratliff RDN, YON  Date:  7/11/2024    Dear Luna,    Thank you for sharing your information about your eating patterns, it serves not only to help me see what you are eating, but you as well.  Eating 3 meals a day is great way to start, I am pleased to see that you are doing that.  Whole grains, fruits and vegetables, along with lean meats and low fat dairy are the cornerstones of your health. It is great to see that you are willing to make some changes to support your health. With that in mind, look below for some suggestions.    Your REAPS (Rapid Eating Assessment for Participants- short version) Score was: 30, which means: there are some ways you can make your eating habits healthier.     One habit you can work on improving is including a source of lean protein at each meal. Peanut butter on toast with a glass of low-fat milk is a great way to include protein in your breakfast. Try adding some protein to fruit at lunch, such low-fat cottage cheese or a turkey sandwich. At dinner, try choosing lean hamburger for sloppy joes. Other great options include chicken, fish, pork chops or tenderloin, and turkey.     Another habit you can work on is including more whole grains in your diet. Try choosing whole grain bread for toast and sandwiches, and whole grain pasta for mac & cheese and other pasta dishes. Other great options include oatmeal, brown rice, and baked potatoes.     You are doing a great job including plenty of dairy in your diet. Milk products are rich in calcium and vitamin D. These nutrients keep our bones strong and healthy as we age. Drinking low-fat or fat-free milk at meals is a great habit to continue. Keep up the good work!     Recommendations from the Dietitian based on your REAPS and Food Diary / activity record to improve health

## 2024-07-15 ENCOUNTER — APPOINTMENT (OUTPATIENT)
Dept: CARDIAC REHAB | Age: 74
End: 2024-07-15
Payer: COMMERCIAL

## 2024-07-17 ENCOUNTER — APPOINTMENT (OUTPATIENT)
Dept: CARDIAC REHAB | Age: 74
End: 2024-07-17
Payer: COMMERCIAL

## 2024-07-18 ENCOUNTER — APPOINTMENT (OUTPATIENT)
Dept: CARDIAC REHAB | Age: 74
End: 2024-07-18
Payer: COMMERCIAL

## 2024-07-22 ENCOUNTER — APPOINTMENT (OUTPATIENT)
Dept: CARDIAC REHAB | Age: 74
End: 2024-07-22
Payer: COMMERCIAL

## 2024-07-22 ENCOUNTER — HOSPITAL ENCOUNTER (OUTPATIENT)
Dept: CARDIAC REHAB | Age: 74
Setting detail: THERAPIES SERIES
Discharge: HOME | End: 2024-07-22
Payer: COMMERCIAL

## 2024-07-24 ENCOUNTER — APPOINTMENT (OUTPATIENT)
Dept: CARDIAC REHAB | Age: 74
End: 2024-07-24
Payer: COMMERCIAL

## 2024-07-25 ENCOUNTER — APPOINTMENT (OUTPATIENT)
Dept: CARDIAC REHAB | Age: 74
End: 2024-07-25
Payer: COMMERCIAL

## 2024-07-29 ENCOUNTER — APPOINTMENT (OUTPATIENT)
Dept: CARDIAC REHAB | Age: 74
End: 2024-07-29
Payer: COMMERCIAL

## 2024-07-31 ENCOUNTER — APPOINTMENT (OUTPATIENT)
Dept: CARDIAC REHAB | Age: 74
End: 2024-07-31
Payer: COMMERCIAL

## 2024-08-13 ENCOUNTER — TELEPHONE (OUTPATIENT)
Dept: CARDIAC REHAB | Age: 74
End: 2024-08-13

## 2024-08-16 ENCOUNTER — TELEPHONE (OUTPATIENT)
Dept: CARDIAC REHAB | Age: 74
End: 2024-08-16

## 2024-08-16 NOTE — TELEPHONE ENCOUNTER
Phoned Luna about returning to rehab and she stated she is too busy to come to rehab. Information will be filed.

## 2024-09-02 ENCOUNTER — HOSPITAL ENCOUNTER (EMERGENCY)
Age: 74
Discharge: HOME | End: 2024-09-02
Payer: COMMERCIAL

## 2024-09-02 VITALS
TEMPERATURE: 97.6 F | HEART RATE: 76 BPM | SYSTOLIC BLOOD PRESSURE: 135 MMHG | OXYGEN SATURATION: 96 % | DIASTOLIC BLOOD PRESSURE: 86 MMHG | RESPIRATION RATE: 20 BRPM

## 2024-09-02 DIAGNOSIS — M62.838 SPASM OF MUSCLE: Primary | ICD-10-CM

## 2024-09-02 PROCEDURE — 99284 EMERGENCY DEPT VISIT MOD MDM: CPT

## 2024-09-02 PROCEDURE — 6370000000 HC RX 637 (ALT 250 FOR IP)

## 2024-09-02 PROCEDURE — 6360000002 HC RX W HCPCS

## 2024-09-02 PROCEDURE — 96372 THER/PROPH/DIAG INJ SC/IM: CPT

## 2024-09-02 RX ORDER — CYCLOBENZAPRINE HCL 5 MG
5 TABLET ORAL 2 TIMES DAILY PRN
Qty: 10 TABLET | Refills: 0 | Status: SHIPPED | OUTPATIENT
Start: 2024-09-02 | End: 2024-09-12

## 2024-09-02 RX ORDER — CYCLOBENZAPRINE HCL 10 MG
10 TABLET ORAL ONCE
Status: COMPLETED | OUTPATIENT
Start: 2024-09-02 | End: 2024-09-02

## 2024-09-02 RX ORDER — KETOROLAC TROMETHAMINE 30 MG/ML
30 INJECTION, SOLUTION INTRAMUSCULAR; INTRAVENOUS ONCE
Status: COMPLETED | OUTPATIENT
Start: 2024-09-02 | End: 2024-09-02

## 2024-09-02 RX ADMIN — CYCLOBENZAPRINE 10 MG: 10 TABLET, FILM COATED ORAL at 10:12

## 2024-09-02 RX ADMIN — KETOROLAC TROMETHAMINE 30 MG: 30 INJECTION, SOLUTION INTRAMUSCULAR at 10:12

## 2024-09-02 ASSESSMENT — PAIN DESCRIPTION - LOCATION: LOCATION: BACK

## 2024-09-02 ASSESSMENT — PAIN DESCRIPTION - DESCRIPTORS: DESCRIPTORS: ACHING

## 2024-09-02 ASSESSMENT — PAIN - FUNCTIONAL ASSESSMENT: PAIN_FUNCTIONAL_ASSESSMENT: 0-10

## 2024-09-02 ASSESSMENT — PAIN SCALES - GENERAL: PAINLEVEL_OUTOF10: 8

## 2024-09-02 ASSESSMENT — PAIN DESCRIPTION - FREQUENCY: FREQUENCY: CONTINUOUS

## 2024-09-02 ASSESSMENT — PAIN DESCRIPTION - ORIENTATION: ORIENTATION: MID

## 2024-09-02 ASSESSMENT — PAIN DESCRIPTION - PAIN TYPE: TYPE: ACUTE PAIN

## 2024-09-02 NOTE — DISCHARGE INSTRUCTIONS
Call today or tomorrow to follow up with Jennie Wolff APRN - CNP  in 2-3 days.    Use an ice pack or bag filled with ice and apply to the injured area 3 - 4 times a day for 15 - 20 minutes each time.  If the injury is older than 3 days, then use a heating pad to help relax the muscles in your back.    Use ibuprofen or Tylenol (unless prescribed medications that have Tylenol in it) for pain.  You can take over the counter Ibuprofen (advil) tablets (4 tablets every 8 hours or 3 tablets every 6 hours or 2 tablets every 4 hours)    Return to the Emergency Department for inability to move legs, worsening of pain, tingling / loss of sensation, urinary or bowel incontinence/retention, any other care or concern.

## 2025-01-26 ENCOUNTER — HOSPITAL ENCOUNTER (EMERGENCY)
Age: 75
Discharge: HOME | End: 2025-01-26
Attending: EMERGENCY MEDICINE
Payer: COMMERCIAL

## 2025-01-26 VITALS
SYSTOLIC BLOOD PRESSURE: 139 MMHG | BODY MASS INDEX: 28.19 KG/M2 | TEMPERATURE: 98 F | DIASTOLIC BLOOD PRESSURE: 92 MMHG | WEIGHT: 180 LBS | HEART RATE: 100 BPM | OXYGEN SATURATION: 97 % | RESPIRATION RATE: 16 BRPM

## 2025-01-26 DIAGNOSIS — R21 RASH: Primary | ICD-10-CM

## 2025-01-26 PROCEDURE — 99283 EMERGENCY DEPT VISIT LOW MDM: CPT

## 2025-01-26 RX ORDER — PREDNISONE 50 MG/1
50 TABLET ORAL DAILY
Qty: 5 TABLET | Refills: 0 | Status: SHIPPED | OUTPATIENT
Start: 2025-01-26 | End: 2025-01-31

## 2025-01-26 ASSESSMENT — LIFESTYLE VARIABLES
HOW MANY STANDARD DRINKS CONTAINING ALCOHOL DO YOU HAVE ON A TYPICAL DAY: PATIENT DOES NOT DRINK
HOW OFTEN DO YOU HAVE A DRINK CONTAINING ALCOHOL: NEVER

## 2025-01-26 ASSESSMENT — PAIN - FUNCTIONAL ASSESSMENT: PAIN_FUNCTIONAL_ASSESSMENT: NONE - DENIES PAIN

## 2025-01-26 NOTE — DISCHARGE INSTRUCTIONS
Please follow-up with your dermatologist within the next 1 week.  Your rash is likely related to the psoriasis or some other autoimmune condition.  Take the prednisone as prescribed with food.

## 2025-01-26 NOTE — ED PROVIDER NOTES
[facility] EMERGENCY DEPARTMENT  EMERGENCY DEPARTMENT ENCOUNTER      Pt Name: Luna Jimenes  MRN: [id number]  Birthdate 1950  Date of evaluation: 1/26/2025  Provider: Selene Alcala DO    CHIEF COMPLAINT       Chief Complaint   Patient presents with    Eye Problem     Redness surrounding bilateral eyes with irritation x2 weeks. Saw her pcp and given steroids and antibiotics. States the swelling decreased a small amount. C/o burning feeling and itching. Denies visual changes.          HISTORY OF PRESENT ILLNESS   (Location/Symptom, Timing/Onset, Context/Setting, Quality, Duration, Modifying Factors, Severity)  Note limiting factors.   Luna Jimenes is a 74 y.o. female who presents to the emergency department with rash/redness around both her eyes for the past 2 weeks.  Patient states that she saw her PCP about 2 weeks ago and they started her on antibiotics and steroids.  She states that the left eye was initially a little puffy and that improved however the rash/redness has not gone away.  Patient states that her doctor advised her to come to the ER if it does not get better.  Patient denies any abnormal drainage from her eyes.  She denies any vision changes or eye pain.  She does have a history of psoriasis and sees a dermatologist on a regular basis.  Patient denies any new make-up products or creams or lotions that she has used.  She denies any other concerns.  She feels like the area around her eyes has just been very irritated.    REVIEW OF SYSTEMS    (2-9 systems for level 4, 10 or more for level 5)     Review of Systems   Constitutional:  Negative for fatigue and fever.   HENT:  Negative for congestion and sore throat.         Redness around both eyes.   Eyes:  Negative for visual disturbance.   Respiratory:  Negative for shortness of breath.    Cardiovascular:  Negative for chest pain and palpitations.   Gastrointestinal:  Negative for abdominal distention.   Genitourinary:  Negative for

## 2025-05-27 ENCOUNTER — HOSPITAL ENCOUNTER (OUTPATIENT)
Dept: MAMMOGRAPHY | Age: 75
Discharge: HOME | End: 2025-05-29
Payer: COMMERCIAL

## 2025-05-27 DIAGNOSIS — Z12.31 ENCOUNTER FOR SCREENING MAMMOGRAM FOR MALIGNANT NEOPLASM OF BREAST: ICD-10-CM

## 2025-05-27 PROCEDURE — 77063 BREAST TOMOSYNTHESIS BI: CPT

## 2025-07-17 ENCOUNTER — TRANSCRIBE ORDERS (OUTPATIENT)
Dept: ADMINISTRATIVE | Age: 75
End: 2025-07-17

## 2025-07-17 DIAGNOSIS — E03.9 HYPOTHYROIDISM, UNSPECIFIED TYPE: Primary | ICD-10-CM

## 2025-07-24 ENCOUNTER — HOSPITAL ENCOUNTER (OUTPATIENT)
Dept: ULTRASOUND IMAGING | Age: 75
Discharge: HOME | End: 2025-07-26
Payer: COMMERCIAL

## 2025-07-24 DIAGNOSIS — E03.9 HYPOTHYROIDISM, UNSPECIFIED TYPE: ICD-10-CM

## 2025-07-24 PROCEDURE — 76536 US EXAM OF HEAD AND NECK: CPT

## (undated) DEVICE — SUTURE VIC + ABS BR UD X1 2-0 27IN VCP459H

## (undated) DEVICE — ORTHO EXT PK

## (undated) DEVICE — STERILE HOOK LOCK LATEX FREE ELASTIC BANDAGE 4INX5YD: Brand: HOOK LOCK™

## (undated) DEVICE — STERILE HOOK LOCK LATEX FREE ELASTIC BANDAGE 6INX5YD: Brand: HOOK LOCK™

## (undated) DEVICE — BANDAGE,GAUZE,BULKEE II,4.5"X4.1YD,STRL: Brand: MEDLINE

## (undated) DEVICE — SPLINT CAST W5XL30IN GRN STRENGTH PLSTR OF PARIS FAST SET

## (undated) DEVICE — C-ARMOR C-ARM EQUIPMENT COVERS CLEAR STERILE UNIVERSAL FIT 12 PER CASE: Brand: C-ARMOR

## (undated) DEVICE — DRILL,  2.4 X 140MM, SOLID, MEASURING, LONG, AO: Brand: BABY GORILLA®/GORILLA® PLATING SYSTEM

## (undated) DEVICE — OLIVE WIRE, SMOOTH, 1.4MM: Brand: BABY GORILLA/GORILLA PLATING SYSTEM

## (undated) DEVICE — SPLINT THMB W5XL30IN FBRGLS PD PRECUT LTWT DURABLE FAST SET

## (undated) DEVICE — GARMENT COMPR STD FOR 17IN CALF UNIF THER FLOTRN

## (undated) DEVICE — GLOVE ORANGE PI 7   MSG9070

## (undated) DEVICE — 3M™ COBAN™ NL STERILE NON-LATEX SELF-ADHERENT WRAP, 2084S, 4 IN X 5 YD (10 CM X 4,5 M), 18 ROLLS/CASE: Brand: 3M™ COBAN™

## (undated) DEVICE — ESMARK: Brand: DEROYAL

## (undated) DEVICE — PADDING CAST W6INXL4YD ST COT COHESIVE HND TEARABLE SPEC

## (undated) DEVICE — SYRINGE BLB 2 PC STER 50

## (undated) DEVICE — DRAPE C ARM UNIV W41XL74IN CLR PLAS XR VELC CLSR POLY STRP

## (undated) DEVICE — SUTURE PDS II SZ 4-0 L18IN ABSRB UD L19MM PS-2 3/8 CIR PRIM Z496G

## (undated) DEVICE — PADDING CAST W4INXL4YD ST COT COHESIVE HND TEARABLE SPEC

## (undated) DEVICE — ZIMMER® STERILE DISPOSABLE TOURNIQUET CUFF, DUAL PORT, SINGLE BLADDER, 34 IN. (86 CM)

## (undated) DEVICE — SKIN SCRUB TRAY: Brand: MEDLINE INDUSTRIES, INC.

## (undated) DEVICE — DRILL, 2.6 X 130MM, CANNULATED, AO: Brand: MONSTER SCREW SYSTEM

## (undated) DEVICE — ZIMMER® STERILE DISPOSABLE TOURNIQUET CUFF WITH PROTECTIVE SLEEVE AND PLC, DUAL PORT, SINGLE BLADDER, 24 IN. (61 CM)

## (undated) DEVICE — SUTURE PDS II SZ 2-0 L27IN ABSRB VLT SH L26MM 1/2 CIR Z317H

## (undated) DEVICE — CHLORAPREP 26ML ORANGE